# Patient Record
Sex: FEMALE | NOT HISPANIC OR LATINO | Employment: FULL TIME | URBAN - METROPOLITAN AREA
[De-identification: names, ages, dates, MRNs, and addresses within clinical notes are randomized per-mention and may not be internally consistent; named-entity substitution may affect disease eponyms.]

---

## 2017-04-25 ENCOUNTER — OFFICE VISIT (OUTPATIENT)
Dept: DERMATOLOGY | Facility: CLINIC | Age: 32
End: 2017-04-25
Payer: COMMERCIAL

## 2017-04-25 VITALS — DIASTOLIC BLOOD PRESSURE: 83 MMHG | OXYGEN SATURATION: 95 % | SYSTOLIC BLOOD PRESSURE: 115 MMHG | HEART RATE: 80 BPM

## 2017-04-25 DIAGNOSIS — D23.9 DERMATOFIBROMA: ICD-10-CM

## 2017-04-25 DIAGNOSIS — D22.9 NEVUS: Primary | ICD-10-CM

## 2017-04-25 PROCEDURE — 99212 OFFICE O/P EST SF 10 MIN: CPT | Performed by: PHYSICIAN ASSISTANT

## 2017-04-25 NOTE — PROGRESS NOTES
HPI:   Faith Hernández is a 31 year old female who presents for recheck of mole on lower abdomen. She has not noticed it growing or changing.   chief complaint  Location: lower abd   Condition present for:  years.   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    PHYSICAL EXAM:      Skin exam performed as follows: Type 3 skin. Mood appropriate  Alert and Oriented X 3. Well developed, well nourished in no distress.  General appearance: Normal  Head including face: Normal  Eyes: conjunctiva and lids: Normal  Mouth: Lips, teeth, gums: Normal  Neck: Normal  Chest-breast/axillae: Normal  Back: Normal  Spleen and liver: Normal  Cardiovascular: Exam of peripheral vascular system by observation for swelling, varicosities, edema: Normal  Genitalia: groin, buttocks: Normal  Extremities: digits/nails (clubbing): Normal  Eccrine and Apocrine glands: Normal  Right upper extremity: Normal  Left upper extremity: Normal  Right lower extremity: Normal  Left lower extremity: Normal  Skin: Scalp and body hair: See below    1. 2 x 2.5 mm dark brown macule on mid lower abdomen  2. Firm subcutaneous nodule with dimple sign on left dorsal wrist    ASSESSMENT/PLAN:     1. Benign appearing nevus on mid lower abdomen - advised. No change from previous measurement; will continue to monitor yearly. Knows to return immediately if growing or changing in any way.   2. Dermatofibroma on left dorsal wrist - benign no treatment needed.          Follow-up: yearly FSE/PRN sooner  CC:   Scribed By: Kareen Harrison, MS, PA-C

## 2017-04-25 NOTE — NURSING NOTE
"Initial /83  Pulse 80  SpO2 95% Estimated body mass index is 26.54 kg/(m^2) as calculated from the following:    Height as of 10/21/16: 1.607 m (5' 3.25\").    Weight as of 10/21/16: 68.5 kg (151 lb). .      "

## 2017-04-25 NOTE — MR AVS SNAPSHOT
After Visit Summary   4/25/2017    Faith Hernández    MRN: [id number]           Patient Information     Date Of Birth          1985        Visit Information        Provider Department      4/25/2017 9:00 AM Kareen Harrison PA-C [facility]        Today's Diagnoses     Nevus    -  1    Dermatofibroma           Follow-ups after your visit        Who to contact     If you have questions or need follow up information about today's clinic visit or your schedule please contact [facility] directly at [phone].  Normal or non-critical lab and imaging results will be communicated to you by [employer], letter or phone within 4 business days after the clinic has received the results. If you do not hear from us within 7 days, please contact the clinic through [employer] or phone. If you have a critical or abnormal lab result, we will notify you by phone as soon as possible.  Submit refill requests through [employer] or call your pharmacy and they will forward the refill request to us. Please allow 3 business days for your refill to be completed.          Additional Information About Your Visit        MyChart Information     Mem[employer]aze gives you secure access to your electronic health record. If you see a primary care provider, you can also send messages to your care team and make appointments. If you have questions, please call your primary care clinic.  If you do not have a primary care provider, please call [phone] and they will assist you.        Care EveryWhere ID     This is your Care EveryWhere ID. This could be used by other organizations to access your [place] medical records  [id number]        Your Vitals Were     Pulse Pulse Oximetry                80 95%           Blood Pressure from Last 3 Encounters:   04/25/17 115/83   10/25/16 116/80   10/21/16 104/66    Weight from Last 3 Encounters:   10/21/16 68.5 kg (151 lb)   07/07/15 66.7 kg (147 lb)    08/29/14 63.5 kg (140 lb)              Today, you had the following     No orders found for display       Primary Care Provider Office Phone # Fax #    Michael Arenas [phone]       [facility] [address] [facility] [address]        Thank you!     Thank you for choosing [facility]  for your care. Our goal is always to provide you with excellent care. Hearing back from our patients is one way we can continue to improve our services. Please take a few minutes to complete the written survey that you may receive in the mail after your visit with us. Thank you!             Your Updated Medication List - Protect others around you: Learn how to safely use, store and throw away your medicines at www.disposemymeds.org.          This list is accurate as of: 4/25/17  1:52 PM.  Always use your most recent med list.                   Brand Name Dispense Instructions for use    levonorgestrel-ethinyl estradiol 0.1-20 MG-MCG per tablet    AVIANE,ALESSE,LESSINA    84 tablet    Take 1 tablet by mouth daily       levothyroxine 112 MCG tablet    SYNTHROID/LEVOTHROID    90 tablet    Take 1 tablet (112 mcg) by mouth daily

## 2017-10-24 DIAGNOSIS — Z30.9 ENCOUNTER FOR CONTRACEPTIVE MANAGEMENT: ICD-10-CM

## 2017-10-25 RX ORDER — LEVONORGESTREL/ETHIN.ESTRADIOL 0.1-0.02MG
TABLET ORAL
Qty: 28 TABLET | Refills: 0 | Status: SHIPPED | OUTPATIENT
Start: 2017-10-25 | End: 2017-12-02

## 2017-11-22 DIAGNOSIS — E03.9 HYPOTHYROIDISM, UNSPECIFIED TYPE: ICD-10-CM

## 2017-11-22 RX ORDER — LEVOTHYROXINE SODIUM 112 UG/1
TABLET ORAL
Qty: 30 TABLET | Refills: 0 | Status: SHIPPED | OUTPATIENT
Start: 2017-11-22 | End: 2017-12-05

## 2017-11-22 NOTE — TELEPHONE ENCOUNTER
Levothyroxine dose was increased one year ago to 112  g daily. Patient never had follow-up lab done as instructed. Has appointment to see me in early December. Wll refill medication for 30 days and have lab drawn prior to that if patient reads my chart message or when she sees me in clinic.

## 2017-12-02 ENCOUNTER — MYC REFILL (OUTPATIENT)
Dept: INTERNAL MEDICINE | Facility: CLINIC | Age: 32
End: 2017-12-02

## 2017-12-02 DIAGNOSIS — Z30.9 ENCOUNTER FOR CONTRACEPTIVE MANAGEMENT: ICD-10-CM

## 2017-12-02 DIAGNOSIS — E03.9 HYPOTHYROIDISM, UNSPECIFIED TYPE: ICD-10-CM

## 2017-12-02 LAB — TSH SERPL DL<=0.005 MIU/L-ACNC: 2.26 MU/L (ref 0.4–4)

## 2017-12-02 PROCEDURE — 84443 ASSAY THYROID STIM HORMONE: CPT | Performed by: INTERNAL MEDICINE

## 2017-12-02 PROCEDURE — 36415 COLL VENOUS BLD VENIPUNCTURE: CPT | Performed by: INTERNAL MEDICINE

## 2017-12-04 RX ORDER — LEVONORGESTREL/ETHIN.ESTRADIOL 0.1-0.02MG
1 TABLET ORAL DAILY
Qty: 28 TABLET | Refills: 0 | Status: SHIPPED | OUTPATIENT
Start: 2017-12-04 | End: 2017-12-05

## 2017-12-04 NOTE — TELEPHONE ENCOUNTER
Message from [employer]:  Original authorizing provider: MD Faith Melendez would like a refill of the following medications:  levonorgestrel-ethinyl estradiol (VIRIDIANA RICHEY LESSINA) 0.1-20 MG-MCG per tablet [Michael Arenas MD]    Preferred pharmacy: [id number] - [place], [place] - [address]    Comment:  I need a refill for tomorrow as I have run out. I have an appointment with Dr. Arenas on Tuesday.

## 2017-12-05 ENCOUNTER — OFFICE VISIT (OUTPATIENT)
Dept: INTERNAL MEDICINE | Facility: CLINIC | Age: 32
End: 2017-12-05
Payer: COMMERCIAL

## 2017-12-05 VITALS
DIASTOLIC BLOOD PRESSURE: 76 MMHG | WEIGHT: 158 LBS | OXYGEN SATURATION: 99 % | SYSTOLIC BLOOD PRESSURE: 112 MMHG | BODY MASS INDEX: 27.77 KG/M2 | HEART RATE: 98 BPM | TEMPERATURE: 97.9 F

## 2017-12-05 DIAGNOSIS — Z00.00 ENCOUNTER FOR ROUTINE ADULT HEALTH EXAMINATION WITHOUT ABNORMAL FINDINGS: ICD-10-CM

## 2017-12-05 DIAGNOSIS — Z30.9 ENCOUNTER FOR CONTRACEPTIVE MANAGEMENT, UNSPECIFIED TYPE: Primary | ICD-10-CM

## 2017-12-05 DIAGNOSIS — E03.9 HYPOTHYROIDISM, UNSPECIFIED TYPE: ICD-10-CM

## 2017-12-05 PROCEDURE — 99395 PREV VISIT EST AGE 18-39: CPT | Performed by: INTERNAL MEDICINE

## 2017-12-05 RX ORDER — LEVONORGESTREL/ETHIN.ESTRADIOL 0.1-0.02MG
1 TABLET ORAL DAILY
Qty: 84 TABLET | Refills: 3 | Status: SHIPPED | OUTPATIENT
Start: 2017-12-05 | End: 2018-11-27

## 2017-12-05 RX ORDER — LEVOTHYROXINE SODIUM 112 UG/1
112 TABLET ORAL DAILY
Qty: 90 TABLET | Refills: 3 | Status: SHIPPED | OUTPATIENT
Start: 2017-12-05 | End: 2018-12-17

## 2017-12-05 NOTE — PROGRESS NOTES
SUBJECTIVE:   CC: Faith Hernández is an 32 year old woman who presents for preventive health visit.     Healthy Habits:  Answers for HPI/ROS submitted by the patient on 12/5/2017   Annual Exam:  Getting at least 3 servings of Calcium per day:: Yes  Bi-annual eye exam:: Yes  Dental care twice a year:: Yes  Sleep apnea or symptoms of sleep apnea:: None  Diet:: Regular (no restrictions)  Frequency of exercise:: 1 day/week  Taking medications regularly:: Yes  Medication side effects:: Not applicable  Additional concerns today:: No  PHQ-2 Score: 0  Duration of exercise:: 15-30 minutes          Today's PHQ-2 Score: PHQ-2 ( 1999 Pfizer) 10/21/2016 7/7/2015   Q1: Little interest or pleasure in doing things 0 0   Q2: Feeling down, depressed or hopeless 0 0   PHQ-2 Score 0 0   Q1: Little interest or pleasure in doing things Not at all -   Q2: Feeling down, depressed or hopeless Not at all -   PHQ-2 Score 0 -         Abuse: Current or Past(Physical, Sexual or Emotional)- No  Do you feel safe in your environment - Yes  Social History   Substance Use Topics     Smoking status: Never Smoker     Smokeless tobacco: Not on file     Alcohol use Yes      Comment: 6-8 weekly     The patient does not drink >3 drinks per day nor >7 drinks per week.    Reviewed orders with patient.  Reviewed health maintenance and updated orders accordingly - Yes  Labs reviewed in EPIC    Mammogram not appropriate for this patient based on age.    Pertinent mammograms are reviewed under the imaging tab.  History of abnormal Pap smear: NO - age 30-65 PAP every 5 years with negative HPV co-testing recommended    Reviewed and updated as needed this visit by clinical staff         Reviewed and updated as needed this visit by Provider            ROS:  C: NEGATIVE for fever, chills. Weight up 6 pounds  I: NEGATIVE for worrisome rashes, moles or lesions.   E: NEGATIVE for vision changes or irritation. Eye exam 2 years ago  ENT: NEGATIVE for ear, mouth and  throat problems  R: NEGATIVE for significant cough or SOB  B: NEGATIVE for masses, tenderness or discharge  CV: NEGATIVE for chest pain, palpitations or peripheral edema  GI: NEGATIVE for nausea, abdominal pain, heartburn, or change in bowel habits  : NEGATIVE for unusual urinary or vaginal symptoms. Periods are regular. LMP 1 week ago. Pap UTD  M: NEGATIVE for significant arthralgias or myalgia  N: NEGATIVE for weakness, dizziness or paresthesias  E: On thyroid replacement. Hx Vit D deficiency but not taking supplement recently  P: NEGATIVE for changes in mood or affect    OBJECTIVE:   /76  Pulse 98  Temp 97.9  F (36.6  C) (Oral)  Wt 158 lb (71.7 kg)  LMP 11/29/2017 (Exact Date)  SpO2 99%  BMI 27.77 kg/m2  EXAM:  General appearance - healthy, alert, no distress  Skin - No rashes or lesions.  Head - normocephalic, atraumatic  Eyes - PEACE, EOMI, fundi exam with nondilated pupils negative.  Ears - External ears normal. Canals clear. TM's normal.  Nose/Sinuses - Nares normal. Septum midline. Mucosa normal. No drainage or sinus tenderness.  Oropharynx - No erythema, no adenopathy, no exudates.  Neck - Supple without adenopathy or thyromegaly. No bruits.  Lungs - Clear to auscultation without wheezes/rhonchi.  Heart - Regular rate and rhythm without murmurs, clicks, or gallops.  Nodes - No supraclavicular, axillary, or inguinal adenopathy palpable.  Breasts - deferred  Abdomen - Abdomen soft, non-tender. BS normal. No masses or hepatosplenomegaly palpable. No bruits.  Extremities -No cyanosis, clubbing or edema.    Musculoskeletal - Spine ROM normal. Muscular strength intact.   Peripheral pulses - radial=4/4, femoral=4/4, posterior tibial=4/4, dorsalis pedis=4/4,  Neuro - Gait normal. Reflexes normal and symmetric. Sensation grossly WNL.  Genital/Rectal - deferred      ASSESSMENT/PLAN:   1. Encounter for routine adult health examination without abnormal findings  Health maintenance up-to-date.  Patient  "declines flu shot.  Screening labs in 1 year as ordered. See below re: diet counselling  - Vitamin D Deficiency; Future  - CBC with platelets; Future  - Comprehensive metabolic panel; Future  - Vitamin B12; Future  - Lipid panel reflex to direct LDL Fasting; Future    2. Encounter for contraceptive management, unspecified type  Patient wishes to continue birth control.  Medication refilled.  - levonorgestrel-ethinyl estradiol (AVIANE,ALESSE,LESSINA) 0.1-20 MG-MCG per tablet; Take 1 tablet by mouth daily  Dispense: 84 tablet; Refill: 3    3. Hypothyroidism, unspecified type  Controlled.  Continue current medication  - levothyroxine (SYNTHROID/LEVOTHROID) 112 MCG tablet; Take 1 tablet (112 mcg) by mouth daily  Dispense: 90 tablet; Refill: 3  - TSH with free T4 reflex; Future      COUNSELING:   Reviewed preventive health counseling, as reflected in patient instructions  Special attention given to:        Regular exercise       Healthy diet/nutrition         reports that she has never smoked. She does not have any smokeless tobacco history on file.    Estimated body mass index is 26.54 kg/(m^2) as calculated from the following:    Height as of 10/21/16: 5' 3.25\" (1.607 m).    Weight as of 10/21/16: 151 lb (68.5 kg).   Weight management plan: Discussed healthy diet and exercise guidelines and patient will follow up in 12 months in clinic to re-evaluate.    Counseling Resources:  ATP IV Guidelines  Pooled Cohorts Equation Calculator  Breast Cancer Risk Calculator  FRAX Risk Assessment  ICSI Preventive Guidelines  Dietary Guidelines for Americans, 2010  USDA's MyPlate  ASA Prophylaxis  Lung CA Screening      PLAN:  Continue current meds  Prescriptions refilled.    Call  [phone] or use [employer] to schedule a future lab appointment  fasting in 1 year.   Follow up with me a few days after these future labs are drawn to review results and other medical issues as necessary  Calorie/carbohydrate (sugar, bread, potato, pasta, " etc) reduction in diet for weight loss.  Increase color on your plate with fruits and vegetables. Increase  frequency of walking or other aerobic exercise as able (goal is daily)  Patient declines flu vaccination  Eye exam in the next 1 year       Michael Arenas MD  [facility]

## 2017-12-05 NOTE — NURSING NOTE
"Chief Complaint   Patient presents with     Physical       Initial /76  Pulse 98  Temp 97.9  F (36.6  C) (Oral)  Wt 158 lb (71.7 kg)  LMP 11/29/2017 (Exact Date)  SpO2 99%  BMI 27.77 kg/m2 Estimated body mass index is 27.77 kg/(m^2) as calculated from the following:    Height as of 10/21/16: 5' 3.25\" (1.607 m).    Weight as of this encounter: 158 lb (71.7 kg).  Medication Reconciliation: complete  "

## 2017-12-05 NOTE — PATIENT INSTRUCTIONS
Continue current meds  Prescriptions refilled.    Call  [phone] or use [employer] to schedule a future lab appointment  fasting in 1 year.   Follow up with me a few days after these future labs are drawn to review results and other medical issues as necessary  Calorie/carbohydrate (sugar, bread, potato, pasta, etc) reduction in diet for weight loss.  Increase color on your plate with fruits and vegetables. Increase  frequency of walking or other aerobic exercise as able (goal is daily)  Patient declines flu vaccination  Eye exam in the next 1 year

## 2017-12-05 NOTE — MR AVS SNAPSHOT
After Visit Summary   12/5/2017    Faith Hernández    MRN: [id number]           Patient Information     Date Of Birth          1985        Visit Information        Provider Department      12/5/2017 3:00 PM Michael Arenas MD [facility]        Today's Diagnoses     Encounter for contraceptive management, unspecified type    -  1    Encounter for routine adult health examination without abnormal findings        Hypothyroidism, unspecified type          Care Instructions    Continue current meds  Prescriptions refilled.    Call  [phone] or use [employer] to schedule a future lab appointment  fasting in 1 year.   Follow up with me a few days after these future labs are drawn to review results and other medical issues as necessary  Calorie/carbohydrate (sugar, bread, potato, pasta, etc) reduction in diet for weight loss.  Increase color on your plate with fruits and vegetables. Increase  frequency of walking or other aerobic exercise as able (goal is daily)  Patient declines flu vaccination  Eye exam in the next 1 year            Follow-ups after your visit        Future tests that were ordered for you today     Open Future Orders        Priority Expected Expires Ordered    Lipid panel reflex to direct LDL Fasting Routine 11/5/2018 12/5/2018 12/5/2017    Vitamin D Deficiency Routine 11/5/2018 12/5/2018 12/5/2017    CBC with platelets Routine 11/5/2018 12/5/2018 12/5/2017    Comprehensive metabolic panel Routine 11/5/2018 12/5/2018 12/5/2017    Vitamin B12 Routine 11/5/2018 12/5/2018 12/5/2017    TSH with free T4 reflex Routine 11/5/2018 12/5/2018 12/5/2017            Who to contact     If you have questions or need follow up information about today's clinic visit or your schedule please contact [facility] directly at [phone].  Normal or non-critical lab and imaging results will be communicated to you by MyChart, letter or phone within 4 business  days after the clinic has received the results. If you do not hear from us within 7 days, please contact the clinic through Hooked or phone. If you have a critical or abnormal lab result, we will notify you by phone as soon as possible.  Submit refill requests through Hooked or call your pharmacy and they will forward the refill request to us. Please allow 3 business days for your refill to be completed.          Additional Information About Your Visit        Hooked Information     Hooked gives you secure access to your electronic health record. If you see a primary care provider, you can also send messages to your care team and make appointments. If you have questions, please call your primary care clinic.  If you do not have a primary care provider, please call [phone] and they will assist you.        Care EveryWhere ID     This is your Care EveryWhere ID. This could be used by other organizations to access your [place] medical records  [id number]        Your Vitals Were     Pulse Temperature Last Period Pulse Oximetry BMI (Body Mass Index)       98 97.9  F (36.6  C) (Oral) 11/29/2017 (Exact Date) 99% 27.77 kg/m2        Blood Pressure from Last 3 Encounters:   12/05/17 112/76   04/25/17 115/83   10/25/16 116/80    Weight from Last 3 Encounters:   12/05/17 158 lb (71.7 kg)   10/21/16 151 lb (68.5 kg)   07/07/15 147 lb (66.7 kg)                 Today's Medication Changes          These changes are accurate as of: 12/5/17  3:32 PM.  If you have any questions, ask your nurse or doctor.               These medicines have changed or have updated prescriptions.        Dose/Directions    levothyroxine 112 MCG tablet   Commonly known as:  SYNTHROID/LEVOTHROID   This may have changed:  See the new instructions.   Used for:  Hypothyroidism, unspecified type   Changed by:  Michale Arenas MD        Dose:  112 mcg   Take 1 tablet (112 mcg) by mouth daily   Quantity:  90 tablet   Refills:  3            Where to get  your medicines      These medications were sent to [id number] - [place], [place] - [address]  [address], [place] [address]     Phone:  [phone]     levonorgestrel-ethinyl estradiol 0.1-20 MG-MCG per tablet    levothyroxine 112 MCG tablet                Primary Care Provider Office Phone # Fax #    Michael Arenas [phone]       [address] [facility] [address]        Equal Access to Services     MAIRA PRESTON : Hadii aad ku hadasho Soomaali, waaxda luqadaha, qaybta kaalmada adeegyada, waxay idiin hayaan adeeg thalia camargo . So [facility] [phone].    ATENCIÓN: Si habla español, tiene a xiao disposición servicios gratuitos de asistencia lingüística. [facility] [phone].    We comply with applicable federal civil rights laws and [place] laws. We do not discriminate on the basis of race, color, national origin, age, disability, sex, sexual orientation, or gender identity.            Thank you!     Thank you for choosing [facility]  for your care. Our goal is always to provide you with excellent care. Hearing back from our patients is one way we can continue to improve our services. Please take a few minutes to complete the written survey that you may receive in the mail after your visit with us. Thank you!             Your Updated Medication List - Protect others around you: Learn how to safely use, store and throw away your medicines at www.disposemymeds.org.          This list is accurate as of: 12/5/17  3:32 PM.  Always use your most recent med list.                   Brand Name Dispense Instructions for use Diagnosis    levonorgestrel-ethinyl estradiol 0.1-20 MG-MCG per tablet    AVIANE,ALESSE,LESSINA    84 tablet    Take 1 tablet by mouth daily    Encounter for contraceptive management, unspecified type       levothyroxine 112 MCG tablet    SYNTHROID/LEVOTHROID    90 tablet    Take 1 tablet (112 mcg) by mouth daily    Hypothyroidism,  unspecified type

## 2017-12-22 ENCOUNTER — MYC MEDICAL ADVICE (OUTPATIENT)
Dept: INTERNAL MEDICINE | Facility: CLINIC | Age: 32
End: 2017-12-22

## 2017-12-22 ENCOUNTER — MYC REFILL (OUTPATIENT)
Dept: INTERNAL MEDICINE | Facility: CLINIC | Age: 32
End: 2017-12-22

## 2017-12-22 DIAGNOSIS — Z30.9 ENCOUNTER FOR CONTRACEPTIVE MANAGEMENT, UNSPECIFIED TYPE: ICD-10-CM

## 2017-12-22 DIAGNOSIS — E03.9 HYPOTHYROIDISM, UNSPECIFIED TYPE: ICD-10-CM

## 2017-12-22 RX ORDER — LEVONORGESTREL/ETHIN.ESTRADIOL 0.1-0.02MG
1 TABLET ORAL DAILY
Qty: 84 TABLET | Refills: 3 | Status: CANCELLED | OUTPATIENT
Start: 2017-12-22

## 2017-12-22 RX ORDER — LEVOTHYROXINE SODIUM 112 UG/1
112 TABLET ORAL DAILY
Qty: 90 TABLET | Refills: 3 | Status: CANCELLED | OUTPATIENT
Start: 2017-12-22

## 2017-12-22 NOTE — TELEPHONE ENCOUNTER
Message from [employer]:  Original authorizing provider: MD Faith Melendez KARENNic Hernández would like a refill of the following medications:  levonorgestrel-ethinyl estradiol (AVIANE,ALENORAE,LESSINA) 0.1-20 MG-MCG per tablet [Michael Arenas MD]  levothyroxine (SYNTHROID/LEVOTHROID) 112 MCG tablet [Michael Arenas MD]    Preferred pharmacy: [id number] - [place], [place] - [address]    Comment:  Both prescriptions were approved for refill after my appointment with Dr. Arenas in December. I tried to refill my birth control however was told it is too early to refill. I need this prescription by Sunday 12/24. Also, please refill the thyroid if possible as I am running low. Thanks,

## 2018-08-12 ENCOUNTER — OFFICE VISIT (OUTPATIENT)
Dept: URGENT CARE | Facility: URGENT CARE | Age: 33
End: 2018-08-12
Payer: COMMERCIAL

## 2018-08-12 VITALS
BODY MASS INDEX: 26.54 KG/M2 | RESPIRATION RATE: 20 BRPM | HEART RATE: 84 BPM | TEMPERATURE: 97.9 F | WEIGHT: 151 LBS | DIASTOLIC BLOOD PRESSURE: 86 MMHG | SYSTOLIC BLOOD PRESSURE: 136 MMHG

## 2018-08-12 DIAGNOSIS — R21 RASH AND NONSPECIFIC SKIN ERUPTION: Primary | ICD-10-CM

## 2018-08-12 PROCEDURE — 99213 OFFICE O/P EST LOW 20 MIN: CPT | Performed by: PHYSICIAN ASSISTANT

## 2018-08-12 RX ORDER — METHYLPREDNISOLONE 4 MG
TABLET, DOSE PACK ORAL
Qty: 21 TABLET | Refills: 0 | Status: SHIPPED | OUTPATIENT
Start: 2018-08-12 | End: 2018-12-12

## 2018-08-12 RX ORDER — TRIAMCINOLONE ACETONIDE 1 MG/G
CREAM TOPICAL
Qty: 80 G | Refills: 0 | Status: SHIPPED | OUTPATIENT
Start: 2018-08-12 | End: 2018-12-12

## 2018-08-12 RX ORDER — CETIRIZINE HYDROCHLORIDE 10 MG/1
10 TABLET ORAL EVERY EVENING
Qty: 30 TABLET | Refills: 0 | Status: SHIPPED | OUTPATIENT
Start: 2018-08-12 | End: 2018-12-12

## 2018-08-12 NOTE — PROGRESS NOTES
SUBJECTIVE:  Faith Hernández is a 32 year old female who presents to the clinic today for a rash on her right upper and lower leg and her left lower leg, onset this morning.   States that she was sitting outside at a campfire last night.  Also slept on a futon on the floor at a friend's house.    Denies any fevers, joint pain or blistery rash or open wounds.    She is otherwise in her usual state of health.    She has not taken any thing for the symptoms yet.    Somewhat itchy.    Recent exposure history: as above  Recent new medications: None  Associated symptoms include: nothing.    Past Medical History:   Diagnosis Date     Hypothyroidism 1/4/2012      No Known Allergies  Social History   Substance Use Topics     Smoking status: Never Smoker     Smokeless tobacco: Never Used     Alcohol use Yes      Comment: 6-8 weekly       ROS:  CONSTITUTIONAL:NEGATIVE for fever, chills, change in weight  INTEGUMENTARY/SKIN: as per HPI  ENT/MOUTH: NEGATIVE for ear, mouth and throat problems  RESP:NEGATIVE for significant cough or SOB  CV: NEGATIVE for chest pain, palpitations or peripheral edema  GI: NEGATIVE for nausea, abdominal pain, heartburn, or change in bowel habits  MUSCULOSKELETAL: NEGATIVE for significant arthralgias or myalgia  NEURO: NEGATIVE for weakness, dizziness or paresthesias  Review of systems negative except as stated above.    EXAM:   /86 (Cuff Size: Adult Regular)  Pulse 84  Temp 97.9  F (36.6  C) (Oral)  Resp 20  Wt 151 lb (68.5 kg)  BMI 26.54 kg/m2  GENERAL: alert, no acute distress.  SKIN: erythematous raised lesions about 1cm in diameter, a few somewhat larger, on right upper and lower leg.  A few on left lower leg.    No vesicles.  No scabs or open wounds.    OP: clear  JOINTS: non tender    (R21) Rash and nonspecific skin eruption  (primary encounter diagnosis)  Comment: consistent with likely insect bites   Plan: methylPREDNISolone (MEDROL DOSEPAK) 4 MG         tablet, triamcinolone  (KENALOG) 0.1 % cream,         cetirizine (ZYRTEC) 10 MG tablet            Follow up with primary clinic should symptoms persist or worsen.     Patient expresses understanding and agreement with the assessment and plan as above.

## 2018-08-12 NOTE — PATIENT INSTRUCTIONS
(R21) Rash and nonspecific skin eruption  (primary encounter diagnosis)  Comment: consistent with likely insect bites   Plan: methylPREDNISolone (MEDROL DOSEPAK) 4 MG         tablet, triamcinolone (KENALOG) 0.1 % cream,         cetirizine (ZYRTEC) 10 MG tablet            Follow up with primary clinic should symptoms persist or worsen.

## 2018-08-12 NOTE — MR AVS SNAPSHOT
After Visit Summary   8/12/2018    Faith Hernández    MRN: [id number]           Patient Information     Date Of Birth          1985        Visit Information        Provider Department      8/12/2018 4:25 PM Erna Mckenzie PA-C [facility]        Today's Diagnoses     Rash and nonspecific skin eruption    -  1      Care Instructions    (R21) Rash and nonspecific skin eruption  (primary encounter diagnosis)  Comment: consistent with likely insect bites   Plan: methylPREDNISolone (MEDROL DOSEPAK) 4 MG         tablet, triamcinolone (KENALOG) 0.1 % cream,         cetirizine (ZYRTEC) 10 MG tablet            Follow up with primary clinic should symptoms persist or worsen.              Follow-ups after your visit        Who to contact     If you have questions or need follow up information about today's clinic visit or your schedule please contact [facility] directly at [phone].  Normal or non-critical lab and imaging results will be communicated to you by [email or web address], letter or phone within 4 business days after the clinic has received the results. If you do not hear from us within 7 days, please contact the clinic through [email or web address] or phone. If you have a critical or abnormal lab result, we will notify you by phone as soon as possible.  Submit refill requests through [employer] or call your pharmacy and they will forward the refill request to us. Please allow 3 business days for your refill to be completed.          Additional Information About Your Visit        MyChart Information     [employer] gives you secure access to your electronic health record. If you see a primary care provider, you can also send messages to your care team and make appointments. If you have questions, please call your primary care clinic.  If you do not have a primary care provider, please call [phone] and they will assist you.        Care EveryWhere ID      This is your Care EveryWhere ID. This could be used by other organizations to access your [facility] medical records  [id number]        Your Vitals Were     Pulse Temperature Respirations BMI (Body Mass Index)          84 97.9  F (36.6  C) (Oral) 20 26.54 kg/m2         Blood Pressure from Last 3 Encounters:   08/12/18 136/86   12/05/17 112/76   04/25/17 115/83    Weight from Last 3 Encounters:   08/12/18 151 lb (68.5 kg)   12/05/17 158 lb (71.7 kg)   10/21/16 151 lb (68.5 kg)              Today, you had the following     No orders found for display         Today's Medication Changes          These changes are accurate as of 8/12/18  5:39 PM.  If you have any questions, ask your nurse or doctor.               Start taking these medicines.        Dose/Directions    cetirizine 10 MG tablet   Commonly known as:  zyrTEC   Used for:  Rash and nonspecific skin eruption   Started by:  Erna Mckenzie PA-C        Dose:  10 mg   Take 1 tablet (10 mg) by mouth every evening   Quantity:  30 tablet   Refills:  0       methylPREDNISolone 4 MG tablet   Commonly known as:  MEDROL DOSEPAK   Used for:  Rash and nonspecific skin eruption   Started by:  Erna Mckenzie PA-C        Follow package instructions   Quantity:  21 tablet   Refills:  0       triamcinolone 0.1 % cream   Commonly known as:  KENALOG   Used for:  Rash and nonspecific skin eruption   Started by:  Erna Mckenzie PA-C        Apply sparingly to affected area three times daily as needed   Quantity:  80 g   Refills:  0            Where to get your medicines      These medications were sent to [id number] - [facility] [address] AT [facility] [address], [facility] [address]     Phone:  [phone]     cetirizine 10 MG tablet    methylPREDNISolone 4 MG tablet    triamcinolone 0.1 % cream                Primary Care Provider Office Phone # Fax #    Michael Arenas [phone]  [phone]       [address] [facility] [address]        Equal Access to Services     REBEKAHKONSTANTIN KARY : Hadii truman wood keisha Soalli, waamandada luqadaha, qaybta kaerida stephania, arsh justinoin hayaaterry nixonlaverne vásquez mary jo tiwari. So [facility] [phone].    ATENCIÓN: Si habla español, tiene a xiao disposición servicios gratuitos de asistencia lingüística. Llame al [phone].    We comply with applicable federal civil rights laws and [place] laws. We do not discriminate on the basis of race, color, national origin, age, disability, sex, sexual orientation, or gender identity.            Thank you!     Thank you for choosing [facility]  for your care. Our goal is always to provide you with excellent care. Hearing back from our patients is one way we can continue to improve our services. Please take a few minutes to complete the written survey that you may receive in the mail after your visit with us. Thank you!             Your Updated Medication List - Protect others around you: Learn how to safely use, store and throw away your medicines at www.disposemymeds.org.          This list is accurate as of 8/12/18  5:39 PM.  Always use your most recent med list.                   Brand Name Dispense Instructions for use Diagnosis    cetirizine 10 MG tablet    zyrTEC    30 tablet    Take 1 tablet (10 mg) by mouth every evening    Rash and nonspecific skin eruption       levonorgestrel-ethinyl estradiol 0.1-20 MG-MCG per tablet    AVIANE,ALESSE,LESSINA    84 tablet    Take 1 tablet by mouth daily    Encounter for contraceptive management, unspecified type       levothyroxine 112 MCG tablet    SYNTHROID/LEVOTHROID    90 tablet    Take 1 tablet (112 mcg) by mouth daily    Hypothyroidism, unspecified type       methylPREDNISolone 4 MG tablet    MEDROL DOSEPAK    21 tablet    Follow package instructions    Rash and nonspecific skin eruption       triamcinolone 0.1 % cream    KENALOG    80 g    Apply  sparingly to affected area three times daily as needed    Rash and nonspecific skin eruption

## 2018-11-26 ENCOUNTER — MYC REFILL (OUTPATIENT)
Dept: INTERNAL MEDICINE | Facility: CLINIC | Age: 33
End: 2018-11-26

## 2018-11-26 DIAGNOSIS — Z30.9 ENCOUNTER FOR CONTRACEPTIVE MANAGEMENT, UNSPECIFIED TYPE: ICD-10-CM

## 2018-11-26 RX ORDER — LEVONORGESTREL/ETHIN.ESTRADIOL 0.1-0.02MG
1 TABLET ORAL DAILY
Qty: 84 TABLET | Refills: 3 | Status: CANCELLED | OUTPATIENT
Start: 2018-11-26

## 2018-11-26 NOTE — TELEPHONE ENCOUNTER
Message from [employer]:  Original authorizing provider: MD Faith Melendez would like a refill of the following medications:  levonorgestrel-ethinyl estradiol (VIRIDIANA RICHEY LESSINA) 0.1-20 MG-MCG per tablet [Michael Arenas MD]    Preferred pharmacy: [facility] [id number] [facility] - [place], [place] - [address]    Comment:  Please renew this birth control prescription ASAP. I will need this by Sunday December 2nd. I have an appointment with my doctor on 12/12 and will discuss future prescriptions with him during that time. Thanks.

## 2018-11-27 DIAGNOSIS — Z30.9 ENCOUNTER FOR CONTRACEPTIVE MANAGEMENT, UNSPECIFIED TYPE: ICD-10-CM

## 2018-11-27 RX ORDER — LEVONORGESTREL AND ETHINYL ESTRADIOL 0.1-0.02MG
KIT ORAL
Qty: 84 TABLET | Refills: 0 | Status: SHIPPED | OUTPATIENT
Start: 2018-11-27 | End: 2019-02-10

## 2018-11-27 NOTE — TELEPHONE ENCOUNTER
"Requested Prescriptions   Pending Prescriptions Disp Refills     levonorgestrel-ethinyl estradiol (AVIANE,ZHOUE,LESSINA) 0.1-20 MG-MCG per tablet  Last Written Prescription Date:  12/05/2017  Last Fill Quantity: 84,  # refills: 3   Last office visit: 12/5/2017 with prescribing provider:  ESTELITA    Future Office Visit:   Next 5 appointments (look out 90 days)     Dec 12, 2018 11:30 AM CST   MyChart Physical Adult with Michael Arenas MD   [facility]    [address] [address] [phone]                  84 tablet 3     Sig: Take 1 tablet by mouth daily    Contraceptives Protocol Passed    11/26/2018  2:18 PM       Passed - Patient is not a current smoker if age is 35 or older       Passed - Recent (12 mo) or future (30 days) visit within the authorizing provider's specialty    Patient had office visit in the last 12 months or has a visit in the next 30 days with authorizing provider or within the authorizing provider's specialty.  See \"Patient Info\" tab in inbasket, or \"Choose Columns\" in Meds & Orders section of the refill encounter.             Passed - No active pregnancy on record       Passed - No positive pregnancy test in past 12 months          "

## 2018-11-27 NOTE — TELEPHONE ENCOUNTER
"Requested Prescriptions   Pending Prescriptions Disp Refills     LARISSIA 0.1-20 MG-MCG per tablet [Pharmacy Med Name: LARISSIA-28 TABLET]  Last Written Prescription Date:  12/05/2017  Last Fill Quantity: 84,  # refills: 03   Last Office Visit: 12/5/2017   Future Office Visit:    Next 5 appointments (look out 90 days)     Dec 12, 2018 11:30 AM CST   [facility]   [facility]    [address] [id number] [phone]                  84 tablet 3     Sig: TAKE 1 TABLET BY MOUTH DAILY    Contraceptives Protocol Passed    11/27/2018  1:55 AM       Passed - Patient is not a current smoker if age is 35 or older       Passed - Recent (12 mo) or future (30 days) visit within the authorizing provider's specialty    Patient had office visit in the last 12 months or has a visit in the next 30 days with authorizing provider or within the authorizing provider's specialty.  See \"Patient Info\" tab in inbasket, or \"Choose Columns\" in Meds & Orders section of the refill encounter.             Passed - No active pregnancy on record       Passed - No positive pregnancy test in past 12 months          "

## 2018-11-27 NOTE — TELEPHONE ENCOUNTER
"Requested Prescriptions   Pending Prescriptions Disp Refills     LARISSIA 0.1-20 MG-MCG per tablet [Pharmacy Med Name: LARISSIA-28 TABLET] 84 tablet 3     Sig: TAKE 1 TABLET BY MOUTH DAILY    Contraceptives Protocol Passed    11/27/2018 11:14 AM       Passed - Patient is not a current smoker if age is 35 or older       Passed - Recent (12 mo) or future (30 days) visit within the authorizing provider's specialty    Patient had office visit in the last 12 months or has a visit in the next 30 days with authorizing provider or within the authorizing provider's specialty.  See \"Patient Info\" tab in inbasket, or \"Choose Columns\" in Meds & Orders section of the refill encounter.             Passed - No active pregnancy on record       Passed - No positive pregnancy test in past 12 months        Last Written Prescription Date:  12/5/17  Last Fill Quantity: 84,  # refills: 3   Last office visit: 12/5/2017 with prescribing provider:  12/5/17   Future Office Visit:   Next 5 appointments (look out 90 days)     Dec 12, 2018 11:30 AM CST   [facility] Physical Adult with Michael Arenas MD   [facility]    [address] [address] [phone]                   "

## 2018-12-11 NOTE — PATIENT INSTRUCTIONS
Eye exam in the next year   Pt declines flu vaccine  For foot, get OTC Clotrimazole/Lotrimin antifungal  cream and apply to foot skin rash issues twice a day for 1 week. Then if persists, then use triamcinolone steroid cream twice a day until resolves   May use steroid cream for palm hand at night and use Vanicream or Lubriderm in daytime for dry skin   Fasting labs in the next couple weeks  Calorie/carbohydrate (sugar, bread, potato, pasta, etc) reduction in diet.   Increase color on your plate with fruits and vegetables. Increase  frequency of walking or other aerobic exercise as able (goal is daily)

## 2018-12-12 ENCOUNTER — OFFICE VISIT (OUTPATIENT)
Dept: INTERNAL MEDICINE | Facility: CLINIC | Age: 33
End: 2018-12-12
Payer: COMMERCIAL

## 2018-12-12 VITALS
TEMPERATURE: 98.5 F | RESPIRATION RATE: 16 BRPM | BODY MASS INDEX: 28.52 KG/M2 | HEART RATE: 94 BPM | WEIGHT: 155 LBS | HEIGHT: 62 IN | DIASTOLIC BLOOD PRESSURE: 74 MMHG | OXYGEN SATURATION: 100 % | SYSTOLIC BLOOD PRESSURE: 124 MMHG

## 2018-12-12 DIAGNOSIS — B35.3 TINEA PEDIS OF BOTH FEET: ICD-10-CM

## 2018-12-12 DIAGNOSIS — Z11.4 SCREENING FOR HIV (HUMAN IMMUNODEFICIENCY VIRUS): ICD-10-CM

## 2018-12-12 DIAGNOSIS — Z00.01 ENCOUNTER FOR ROUTINE ADULT MEDICAL EXAM WITH ABNORMAL FINDINGS: Primary | ICD-10-CM

## 2018-12-12 DIAGNOSIS — L30.9 DERMATITIS: ICD-10-CM

## 2018-12-12 PROCEDURE — 99395 PREV VISIT EST AGE 18-39: CPT | Performed by: INTERNAL MEDICINE

## 2018-12-12 RX ORDER — TRIAMCINOLONE ACETONIDE 1 MG/G
CREAM TOPICAL 2 TIMES DAILY
Qty: 45 G | Refills: 0 | Status: SHIPPED | OUTPATIENT
Start: 2018-12-12 | End: 2019-11-15

## 2018-12-12 ASSESSMENT — MIFFLIN-ST. JEOR: SCORE: 1356.33

## 2018-12-17 DIAGNOSIS — E03.9 HYPOTHYROIDISM, UNSPECIFIED TYPE: ICD-10-CM

## 2018-12-17 NOTE — TELEPHONE ENCOUNTER
"Requested Prescriptions   Pending Prescriptions Disp Refills     levothyroxine (SYNTHROID/LEVOTHROID) 112 MCG tablet [Pharmacy Med Name: LEVOTHYROXINE 112 MCG TABLET] 90 tablet 3     Sig: TAKE 1 TABLET BY MOUTH DAILY.    Thyroid Protocol Failed - 12/17/2018  1:59 AM       Failed - Normal TSH on file in past 12 months    Recent Labs   Lab Test 12/02/17  1009   TSH 2.26             Passed - Patient is 12 years or older       Passed - Recent (12 mo) or future (30 days) visit within the authorizing provider's specialty    Patient had office visit in the last 12 months or has a visit in the next 30 days with authorizing provider or within the authorizing provider's specialty.  See \"Patient Info\" tab in inbasket, or \"Choose Columns\" in Meds & Orders section of the refill encounter.             Passed - No active pregnancy on record    If patient is pregnant or has had a positive pregnancy test, please check TSH.         Passed - No positive pregnancy test in past 12 months    If patient is pregnant or has had a positive pregnancy test, please check TSH.          Last Written Prescription Date:  12/5/17  Last Fill Quantity: 90,  # refills: 3   Last office visit: 12/12/2018 with prescribing provider:  12/12/18   Future Office Visit:      "

## 2018-12-17 NOTE — LETTER
[facility]  [address] [id number] [phone]            Faith Hernández  [address] [facility] [address]  [facility] [address]        December 18, 2018    Dear Faith,    While refilling your prescription today, we noticed that you are due to have labs drawn.  We will refill your prescription for 30 days, but a follow-up appointment must be made before any additional refills can be approved.     Taking care of your health is important to us and we look forward to seeing you in the near future.  Please call us at [phone] or [phone] (or use [email or web address]) to schedule an appointment.     Please disregard this notice if you have already made an appointment.    Sincerely,        [facility] [place] [place]

## 2018-12-18 RX ORDER — LEVOTHYROXINE SODIUM 112 UG/1
TABLET ORAL
Qty: 30 TABLET | Refills: 0 | Status: SHIPPED | OUTPATIENT
Start: 2018-12-18 | End: 2018-12-18

## 2018-12-18 RX ORDER — LEVOTHYROXINE SODIUM 112 UG/1
112 TABLET ORAL DAILY
Qty: 30 TABLET | Refills: 0 | Status: SHIPPED | OUTPATIENT
Start: 2018-12-18 | End: 2019-09-11 | Stop reason: DRUGHIGH

## 2018-12-31 DIAGNOSIS — E03.9 HYPOTHYROIDISM, UNSPECIFIED TYPE: ICD-10-CM

## 2018-12-31 DIAGNOSIS — Z00.00 ENCOUNTER FOR ROUTINE ADULT HEALTH EXAMINATION WITHOUT ABNORMAL FINDINGS: ICD-10-CM

## 2018-12-31 DIAGNOSIS — Z11.4 SCREENING FOR HIV (HUMAN IMMUNODEFICIENCY VIRUS): ICD-10-CM

## 2018-12-31 LAB
ALBUMIN SERPL-MCNC: 3.7 G/DL (ref 3.4–5)
ALP SERPL-CCNC: 64 U/L (ref 40–150)
ALT SERPL W P-5'-P-CCNC: 24 U/L (ref 0–50)
ANION GAP SERPL CALCULATED.3IONS-SCNC: 8 MMOL/L (ref 3–14)
AST SERPL W P-5'-P-CCNC: 15 U/L (ref 0–45)
BILIRUB SERPL-MCNC: 0.6 MG/DL (ref 0.2–1.3)
BUN SERPL-MCNC: 8 MG/DL (ref 7–30)
CALCIUM SERPL-MCNC: 8.6 MG/DL (ref 8.5–10.1)
CHLORIDE SERPL-SCNC: 105 MMOL/L (ref 94–109)
CHOLEST SERPL-MCNC: 197 MG/DL
CO2 SERPL-SCNC: 24 MMOL/L (ref 20–32)
CREAT SERPL-MCNC: 0.67 MG/DL (ref 0.52–1.04)
ERYTHROCYTE [DISTWIDTH] IN BLOOD BY AUTOMATED COUNT: 13 % (ref 10–15)
GFR SERPL CREATININE-BSD FRML MDRD: >90 ML/MIN/{1.73_M2}
GLUCOSE SERPL-MCNC: 93 MG/DL (ref 70–99)
HCT VFR BLD AUTO: 38.3 % (ref 35–47)
HDLC SERPL-MCNC: 65 MG/DL
HGB BLD-MCNC: 12.3 G/DL (ref 11.7–15.7)
LDLC SERPL CALC-MCNC: 94 MG/DL
MCH RBC QN AUTO: 27.5 PG (ref 26.5–33)
MCHC RBC AUTO-ENTMCNC: 32.1 G/DL (ref 31.5–36.5)
MCV RBC AUTO: 86 FL (ref 78–100)
NONHDLC SERPL-MCNC: 132 MG/DL
PLATELET # BLD AUTO: 334 10E9/L (ref 150–450)
POTASSIUM SERPL-SCNC: 3.9 MMOL/L (ref 3.4–5.3)
PROT SERPL-MCNC: 7 G/DL (ref 6.8–8.8)
RBC # BLD AUTO: 4.48 10E12/L (ref 3.8–5.2)
SODIUM SERPL-SCNC: 137 MMOL/L (ref 133–144)
T4 FREE SERPL-MCNC: 1.02 NG/DL (ref 0.76–1.46)
TRIGL SERPL-MCNC: 190 MG/DL
TSH SERPL DL<=0.005 MIU/L-ACNC: 16.66 MU/L (ref 0.4–4)
VIT B12 SERPL-MCNC: 1315 PG/ML (ref 193–986)
WBC # BLD AUTO: 9.2 10E9/L (ref 4–11)

## 2018-12-31 PROCEDURE — 82306 VITAMIN D 25 HYDROXY: CPT | Performed by: INTERNAL MEDICINE

## 2018-12-31 PROCEDURE — 85027 COMPLETE CBC AUTOMATED: CPT | Performed by: INTERNAL MEDICINE

## 2018-12-31 PROCEDURE — 80053 COMPREHEN METABOLIC PANEL: CPT | Performed by: INTERNAL MEDICINE

## 2018-12-31 PROCEDURE — 36415 COLL VENOUS BLD VENIPUNCTURE: CPT | Performed by: INTERNAL MEDICINE

## 2018-12-31 PROCEDURE — 84443 ASSAY THYROID STIM HORMONE: CPT | Performed by: INTERNAL MEDICINE

## 2018-12-31 PROCEDURE — 80061 LIPID PANEL: CPT | Performed by: INTERNAL MEDICINE

## 2018-12-31 PROCEDURE — 87389 HIV-1 AG W/HIV-1&-2 AB AG IA: CPT | Performed by: INTERNAL MEDICINE

## 2018-12-31 PROCEDURE — 82607 VITAMIN B-12: CPT | Performed by: INTERNAL MEDICINE

## 2018-12-31 PROCEDURE — 84439 ASSAY OF FREE THYROXINE: CPT | Performed by: INTERNAL MEDICINE

## 2019-01-10 DIAGNOSIS — E03.9 HYPOTHYROIDISM, UNSPECIFIED TYPE: ICD-10-CM

## 2019-01-10 NOTE — TELEPHONE ENCOUNTER
"Requested Prescriptions   Pending Prescriptions Disp Refills     levothyroxine (SYNTHROID/LEVOTHROID) 112 MCG tablet 30 tablet 0    Last Written Prescription Date:  12/18/18  Last Fill Quantity: 30,  # refills: 0   Last office visit: 12/12/2018 with prescribing provider:  12/12/18   Future Office Visit:     Sig: Take 1 tablet (112 mcg) by mouth daily    Thyroid Protocol Failed - 1/10/2019  9:48 AM       Failed - Normal TSH on file in past 12 months    Recent Labs   Lab Test 12/31/18  0854   TSH 16.66*             Passed - Patient is 12 years or older       Passed - Recent (12 mo) or future (30 days) visit within the authorizing provider's specialty    Patient had office visit in the last 12 months or has a visit in the next 30 days with authorizing provider or within the authorizing provider's specialty.  See \"Patient Info\" tab in inbasket, or \"Choose Columns\" in Meds & Orders section of the refill encounter.             Passed - Medication is active on med list       Passed - No active pregnancy on record    If patient is pregnant or has had a positive pregnancy test, please check TSH.         Passed - No positive pregnancy test in past 12 months    If patient is pregnant or has had a positive pregnancy test, please check TSH.            "

## 2019-01-14 RX ORDER — LEVOTHYROXINE SODIUM 112 UG/1
112 TABLET ORAL DAILY
Qty: 30 TABLET | Refills: 0 | OUTPATIENT
Start: 2019-01-14

## 2019-01-14 RX ORDER — LEVOTHYROXINE SODIUM 125 UG/1
125 TABLET ORAL DAILY
Qty: 30 TABLET | Refills: 11 | Status: SHIPPED | OUTPATIENT
Start: 2019-01-14 | End: 2019-11-02

## 2019-01-15 DIAGNOSIS — E03.9 HYPOTHYROIDISM, UNSPECIFIED TYPE: ICD-10-CM

## 2019-01-15 RX ORDER — LEVOTHYROXINE SODIUM 112 UG/1
TABLET ORAL
Qty: 30 TABLET | Refills: 0 | OUTPATIENT
Start: 2019-01-15

## 2019-01-15 NOTE — TELEPHONE ENCOUNTER
Spoke with patient.  Had been consistently taking levothyroxine 112 mcg prior to recent lab appointment.  TSH elevated.  We will therefore increase levothyroxine to 125 mcg daily.  Prescription faxed to patient's pharmacy.  Will have repeat nonfasting thyroid labs drawn in 6 weeks.  Lab ordered

## 2019-01-15 NOTE — TELEPHONE ENCOUNTER
"Requested Prescriptions   Pending Prescriptions Disp Refills     levothyroxine (SYNTHROID/LEVOTHROID) 112 MCG tablet [Pharmacy Med Name: LEVOTHYROXINE 112 MCG TABLET] 30 tablet 0     Sig: TAKE 1 TABLET BY MOUTH DAILY.    Thyroid Protocol Failed - 1/15/2019  1:29 AM       Failed - Normal TSH on file in past 12 months    Recent Labs   Lab Test 12/31/18  0854   TSH 16.66*             Passed - Patient is 12 years or older       Passed - Recent (12 mo) or future (30 days) visit within the authorizing provider's specialty    Patient had office visit in the last 12 months or has a visit in the next 30 days with authorizing provider or within the authorizing provider's specialty.  See \"Patient Info\" tab in inbasket, or \"Choose Columns\" in Meds & Orders section of the refill encounter.             Passed - Medication is active on med list       Passed - No active pregnancy on record    If patient is pregnant or has had a positive pregnancy test, please check TSH.         Passed - No positive pregnancy test in past 12 months    If patient is pregnant or has had a positive pregnancy test, please check TSH.          Last Written Prescription Date:  12/18/2018  Last Fill Quantity: 30,  # refills: 0   Last office visit: 12/12/2018 with prescribing provider:  12/12/2018   Future Office Visit:      "

## 2019-02-10 ENCOUNTER — MYC REFILL (OUTPATIENT)
Dept: INTERNAL MEDICINE | Facility: CLINIC | Age: 34
End: 2019-02-10

## 2019-02-10 DIAGNOSIS — Z30.9 ENCOUNTER FOR CONTRACEPTIVE MANAGEMENT, UNSPECIFIED TYPE: ICD-10-CM

## 2019-02-11 RX ORDER — LEVONORGESTREL/ETHIN.ESTRADIOL 0.1-0.02MG
1 TABLET ORAL DAILY
Qty: 84 TABLET | Refills: 2 | Status: SHIPPED | OUTPATIENT
Start: 2019-02-11 | End: 2019-07-10

## 2019-02-11 NOTE — TELEPHONE ENCOUNTER
"Requested Prescriptions   Pending Prescriptions Disp Refills     levonorgestrel-ethinyl estradiol (LARISSIA) 0.1-20 MG-MCG tablet 84 tablet 0     Sig: Take 1 tablet by mouth daily    Contraceptives Protocol Passed - 2/10/2019  4:11 PM       Passed - Patient is not a current smoker if age is 35 or older       Passed - Recent (12 mo) or future (30 days) visit within the authorizing provider's specialty    Patient had office visit in the last 12 months or has a visit in the next 30 days with authorizing provider or within the authorizing provider's specialty.  See \"Patient Info\" tab in inbasket, or \"Choose Columns\" in Meds & Orders section of the refill encounter.             Passed - Medication is active on med list       Passed - No active pregnancy on record       Passed - No positive pregnancy test in past 12 months        Prescription approved per [facility] [place].    Opal WHEELER RN, BSN, PHN      "

## 2019-03-06 DIAGNOSIS — E03.9 HYPOTHYROIDISM, UNSPECIFIED TYPE: ICD-10-CM

## 2019-03-06 LAB
T4 FREE SERPL-MCNC: 1.24 NG/DL (ref 0.76–1.46)
TSH SERPL DL<=0.005 MIU/L-ACNC: 4.01 MU/L (ref 0.4–4)

## 2019-03-06 PROCEDURE — 36415 COLL VENOUS BLD VENIPUNCTURE: CPT | Performed by: INTERNAL MEDICINE

## 2019-03-06 PROCEDURE — 84443 ASSAY THYROID STIM HORMONE: CPT | Performed by: INTERNAL MEDICINE

## 2019-03-06 PROCEDURE — 84439 ASSAY OF FREE THYROXINE: CPT | Performed by: INTERNAL MEDICINE

## 2019-07-10 ENCOUNTER — MYC REFILL (OUTPATIENT)
Dept: INTERNAL MEDICINE | Facility: CLINIC | Age: 34
End: 2019-07-10

## 2019-07-10 DIAGNOSIS — Z30.9 ENCOUNTER FOR CONTRACEPTIVE MANAGEMENT, UNSPECIFIED TYPE: ICD-10-CM

## 2019-07-10 RX ORDER — LEVONORGESTREL/ETHIN.ESTRADIOL 0.1-0.02MG
1 TABLET ORAL DAILY
Qty: 84 TABLET | Refills: 1 | Status: SHIPPED | OUTPATIENT
Start: 2019-07-10 | End: 2019-11-15

## 2019-07-10 NOTE — TELEPHONE ENCOUNTER
"Requested Prescriptions   Pending Prescriptions Disp Refills     levonorgestrel-ethinyl estradiol (LARISSIA) 0.1-20 MG-MCG tablet 84 tablet 2     Sig: Take 1 tablet by mouth daily       Contraceptives Protocol Passed - 7/10/2019 11:56 AM        Passed - Patient is not a current smoker if age is 35 or older        Passed - Recent (12 mo) or future (30 days) visit within the authorizing provider's specialty     Patient had office visit in the last 12 months or has a visit in the next 30 days with authorizing provider or within the authorizing provider's specialty.  See \"Patient Info\" tab in inbasket, or \"Choose Columns\" in Meds & Orders section of the refill encounter.              Passed - Medication is active on med list        Passed - No active pregnancy on record        Passed - No positive pregnancy test in past 12 months          Prescription approved per [facility] [place] [place] Refill Protocol.    Opal WHEELER RN, BSN, PHN      "

## 2019-11-02 DIAGNOSIS — Z00.00 LABORATORY EXAMINATION ORDERED AS PART OF A ROUTINE GENERAL MEDICAL EXAMINATION: ICD-10-CM

## 2019-11-02 DIAGNOSIS — E03.9 HYPOTHYROIDISM, UNSPECIFIED TYPE: ICD-10-CM

## 2019-11-02 DIAGNOSIS — Z13.1 SCREENING FOR DIABETES MELLITUS: Primary | ICD-10-CM

## 2019-11-02 NOTE — TELEPHONE ENCOUNTER
"Requested Prescriptions   Pending Prescriptions Disp Refills     levothyroxine (SYNTHROID/LEVOTHROID) 125 MCG tablet [Pharmacy Med Name: LEVOTHYROXINE 125 MCG TABLET] 90 tablet 3     Sig: TAKE 1 TABLET BY MOUTH DAILY   Last Written Prescription Date:  1/14/2019  Last Fill Quantity: 30,  # refills: 11   Last Office Visit: 12/12/2018   Future Office Visit:    Next 5 appointments (look out 90 days)    Nov 15, 2019  9:30 AM SOREN Varela with Michael [facility]as MD  [facility] [address] [address]  [phone]             Thyroid Protocol Failed - 11/2/2019  1:15 AM        Failed - Normal TSH on file in past 12 months     Recent Labs   Lab Test 03/06/19  0855   TSH 4.01*              Passed - Patient is 12 years or older        Passed - Recent (12 mo) or future (30 days) visit within the authorizing provider's specialty     Patient has had an office visit with the authorizing provider or a provider within the authorizing providers department within the previous 12 mos or has a future within next 30 days. See \"Patient Info\" tab in inbasket, or \"Choose Columns\" in Meds & Orders section of the refill encounter.              Passed - Medication is active on med list        Passed - No active pregnancy on record     If patient is pregnant or has had a positive pregnancy test, please check TSH.          Passed - No positive pregnancy test in past 12 months     If patient is pregnant or has had a positive pregnancy test, please check TSH.            "

## 2019-11-06 RX ORDER — LEVOTHYROXINE SODIUM 125 UG/1
TABLET ORAL
Qty: 30 TABLET | Refills: 0 | Status: SHIPPED | OUTPATIENT
Start: 2019-11-06 | End: 2019-11-16 | Stop reason: DRUGHIGH

## 2019-11-06 NOTE — TELEPHONE ENCOUNTER
Call pt. Last thyroid lab from March showed TSH a trace off but the FT4 hormone level was fine and med dose was continued then.  Pt due for recheck of thyroid function with nonfasting lab. Therefore I have refilled the levothyroxine for now with a 30 day supply in case dose change would need to be done in the near future. Pt has f/u appt with me 11/15/19 in clinic. Ask her to have a FASTING lab appt done at any [facility] between now and the appt with me 11/15/19 to recheck thyroid function and do screening labs for blood sugar, cholesterol, etc. Will review results with pt when I see he 11/15/19.   Based on that thyroid  lab result, will either adjust med dose of Levothyroxine or write for future 90 day suppiles of current dosage with refills. Assist pt with scheduling lab appt if she wishes  Or she may do online with Ruth herself

## 2019-11-15 ENCOUNTER — OFFICE VISIT (OUTPATIENT)
Dept: INTERNAL MEDICINE | Facility: CLINIC | Age: 34
End: 2019-11-15
Payer: COMMERCIAL

## 2019-11-15 VITALS
DIASTOLIC BLOOD PRESSURE: 74 MMHG | SYSTOLIC BLOOD PRESSURE: 122 MMHG | WEIGHT: 163 LBS | OXYGEN SATURATION: 99 % | HEART RATE: 91 BPM | BODY MASS INDEX: 30 KG/M2 | TEMPERATURE: 98.1 F | HEIGHT: 62 IN | RESPIRATION RATE: 16 BRPM

## 2019-11-15 DIAGNOSIS — Z00.00 ENCOUNTER FOR ROUTINE ADULT HEALTH EXAMINATION WITHOUT ABNORMAL FINDINGS: Primary | ICD-10-CM

## 2019-11-15 DIAGNOSIS — Z30.9 ENCOUNTER FOR CONTRACEPTIVE MANAGEMENT, UNSPECIFIED TYPE: ICD-10-CM

## 2019-11-15 DIAGNOSIS — E03.9 HYPOTHYROIDISM, UNSPECIFIED TYPE: ICD-10-CM

## 2019-11-15 LAB
ALBUMIN SERPL-MCNC: 3.7 G/DL (ref 3.4–5)
ALP SERPL-CCNC: 77 U/L (ref 40–150)
ALT SERPL W P-5'-P-CCNC: 32 U/L (ref 0–50)
ANION GAP SERPL CALCULATED.3IONS-SCNC: 10 MMOL/L (ref 3–14)
AST SERPL W P-5'-P-CCNC: 21 U/L (ref 0–45)
BILIRUB SERPL-MCNC: 0.6 MG/DL (ref 0.2–1.3)
BUN SERPL-MCNC: 8 MG/DL (ref 7–30)
CALCIUM SERPL-MCNC: 9 MG/DL (ref 8.5–10.1)
CHLORIDE SERPL-SCNC: 107 MMOL/L (ref 94–109)
CHOLEST SERPL-MCNC: 197 MG/DL
CO2 SERPL-SCNC: 21 MMOL/L (ref 20–32)
CREAT SERPL-MCNC: 0.74 MG/DL (ref 0.52–1.04)
GFR SERPL CREATININE-BSD FRML MDRD: >90 ML/MIN/{1.73_M2}
GLUCOSE SERPL-MCNC: 89 MG/DL (ref 70–99)
HDLC SERPL-MCNC: 59 MG/DL
LDLC SERPL CALC-MCNC: 97 MG/DL
NONHDLC SERPL-MCNC: 138 MG/DL
POTASSIUM SERPL-SCNC: 4 MMOL/L (ref 3.4–5.3)
PROT SERPL-MCNC: 7.1 G/DL (ref 6.8–8.8)
SODIUM SERPL-SCNC: 138 MMOL/L (ref 133–144)
T4 FREE SERPL-MCNC: 1.23 NG/DL (ref 0.76–1.46)
TRIGL SERPL-MCNC: 203 MG/DL
TSH SERPL DL<=0.005 MIU/L-ACNC: 6.33 MU/L (ref 0.4–4)

## 2019-11-15 PROCEDURE — 36415 COLL VENOUS BLD VENIPUNCTURE: CPT | Performed by: INTERNAL MEDICINE

## 2019-11-15 PROCEDURE — 84439 ASSAY OF FREE THYROXINE: CPT | Performed by: INTERNAL MEDICINE

## 2019-11-15 PROCEDURE — 80061 LIPID PANEL: CPT | Performed by: INTERNAL MEDICINE

## 2019-11-15 PROCEDURE — 99213 OFFICE O/P EST LOW 20 MIN: CPT | Mod: 25 | Performed by: INTERNAL MEDICINE

## 2019-11-15 PROCEDURE — 99395 PREV VISIT EST AGE 18-39: CPT | Performed by: INTERNAL MEDICINE

## 2019-11-15 PROCEDURE — 84443 ASSAY THYROID STIM HORMONE: CPT | Performed by: INTERNAL MEDICINE

## 2019-11-15 PROCEDURE — 80053 COMPREHEN METABOLIC PANEL: CPT | Performed by: INTERNAL MEDICINE

## 2019-11-15 RX ORDER — LEVONORGESTREL/ETHIN.ESTRADIOL 0.1-0.02MG
1 TABLET ORAL DAILY
Qty: 84 TABLET | Refills: 3 | Status: CANCELLED | OUTPATIENT
Start: 2019-11-15

## 2019-11-15 RX ORDER — LEVONORGESTREL/ETHIN.ESTRADIOL 0.1-0.02MG
1 TABLET ORAL DAILY
Qty: 84 TABLET | Refills: 3 | Status: SHIPPED | OUTPATIENT
Start: 2019-11-15 | End: 2020-04-30

## 2019-11-15 RX ORDER — LEVOTHYROXINE SODIUM 125 UG/1
125 TABLET ORAL DAILY
Qty: 90 TABLET | Refills: 3 | Status: CANCELLED | OUTPATIENT
Start: 2019-11-15

## 2019-11-15 ASSESSMENT — MIFFLIN-ST. JEOR: SCORE: 1387.61

## 2019-11-15 NOTE — PROGRESS NOTES
SUBJECTIVE:   CC: Faith Hernández is an 34 year old woman who presents for preventive health visit.     Healthy Habits:    Getting at least 3 servings of Calcium per day:  Yes    Bi-annual eye exam:  NO    Dental care twice a year:  Yes    Sleep apnea or symptoms of sleep apnea:  None    Diet:  Regular (no restrictions)    Frequency of exercise:  None    Duration of exercise:  N/A    Taking medications regularly:  Yes    Barriers to taking medications:  None    Medication side effects:  Not applicable    PHQ-2 Total Score:    Additional concerns today:  No        Today's PHQ-2 Score:   PHQ-2 ( 1999 Pfizer) 12/12/2018   Q1: Little interest or pleasure in doing things 0   Q2: Feeling down, depressed or hopeless 0   PHQ-2 Score 0   Q1: Little interest or pleasure in doing things Not at all   Q2: Feeling down, depressed or hopeless Not at all   PHQ-2 Score 0       Abuse: Current or Past(Physical, Sexual or Emotional)- No  Do you feel safe in your environment? Yes    Social History     Tobacco Use     Smoking status: Never Smoker     Smokeless tobacco: Never Used   Substance Use Topics     Alcohol use: Yes     Comment: 6-8 weekly     If you drink alcohol do you typically have >3 drinks per day or >7 drinks per week? Yes      Alcohol Use 12/12/2018   Prescreen: >3 drinks/day or >7 drinks/week? Yes   Prescreen: >3 drinks/day or >7 drinks/week? -     AUDIT - Alcohol Use Disorders Identification Test - Reproduced from the World Health Organization Audit 2001 (Second Edition) 11/15/2019   1.  How often do you have a drink containing alcohol? 4 or more times a week   2.  How many drinks containing alcohol do you have on a typical day when you are drinking? 3 or 4   3.  How often do you have five or more drinks on one occasion? Monthly   4.  How often during the last year have you found that you were not able to stop drinking once you had started? Never   5.  How often during the last year have you failed to do what was  normally expected of you because of drinking? Never   6.  How often during the last year have you needed a first drink in the morning to get yourself going after a heavy drinking session? Never   7.  How often during the last year have you had a feeling of guilt or remorse after drinking? Never   8.  How often during the last year have you been unable to remember what happened the night before because of your drinking? Never   9.  Have you or someone else been injured because of your drinking? No   10. Has a relative, friend, doctor or other health care worker been concerned about your drinking or suggested you cut down? No   TOTAL SCORE 7       Reviewed orders with patient.  Reviewed health maintenance and updated orders accordingly - Yes  Labs reviewed in EPIC    Mammogram not appropriate for this patient based on age.    Pertinent mammograms are reviewed under the imaging tab.  History of abnormal Pap smear: NO - age 30-65 PAP every 5 years with negative HPV co-testing recommended  PAP / HPV Latest Ref Rng & Units 10/21/2016 6/14/2013   PAP - NIL NIL   HPV 16 DNA NEG Negative -   HPV 18 DNA NEG Negative -   OTHER HR HPV NEG Negative -     Reviewed and updated as needed this visit by clinical staff         Reviewed and updated as needed this visit by Provider            Review of Systems  CONSTITUTIONAL: NEGATIVE for fever, chills.  Weight up 12 pounds in 1 year  INTEGUMENTARU/SKIN: NEGATIVE for worrisome rashes, moles or lesions  EYES: NEGATIVE for vision changes or irritation  ENT: NEGATIVE for ear, mouth and throat problems  RESP: NEGATIVE for significant cough or SOB  BREAST: NEGATIVE for masses, tenderness or discharge  CV: NEGATIVE for chest pain, palpitations or peripheral edema  GI: NEGATIVE for nausea, abdominal pain, heartburn, or change in bowel habits  : NEGATIVE for unusual urinary or vaginal symptoms. Periods are regular. LMP 2 weeks ago  MUSCULOSKELETAL: NEGATIVE for significant arthralgias or  "myalgia  NEURO: NEGATIVE for weakness, dizziness or paresthesias overall. Rare tingle in fingers. None now  PSYCHIATRIC: NEGATIVE for changes in mood or affect     OBJECTIVE:   /74   Pulse 91   Temp 98.1  F (36.7  C) (Temporal)   Resp 16   Ht 1.575 m (5' 2\")   Wt 73.9 kg (163 lb)   SpO2 99%   BMI 29.81 kg/m    Physical Exam  General appearance - healthy, alert, no distress  Skin - No rashes or lesions.  Head - normocephalic, atraumatic  Eyes - PEACE, EOMI, fundi exam with nondilated pupils negative.  Ears - External ears normal. Canals clear. TM's normal.  Nose/Sinuses - Nares normal. Septum midline. Mucosa normal. No drainage or sinus tenderness.  Oropharynx - No erythema, no adenopathy, no exudates.  Neck - Supple without adenopathy or thyromegaly. No bruits.  Lungs - Clear to auscultation without wheezes/rhonchi.  Heart - Regular rate and rhythm without murmurs, clicks, or gallops.  Nodes - No supraclavicular, axillary, or inguinal adenopathy palpable.  Breasts - deferred  Abdomen - Abdomen soft, non-tender. BS normal. No masses or hepatosplenomegaly palpable. No bruits.  Extremities -No cyanosis, clubbing or edema.    Musculoskeletal - Spine ROM normal. Muscular strength intact.   Peripheral pulses - radial=4/4, femoral=4/4, posterior tibial=4/4, dorsalis pedis=4/4,  Neuro - Gait normal. Reflexes normal and symmetric. Sensation grossly WNL.  Genital/Rectal - deferred        ASSESSMENT/PLAN:   1. Encounter for routine adult health examination without abnormal findings   Screening labs as ordered. Pt declines lfu vaccine. Other HCM UTD. Counseled to limit alcohol to max 2 drinks per setting and to limit to 3x/week. Counseled re: carb/calorie reduction and increase in frequency walking or other exercise  - Lipid panel reflex to direct LDL Fasting  - Comprehensive metabolic panel    2. Hypothyroidism, unspecified type   On Levothyroxine. Weight gain recently. TSH elevated per lab today. Will increase " "Levothyroxine to 137,cg daily and recheck 6 weeks  - TSH with free T4 reflex  - levothyroxine (SYNTHROID/LEVOTHROID) 137 MCG tablet; Take 1 tablet (137 mcg) by mouth daily  Dispense: 30 tablet; Refill: 11  - TSH with free T4 reflex; Future    3. Encounter for contraceptive management, unspecified type   Pap/pelvic UTD. Continue OCP  - levonorgestrel-ethinyl estradiol (LARISSIA) 0.1-20 MG-MCG tablet; Take 1 tablet by mouth daily  Dispense: 84 tablet; Refill: 3    COUNSELING:  Reviewed preventive health counseling, as reflected in patient instructions  Special attention given to:        Regular exercise       Healthy diet/nutrition       Alcohol Use    Estimated body mass index is 28.35 kg/m  as calculated from the following:    Height as of 12/12/18: 1.575 m (5' 2\").    Weight as of 12/12/18: 70.3 kg (155 lb).    Weight management plan: Discussed healthy diet and exercise guidelines     reports that she has never smoked. She has never used smokeless tobacco.      Counseling Resources:  ATP IV Guidelines  Pooled Cohorts Equation Calculator  Breast Cancer Risk Calculator  FRAX Risk Assessment  ICSI Preventive Guidelines  Dietary Guidelines for Americans, 2010  USDA's MyPlate  ASA Prophylaxis  Lung CA Screening      PLAN:   Stop Levothyroxine 125mcg tabs and start Levothyroxine 137mcg tab, 1 tab daily for thyroid function  Continue other meds.  Prescriptions refilled.    Call  [phone] or use [facility] to schedule a future lab appointment  non-fasting in 6 weeks to recheck your thyroid function with the higher Levothroxine dosage.   Reduce calorie/carbohydrate (sugar, bread, potato, pasta, rice, alcohol etc)  intake in diet.  Increase color on your plate with fruits and vegetables. Increase  frequency of walking or other aerobic exercise as able (goal is daily)   Limit alcohol intake to 2 drinks per sitting and  a total of 7 drinks per week or less  Pt was informed regarding extra E&M billing for management of new or " established medical issues not related to today's wellness visit      Michael Arenas MD  [facility]

## 2019-11-16 RX ORDER — LEVOTHYROXINE SODIUM 137 UG/1
137 TABLET ORAL DAILY
Qty: 30 TABLET | Refills: 11 | Status: SHIPPED | OUTPATIENT
Start: 2019-11-16 | End: 2020-09-01

## 2019-11-16 NOTE — PATIENT INSTRUCTIONS
Stop Levothyroxine 125mcg tabs and start Levothyroxine 137mcg tab, 1 tab daily for thyroid function  Continue other meds.  Prescriptions refilled.    Call  [phone] or use [facility] to schedule a future lab appointment  non-fasting in 6 weeks to recheck your thyroid function with the higher Levothroxine dosage.   Reduce calorie/carbohydrate (sugar, bread, potato, pasta, rice, alcohol etc)  intake in diet.  Increase color on your plate with fruits and vegetables. Increase  frequency of walking or other aerobic exercise as able (goal is daily)   Limit alcohol intake to 2 drinks per sitting and  a total of 7 drinks per week or less  Pt was informed regarding extra E&M billing for management of new or established medical issues not related to today's wellness visit

## 2020-01-06 ENCOUNTER — MYC MEDICAL ADVICE (OUTPATIENT)
Dept: INTERNAL MEDICINE | Facility: CLINIC | Age: 35
End: 2020-01-06

## 2020-01-06 NOTE — TELEPHONE ENCOUNTER
PCP please advise of MyChart message regarding upset stomach prior to travel.    Triage advised patient to be seen in clinic for prophylactic medications/vaccines prior travel to [place].     Opal VARGASN, RN, PHN

## 2020-01-11 DIAGNOSIS — E03.9 HYPOTHYROIDISM, UNSPECIFIED TYPE: ICD-10-CM

## 2020-01-11 LAB — TSH SERPL DL<=0.005 MIU/L-ACNC: 3.81 MU/L (ref 0.4–4)

## 2020-01-11 PROCEDURE — 84443 ASSAY THYROID STIM HORMONE: CPT | Performed by: INTERNAL MEDICINE

## 2020-01-11 PROCEDURE — 36415 COLL VENOUS BLD VENIPUNCTURE: CPT | Performed by: INTERNAL MEDICINE

## 2020-04-18 DIAGNOSIS — E03.9 HYPOTHYROIDISM, UNSPECIFIED TYPE: Primary | ICD-10-CM

## 2020-04-30 ENCOUNTER — MYC REFILL (OUTPATIENT)
Dept: INTERNAL MEDICINE | Facility: CLINIC | Age: 35
End: 2020-04-30

## 2020-04-30 DIAGNOSIS — Z30.9 ENCOUNTER FOR CONTRACEPTIVE MANAGEMENT, UNSPECIFIED TYPE: ICD-10-CM

## 2020-04-30 RX ORDER — LEVONORGESTREL/ETHIN.ESTRADIOL 0.1-0.02MG
1 TABLET ORAL DAILY
Qty: 84 TABLET | Refills: 1 | Status: SHIPPED | OUTPATIENT
Start: 2020-04-30 | End: 2020-07-15

## 2020-07-04 ENCOUNTER — VIRTUAL VISIT (OUTPATIENT)
Dept: FAMILY MEDICINE | Facility: OTHER | Age: 35
End: 2020-07-04

## 2020-07-04 NOTE — PROGRESS NOTES
"Date: 2020 15:01:30  Clinician: Jasbir Nelson  Clinician NPI: [id number]  Patient: Faith Hernández  Patient : 1985  Patient Address: [address] [place] [id number]  Patient Phone: [phone]  Visit Protocol: URI  Patient Summary:  Faith is a 34 year old ( : 1985 ) female who initiated a Visit for COVID-19 (Coronavirus) evaluation and screening. When asked the question \"Please sign me up to receive news, health information and promotions. \", Faith responded \"No\".    When asked when her symptoms started, Faith reported that she does not have any symptoms.   She denies having recent facial or sinus surgery in the past 60 days and taking antibiotic medication in the past month.    Pertinent COVID-19 (Coronavirus) information  In the past 14 days, Faith has not worked in a congregate living setting.   She does not work or volunteer as healthcare worker or a  and does not work or volunteer in a healthcare facility.   Faith also has not lived in a congregate living setting in the past 14 days. She does not live with a healthcare worker.   Faith has had a close contact with a laboratory-confirmed COVID-19 patient in the last 14 days. Additional information about contact with COVID-19 (Coronavirus) patient as reported by the patient (free text): Brother was tested positive, engaged with him at a small family gathering   Pertinent medical history  Faith does not get yeast infections when she takes antibiotics.   Faith does not need a return to work/school note.   Weight: 160 lbs   Faith does not smoke or use smokeless tobacco.   She denies pregnancy and denies breastfeeding. Her last period was over a month ago.   Weight: 160 lbs    MEDICATIONS: Larissia oral, levothyroxine oral, ALLERGIES: NKDA  Clinician Response:  Dear Faith,   Your symptoms show that you may have coronavirus (COVID-19). This illness can cause fever, cough and trouble breathing. Many " "people get a mild case and get better on their own. Some people can get very sick.  What should I do?  We would like to test you for this virus.   1. Please call [phone] to schedule your visit. Explain that you were referred by [facility] to have a COVID-19 test. Be ready to share your [facility] visit ID number.  The following will serve as your written order for this COVID Test, ordered by me, for the indication of suspected COVID [Z20.828]: The test will be ordered in [facility], our electronic health record, after you are scheduled. It will show as ordered and authorized by Javier Chaves MD.  Order: COVID-19 (Coronavirus) PCR for SYMPTOMATIC testing from [facility].      2. When it's time for your COVID test:  Stay at least 6 feet away from others. (If someone will drive you to your test, stay in the backseat, as far away from the  as you can.)   Cover your mouth and nose with a mask, tissue or washcloth.  Go straight to the testing site. Don't make any stops on the way there or back.      3.Starting now: Stay home and away from others (self-isolate) until:   You've had no fever---and no medicine that reduces fever---for 3 full days (72 hours). And...   Your other symptoms have gotten better. For example, your cough or breathing has improved. And...   At least 10 days have passed since your symptoms started.       During this time, don't leave the house except for testing or medical care.   Stay in your own room, even for meals. Use your own bathroom if you can.   Stay away from others in your home. No hugging, kissing or shaking hands. No visitors.  Don't go to work, school or anywhere else.    Clean \"high touch\" surfaces often (doorknobs, counters, handles, etc.). Use a household cleaning spray or wipes. You'll find a full list of  on the EPA website: www.epa.gov/pesticide-registration/list-n-disinfectants-use-against-sars-cov-2.   Cover your mouth and nose with a mask, tissue or washcloth to avoid spreading " germs.  Wash your hands and face often. Use soap and water.  Caregivers in these groups are at risk for severe illness due to COVID-19:  o People 65 years and older  o People who live in a nursing home or long-term care facility  o People with chronic disease (lung, heart, cancer, diabetes, kidney, liver, immunologic)  o People who have a weakened immune system, including those who:   Are in cancer treatment  Take medicine that weakens the immune system, such as corticosteroids  Had a bone marrow or organ transplant  Have an immune deficiency  Have poorly controlled HIV or AIDS  Are obese (body mass index of 40 or higher)  Smoke regularly   o Caregivers should wear gloves while washing dishes, handling laundry and cleaning bedrooms and bathrooms.  o Use caution when washing and drying laundry: Don't shake dirty laundry, and use the warmest water setting that you can.  o For more tips, go to www.cdc.gov/coronavirus/2019-ncov/downloads/10Things.pdf.    4.Sign up for [employer]. We know it's scary to hear that you might have COVID-19. We want to track your symptoms to make sure you're okay over the next 2 weeks. Please look for an email from [employer]---this is a free, online program that we'll use to keep in touch. To sign up, follow the link in the email. Learn more at [email or web address]  How can I take care of myself?   Get lots of rest. Drink extra fluids (unless a doctor has told you not to).   Take Tylenol (acetaminophen) for fever or pain. If you have liver or kidney problems, ask your family doctor if it's okay to take Tylenol.   Adults can take either:    650 mg (two 325 mg pills) every 4 to 6 hours, or...   1,000 mg (two 500 mg pills) every 8 hours as needed.    Note: Don't take more than 3,000 mg in one day. Acetaminophen is found in many medicines (both prescribed and over-the-counter medicines). Read all labels to be sure you don't take too much.   For children, check the Tylenol bottle  for the right dose. The dose is based on the child's age or weight.    If you have other health problems (like cancer, heart failure, an organ transplant or severe kidney disease): Call your specialty clinic if you don't feel better in the next 2 days.       Know when to call 911. Emergency warning signs include:    Trouble breathing or shortness of breath Pain or pressure in the chest that doesn't go away Feeling confused like you haven't felt before, or not being able to wake up Bluish-colored lips or face.  Where can I get more information?   [facility] -- About COVID-19: [email or web address]   CDC -- What to Do If You're Sick: www.cdc.gov/coronavirus/2019-ncov/about/steps-when-sick.html   [facility] -- Ending Home Isolation: www.cdc.gov/coronavirus/2019-ncov/hcp/disposition-in-home-patients.html   [facility] -- Caring for Someone: www.cdc.gov/coronavirus/2019-ncov/if-you-are-sick/care-for-someone.html   [facility] -- Interim Guidance for Hospital Discharge to Home: www.[facility].mn./diseases/coronavirus/hcp/hospdischarge.pdf   [facility] clinical trials (COVID-19 research studies): clinicalaffairs.[facility]-clinical-trials    Below are the COVID-19 hotlines at the [place] Department of Health ([facility]). Interpreters are available.    For health questions: Call 817-316-3686 or 1-403.839.4753 (7 a.m. to 7 p.m.) For questions about schools and childcare: Call 897-833-8811 or 1-531.102.8147 (7 a.m. to 7 p.m.)    Diagnosis: Contact with and (suspected) exposure to other viral communicable diseases  Diagnosis ICD: Z20.828

## 2020-07-08 DIAGNOSIS — Z20.822 SUSPECTED COVID-19 VIRUS INFECTION: Primary | ICD-10-CM

## 2020-07-08 PROCEDURE — U0003 INFECTIOUS AGENT DETECTION BY NUCLEIC ACID (DNA OR RNA); SEVERE ACUTE RESPIRATORY SYNDROME CORONAVIRUS 2 (SARS-COV-2) (CORONAVIRUS DISEASE [COVID-19]), AMPLIFIED PROBE TECHNIQUE, MAKING USE OF HIGH THROUGHPUT TECHNOLOGIES AS DESCRIBED BY CMS-2020-01-R: HCPCS | Performed by: FAMILY MEDICINE

## 2020-07-08 NOTE — LETTER
July 12, 2020        Faith KAREN Napiersophia  [address] [address]    This letter provides a written record that you were tested for COVID-19 on 7/8/20.       Your result was negative. This means that we didn t find the virus that causes COVID-19 in your sample. A test may show negative when you do actually have the virus. This can happen when the virus is in the early stages of infection, before you feel illness symptoms.    If you have symptoms   Stay home and away from others (self-isolate) until you meet ALL of the guidelines below:    You ve had no fever--and no medicine that reduces fever--for 3 full days (72 hours). And      Your other symptoms have gotten better. For example, your cough or breathing has improved. And     At least 10 days have passed since your symptoms started.    During this time:    Stay home. Don t go to work, school or anywhere else.     Stay in your own room, including for meals. Use your own bathroom if you can.    Stay away from others in your home. No hugging, kissing or shaking hands. No visitors.    Clean  high touch  surfaces often (doorknobs, counters, handles, etc.). Use a household cleaning spray or wipes. You can find a full list on the EPA website at www.epa.gov/pesticide-registration/list-n-disinfectants-use-against-sars-cov-2.    Cover your mouth and nose with a mask, tissue or washcloth to avoid spreading germs.    Wash your hands and face often with soap and water.    Going back to work  Check with your employer for any guidelines to follow for going back to work.    Employers: This document serves as formal notice that your employee tested negative for COVID-19, as of the testing date shown above.

## 2020-07-09 LAB
SARS-COV-2 RNA SPEC QL NAA+PROBE: NOT DETECTED
SPECIMEN SOURCE: NORMAL

## 2020-07-10 ENCOUNTER — MYC MEDICAL ADVICE (OUTPATIENT)
Dept: INTERNAL MEDICINE | Facility: CLINIC | Age: 35
End: 2020-07-10

## 2020-07-15 ENCOUNTER — MYC REFILL (OUTPATIENT)
Dept: INTERNAL MEDICINE | Facility: CLINIC | Age: 35
End: 2020-07-15

## 2020-07-15 DIAGNOSIS — Z30.9 ENCOUNTER FOR CONTRACEPTIVE MANAGEMENT, UNSPECIFIED TYPE: ICD-10-CM

## 2020-07-16 RX ORDER — LEVONORGESTREL/ETHIN.ESTRADIOL 0.1-0.02MG
1 TABLET ORAL DAILY
Qty: 84 TABLET | Refills: 0 | Status: SHIPPED | OUTPATIENT
Start: 2020-07-16 | End: 2020-09-29

## 2020-09-29 ENCOUNTER — MYC REFILL (OUTPATIENT)
Dept: INTERNAL MEDICINE | Facility: CLINIC | Age: 35
End: 2020-09-29

## 2020-09-29 DIAGNOSIS — Z30.9 ENCOUNTER FOR CONTRACEPTIVE MANAGEMENT, UNSPECIFIED TYPE: ICD-10-CM

## 2020-09-30 RX ORDER — LEVONORGESTREL/ETHIN.ESTRADIOL 0.1-0.02MG
1 TABLET ORAL DAILY
Qty: 84 TABLET | Refills: 0 | Status: SHIPPED | OUTPATIENT
Start: 2020-09-30 | End: 2020-12-22

## 2020-10-11 ENCOUNTER — VIRTUAL VISIT (OUTPATIENT)
Dept: FAMILY MEDICINE | Facility: OTHER | Age: 35
End: 2020-10-11

## 2020-10-11 NOTE — PROGRESS NOTES
"Date: 10/11/2020 10:59:52  Clinician: Jasbir Nelson  Clinician NPI: [id number]  Patient: Faith Hernández  Patient : 1985  Patient Address: [address] [place] [id number]  Patient Phone: [phone]  Visit Protocol: URI  Patient Summary:  Faith is a 35 year old ( : 1985 ) female who initiated a [facility] Visit for COVID-19 (Coronavirus) evaluation and screening. When asked the question \"Please sign me up to receive news, health information and promotions. \", Faith responded \"No\".    Faith states her symptoms started gradually 3-4 days ago.   Her symptoms consist of nasal congestion, rhinitis, malaise, and a sore throat. She is experiencing difficulty breathing due to nasal congestion but she is not short of breath.   Symptom details     Nasal secretions: The color of her mucus is clear.    Sore throat: Faith reports having mild throat pain (1-3 on a 10 point pain scale), does not have exudate on her tonsils, and can swallow liquids. The lymph nodes in her neck are not enlarged. A rash has not appeared on the skin since the sore throat started.      Faith denies having ear pain, headache, wheezing, fever, enlarged lymph nodes, cough, anosmia, vomiting, nausea, facial pain or pressure, myalgias, chills, teeth pain, ageusia, and diarrhea. She also denies double sickening (worsening symptoms after initial improvement), taking antibiotic medication in the past month, and having recent facial or sinus surgery in the past 60 days.   Precipitating events  Within the past week, Faith has not been exposed to someone with strep throat.   Pertinent COVID-19 (Coronavirus) information  In the past 14 days, Faith has not worked in a congregate living setting.   She does not work or volunteer as healthcare worker or a  and does not work or volunteer in a healthcare facility.   Faith also has not lived in a congregate living setting in the past 14 days. She does not live " with a healthcare worker.   Faith has had a close contact with a laboratory-confirmed COVID-19 patient within 14 days of symptom onset. Additional information about contact with COVID-19 (Coronavirus) patient as reported by the patient (free text): Friend at home   Since December 2019, Faith and has not had upper respiratory infection or influenza-like illness. Has not been diagnosed with lab-confirmed COVID-19 test   Pertinent medical history  Faith does not get yeast infections when she takes antibiotics.   Faith does not need a return to work/school note.   Weight: 160 lbs   Faith does not smoke or use smokeless tobacco.   She denies pregnancy and denies breastfeeding. She has menstruated in the past month.   Weight: 160 lbs    MEDICATIONS: Larissia oral, levothyroxine oral, ALLERGIES: NKDA  Clinician Response:  Dear Faith,   Your symptoms show that you may have coronavirus (COVID-19). This illness can cause fever, cough and trouble breathing. Many people get a mild case and get better on their own. Some people can get very sick.  What should I do?  We would like to test you for this virus.   1. Please call [phone] to schedule your visit. Explain that you were referred by [facility] to have a COVID-19 test. Be ready to share [facility].  The following will serve as your written order for this COVID Test, ordered by me, for the indication of suspected COVID [Z20.828]: The test will be ordered in [facility], our electronic health record, after you are scheduled. It will show as ordered and authorized by Javier Chaves MD.  Order: COVID-19 (Coronavirus) PCR for SYMPTOMATIC testing from [facility].      2. When it's time for your COVID test:  Stay at least 6 feet away from others. (If someone will drive you to your test, stay in the backseat, as far away from the  as you can.)   Cover your mouth and nose with a mask, tissue or washcloth.  Go straight to the testing site. Don't make any stops on  "the way there or back.      3.Starting now: Stay home and away from others (self-isolate) until:   You've had no fever---and no medicine that reduces fever---for one full day (24 hours). And...   Your other symptoms have gotten better. For example, your cough or breathing has improved. And...   At least 10 days have passed since your symptoms started.       During this time, don't leave the house except for testing or medical care.   Stay in your own room, even for meals. Use your own bathroom if you can.   Stay away from others in your home. No hugging, kissing or shaking hands. No visitors.  Don't go to work, school or anywhere else.    Clean \"high touch\" surfaces often (doorknobs, counters, handles, etc.). Use a household cleaning spray or wipes. You'll find a full list of  on the EPA website: www.epa.gov/pesticide-registration/list-n-disinfectants-use-against-sars-cov-2.   Cover your mouth and nose with a mask, tissue or washcloth to avoid spreading germs.  Wash your hands and face often. Use soap and water.  Caregivers in these groups are at risk for severe illness due to COVID-19:  o People 65 years and older  o People who live in a nursing home or long-term care facility  o People with chronic disease (lung, heart, cancer, diabetes, kidney, liver, immunologic)  o People who have a weakened immune system, including those who:   Are in cancer treatment  Take medicine that weakens the immune system, such as corticosteroids  Had a bone marrow or organ transplant  Have an immune deficiency  Have poorly controlled HIV or AIDS  Are obese (body mass index of 40 or higher)  Smoke regularly   o Caregivers should wear gloves while washing dishes, handling laundry and cleaning bedrooms and bathrooms.  o Use caution when washing and drying laundry: Don't shake dirty laundry, and use the warmest water setting that you can.  o For more tips, go to www.cdc.gov/coronavirus/2019-ncov/downloads/10Things.pdf.    How can " I take care of myself?    Get lots of rest. Drink extra fluids (unless a doctor has told you not to).   Take Tylenol (acetaminophen) for fever or pain. If you have liver or kidney problems, ask your family doctor if it's okay to take Tylenol.   Adults can take either:    650 mg (two 325 mg pills) every 4 to 6 hours, or...   1,000 mg (two 500 mg pills) every 8 hours as needed.    Note: Don't take more than 3,000 mg in one day. Acetaminophen is found in many medicines (both prescribed and over-the-counter medicines). Read all labels to be sure you don't take too much.   For children, check the Tylenol bottle for the right dose. The dose is based on the child's age or weight.    If you have other health problems (like cancer, heart failure, an organ transplant or severe kidney disease): Call your specialty clinic if you don't feel better in the next 2 days.       Know when to call 911. Emergency warning signs include:    Trouble breathing or shortness of breath Pain or pressure in the chest that doesn't go away Feeling confused like you haven't felt before, or not being able to wake up Bluish-colored lips or face.  Where can I get more information?   [facility] -- About COVID-19: [email or web address]   CDC -- What to Do If You're Sick: www.cdc.gov/coronavirus/2019-ncov/about/steps-when-sick.html   CDC -- Ending Home Isolation: www.cdc.gov/coronavirus/2019-ncov/hcp/disposition-in-home-patients.html   CDC -- Caring for Someone: www.cdc.gov/coronavirus/2019-ncov/if-you-are-sick/care-for-someone.html   [facility] -- Interim Guidance for Hospital Discharge to Home: www.health.[facility].[place]/diseases/coronavirus/hcp/hospdischarge.pdf   [facility] clinical trials (COVID-19 research studies): clinicalaffairs.[facility]/umn-clinical-trials    Below are the COVID-19 hotlines at the [place] Department of Health ([facility]). Interpreters are available.    For health questions: Call 755-884-4839 or [phone] (7  a.m. to 7 p.m.) For questions about schools and childcare: Call 259-495-9549 or 1-699.889.5602 (7 a.m. to 7 p.m.)    Diagnosis: Other malaise  Diagnosis ICD: R53.81

## 2020-10-12 ENCOUNTER — AMBULATORY - HEALTHEAST (OUTPATIENT)
Dept: FAMILY MEDICINE | Facility: CLINIC | Age: 35
End: 2020-10-12

## 2020-10-12 DIAGNOSIS — Z20.822 SUSPECTED COVID-19 VIRUS INFECTION: ICD-10-CM

## 2020-10-13 ENCOUNTER — AMBULATORY - HEALTHEAST (OUTPATIENT)
Dept: FAMILY MEDICINE | Facility: CLINIC | Age: 35
End: 2020-10-13

## 2020-10-13 DIAGNOSIS — Z20.822 SUSPECTED COVID-19 VIRUS INFECTION: ICD-10-CM

## 2020-10-16 ENCOUNTER — COMMUNICATION - HEALTHEAST (OUTPATIENT)
Dept: SCHEDULING | Facility: CLINIC | Age: 35
End: 2020-10-16

## 2020-10-22 ENCOUNTER — COMMUNICATION - HEALTHEAST (OUTPATIENT)
Dept: SCHEDULING | Facility: CLINIC | Age: 35
End: 2020-10-22

## 2020-10-22 ENCOUNTER — NURSE TRIAGE (OUTPATIENT)
Dept: NURSING | Facility: CLINIC | Age: 35
End: 2020-10-22

## 2020-10-22 ENCOUNTER — MYC MEDICAL ADVICE (OUTPATIENT)
Dept: INTERNAL MEDICINE | Facility: CLINIC | Age: 35
End: 2020-10-22

## 2020-12-21 ENCOUNTER — MYC REFILL (OUTPATIENT)
Dept: INTERNAL MEDICINE | Facility: CLINIC | Age: 35
End: 2020-12-21

## 2020-12-21 DIAGNOSIS — Z30.9 ENCOUNTER FOR CONTRACEPTIVE MANAGEMENT, UNSPECIFIED TYPE: ICD-10-CM

## 2020-12-21 RX ORDER — LEVONORGESTREL/ETHIN.ESTRADIOL 0.1-0.02MG
1 TABLET ORAL DAILY
Qty: 84 TABLET | Refills: 0 | Status: CANCELLED | OUTPATIENT
Start: 2020-12-21

## 2020-12-21 NOTE — LETTER
[facility]  [address] [id number] [phone]            Faith Hernández  [address]  [place] [address]        December 22, 2020    Dear Faith Hernández,    While refilling your prescription today, we noticed that you are due for an appointment with your provider.  We will refill your prescription for 30 days, but a follow-up appointment must be made before any additional refills can be approved.     Taking care of your health is important to us and we look forward to seeing you in the near future.  Please call us at [phone] or [phone] (or use Appography) to schedule an appointment.     Please disregard this notice if you have already made an appointment.    Sincerely,        [facility]

## 2020-12-22 DIAGNOSIS — E03.9 HYPOTHYROIDISM, UNSPECIFIED TYPE: ICD-10-CM

## 2020-12-22 RX ORDER — LEVOTHYROXINE SODIUM 137 UG/1
TABLET ORAL
Qty: 90 TABLET | Refills: 0 | Status: SHIPPED | OUTPATIENT
Start: 2020-12-22 | End: 2021-03-26 | Stop reason: DRUGHIGH

## 2020-12-22 RX ORDER — LEVONORGESTREL AND ETHINYL ESTRADIOL 0.1-0.02MG
KIT ORAL
Qty: 84 TABLET | Refills: 0 | Status: SHIPPED | OUTPATIENT
Start: 2020-12-22 | End: 2021-03-16

## 2020-12-22 NOTE — LETTER
[facility]  [address] [id number] [phone]            Faith Hernández  [address]  [place] [address]        December 22, 2020    Dear Faith Hernández,    While refilling your prescription today, we noticed that you are due for an appointment with your provider.  We will refill your prescription for 30 days, but a follow-up appointment must be made before any additional refills can be approved.     Taking care of your health is important to us and we look forward to seeing you in the near future.  Please call us at [phone] or [phone] (or use M/A-COM) to schedule an appointment.     Please disregard this notice if you have already made an appointment.    Sincerely,        [facility]

## 2021-01-15 ENCOUNTER — HEALTH MAINTENANCE LETTER (OUTPATIENT)
Age: 36
End: 2021-01-15

## 2021-01-15 ENCOUNTER — OFFICE VISIT (OUTPATIENT)
Dept: INTERNAL MEDICINE | Facility: CLINIC | Age: 36
End: 2021-01-15
Payer: COMMERCIAL

## 2021-01-15 VITALS
HEIGHT: 62 IN | OXYGEN SATURATION: 97 % | HEART RATE: 65 BPM | DIASTOLIC BLOOD PRESSURE: 84 MMHG | BODY MASS INDEX: 30.91 KG/M2 | WEIGHT: 168 LBS | SYSTOLIC BLOOD PRESSURE: 126 MMHG | TEMPERATURE: 97.2 F

## 2021-01-15 DIAGNOSIS — Z78.9 USES BIRTH CONTROL: ICD-10-CM

## 2021-01-15 DIAGNOSIS — Z12.4 SCREENING FOR MALIGNANT NEOPLASM OF CERVIX: ICD-10-CM

## 2021-01-15 DIAGNOSIS — Z13.6 CARDIOVASCULAR SCREENING; LDL GOAL LESS THAN 100: ICD-10-CM

## 2021-01-15 DIAGNOSIS — E03.9 HYPOTHYROIDISM, UNSPECIFIED TYPE: ICD-10-CM

## 2021-01-15 DIAGNOSIS — Z13.1 SCREENING FOR DIABETES MELLITUS: ICD-10-CM

## 2021-01-15 DIAGNOSIS — N64.4 BREAST TENDERNESS IN FEMALE: ICD-10-CM

## 2021-01-15 DIAGNOSIS — Z00.00 ENCOUNTER FOR PREVENTATIVE ADULT HEALTH CARE EXAMINATION: Primary | ICD-10-CM

## 2021-01-15 PROCEDURE — 99213 OFFICE O/P EST LOW 20 MIN: CPT | Mod: 25 | Performed by: INTERNAL MEDICINE

## 2021-01-15 PROCEDURE — G0145 SCR C/V CYTO,THINLAYER,RESCR: HCPCS | Performed by: INTERNAL MEDICINE

## 2021-01-15 PROCEDURE — 99395 PREV VISIT EST AGE 18-39: CPT | Performed by: INTERNAL MEDICINE

## 2021-01-15 PROCEDURE — 87624 HPV HI-RISK TYP POOLED RSLT: CPT | Performed by: INTERNAL MEDICINE

## 2021-01-15 ASSESSMENT — MIFFLIN-ST. JEOR: SCORE: 1405.29

## 2021-01-15 NOTE — PROGRESS NOTES
SUBJECTIVE:   CC: Faith Hernández is an 35 year old woman who presents for preventive health visit and follow-up regarding hypothyroidism and birth control use and discussion regarding hair loss/breast sensitivity.       Patient has been advised of split billing requirements and indicates understanding: Yes  Healthy Habits:     Getting at least 3 servings of Calcium per day:  Yes    Bi-annual eye exam:  Yes    Dental care twice a year:  Yes    Sleep apnea or symptoms of sleep apnea:  None    Diet:  Regular (no restrictions)    Frequency of exercise:  1 day/week    Duration of exercise:  15-30 minutes    Taking medications regularly:  Yes    Medication side effects:  None    PHQ-2 Total Score: 1    Additional concerns today:  Yes    Would like thyroid check - hair is falling out.     Sensitive breasts after changing birth control    Would like to talk about preconception - thinking about having a baby in about a year.          Today's PHQ-2 Score:   PHQ-2 ( 1999 Pfizer) 1/15/2021   Q1: Little interest or pleasure in doing things 1   Q2: Feeling down, depressed or hopeless 0   PHQ-2 Score 1   Q1: Little interest or pleasure in doing things Several days   Q2: Feeling down, depressed or hopeless Not at all   PHQ-2 Score 1       Abuse: Current or Past (Physical, Sexual or Emotional) - No  Do you feel safe in your environment? Yes        Social History     Tobacco Use     Smoking status: Never Smoker     Smokeless tobacco: Never Used   Substance Use Topics     Alcohol use: Yes     Comment: 6-8 weekly     If you drink alcohol do you typically have >3 drinks per day or >7 drinks per week? Yes      Alcohol Use 1/15/2021   Prescreen: >3 drinks/day or >7 drinks/week? Yes   Prescreen: >3 drinks/day or >7 drinks/week? -   AUDIT SCORE  7       Reviewed orders with patient.  Reviewed health maintenance and updated orders accordingly - Yes  Labs reviewed in EPIC    Mammogram not appropriate for this patient based on  age.    Pertinent mammograms are reviewed under the imaging tab.  History of abnormal Pap smear: NO - age 30-65 PAP every 5 years with negative HPV co-testing recommended  PAP / HPV Latest Ref Rng & Units 10/21/2016 6/14/2013   PAP - NIL NIL   HPV 16 DNA NEG Negative -   HPV 18 DNA NEG Negative -   OTHER HR HPV NEG Negative -     Reviewed and updated as needed this visit by clinical staff                 Reviewed and updated as needed this visit by Provider                    Review of Systems  CONSTITUTIONAL: NEGATIVE for fever, chills. Weight up 5 poundss  INTEGUMENTARU/SKIN: NEGATIVE for worrisome rashes, moles or lesions.  Patient states she has noticed a little more hair in her shower drain after washing her hair in the morning.  Denies scalp itching or pain.  EYES: NEGATIVE for vision changes or irritation. Eye exam in past 2 years  ENT: NEGATIVE for ear, mouth and throat problems  RESP: NEGATIVE for significant cough or SOB  BREAST: NEGATIVE for masses,  or discharge. Slight tenderness bilateral breasts for the past 3 weeks.  No localizing pain.  Patient denies feeling any lumps in her breasts.  New generic form of same OCP from  rather than previous [facility] pharmacy started about 5-6 weeks ago.   CV: NEGATIVE for chest pain, palpitations or peripheral edema  GI: NEGATIVE for nausea, abdominal pain, heartburn, or change in bowel habits  : NEGATIVE for unusual urinary or vaginal symptoms. Periods are regular with OCP. LMP a couple weeks ago  MUSCULOSKELETAL: NEGATIVE for significant arthralgias or myalgia  NEURO: NEGATIVE for weakness, dizziness or paresthesias  PSYCHIATRIC: NEGATIVE for changes in mood or affect overall. Stress  sith work and pursuing ALE at same time but feels she is handling overall. Not able to socialize as much with covid restrictions and that frustrates pt some. Lives with boyfriend   ENDO: On thyroid replacement     OBJECTIVE:   /84   Pulse 65   Temp 97.2  F (36.2  C)  "(Temporal)   Ht 1.575 m (5' 2\")   Wt 76.2 kg (168 lb)   SpO2 97%   BMI 30.73 kg/m    Physical Exam  General appearance - healthy, alert, no distress  Skin - No rashes or lesions.  Hair appears thick.  No rash of the scalp.  No alopecia patches  Head - normocephalic, atraumatic  Eyes - PEACE, EOMI, fundi exam with nondilated pupils negative.  Ears - External ears normal. Canals clear. TM's normal.  Nose/Sinuses - Nares normal. Septum midline. Mucosa normal. No drainage or sinus tenderness.  Oropharynx - No erythema, no adenopathy, no exudates.  Neck - Supple without adenopathy or thyromegaly. No bruits.  Lungs - Clear to auscultation without wheezes/rhonchi.  Heart - Regular rate and rhythm without murmurs, clicks, or gallops.  Nodes - No supraclavicular, axillary, or inguinal adenopathy palpable.  Breasts - No masses  palpable bilaterally. No nipple discharge to palpation.  Minimal general tenderness to breast bilaterally with deep palpation.  No localization  Abdomen - Abdomen mildly obese, soft, non-tender. BS normal. No masses or hepatosplenomegaly palpable. No bruits.  Extremities -No cyanosis, clubbing or edema.    Musculoskeletal - Spine ROM normal. Muscular strength intact.   Peripheral pulses - radial=4/4, femoral=4/4, posterior tibial=4/4, dorsalis pedis=4/4,  Neuro - Gait normal. Reflexes normal and symmetric. Sensation grossly WNL.  Genital - Normal-appearing female external genitalia. No cervical erythema or discharge. No cervical motion tenderness. Pap smear obtained in the usual fashion under the witness of my nurse without complication. Bimanual examination revealed the uterus to be normal in size without masses palpable. No adnexal masses or tenderness appreciated.  Rectal - Guaic negative stool. Normal tone. No posterior vaginal or rectal masses palpable.        ASSESSMENT/PLAN:   1. Encounter for preventative adult health care examination  Patient declines flu vaccination.  Other vaccinations " up-to-date.  Pap smear performed as below.  Counseled regarding diet and exercise with weight gain    2. Hypothyroidism, unspecified type  Mild weight gain though patient has not been exercising or as active as usual and eating a little more per her report.  Good energy overall.  Notices some hair loss.  Given these findings, will recheck thyroid function to be sure patient is not slid back into a hypothyroid state.  Continue current dose of levothyroxine pending lab results  - TSH with free T4 reflex; Future    3. Breast tenderness in female  Symptoms not present until about 3 weeks ago.  Recently started new generic form of same birth control.  Possibly related to this versus other.  Patient will monitor symptoms over the next menstrual cycle.  If continues, patient to change back to previous generic maker of her birth control.  No masses palpable on breast exam and symptoms bilateral so do not feel mammography necessary at this time.  If symptoms persist however, will consider imaging with mammography and ultrasound    4. Uses birth control  Patient thinking about becoming pregnant sometime in the future but not currently.  We will therefore continue birth control.  Explained to patient that she would need to be off of birth control likely for 3 months prior to starting time when she wishes to try to get pregnant and patient should then also start taking a prenatal vitamin daily.  Non-smoker    5. Screening for malignant neoplasm of cervix  - Pap imaged thin layer screen with HPV - recommended age 30 - 65 years (select HPV order below)  - HPV High Risk Types DNA Cervical    6. Screening for diabetes mellitus  - Basic metabolic panel; Future    7. CARDIOVASCULAR SCREENING; LDL GOAL LESS THAN 100  - Lipid panel reflex to direct LDL Fasting; Future        Patient has been advised of split billing requirements and indicates understanding: Yes  COUNSELING:  Reviewed preventive health counseling, as reflected in  "patient instructions    Estimated body mass index is 29.81 kg/m  as calculated from the following:    Height as of 11/15/19: 1.575 m (5' 2\").    Weight as of 11/15/19: 73.9 kg (163 lb).    Weight management plan: Discussed healthy diet and exercise guidelines    She reports that she has never smoked. She has never used smokeless tobacco.      Counseling Resources:  ATP IV Guidelines  Pooled Cohorts Equation Calculator  Breast Cancer Risk Calculator  BRCA-Related Cancer Risk Assessment: FHS-7 Tool  FRAX Risk Assessment  ICSI Preventive Guidelines  Dietary Guidelines for Americans, 2010  USDA's MyPlate  ASA Prophylaxis  Lung CA Screening      PLAN:  Continue current medications for now pending lab results  Call  [phone] or use [employer] to schedule a future lab appointment  fasting in 1-2 weeks.   For fasting labs, please refrain from eating for 8 hours or more.   Drink 2 glasses of water before your lab appointment. It is fine to take your  oral medications on the morning of the lab test as usual  Reduce calorie/carbohydrate (sugar, bread, potato, pasta, rice, alcohol etc)  intake in diet.  Increase color on your plate with fruits and vegetables. Increase  frequency of walking or other aerobic exercise as able (goal is daily)  Pt declines flu vaccine  Monitor breast tenderness.   If continues, then would suggest getting same birth control pill refilled through [facility] pharmacy rather than [facility] to see if going back to the previous generic maker makes a difference.  If symptoms persist despite this, then let me know through a [employer] message   Pt was informed regarding extra E&M billing for management of new or established medical issues not related to today's wellness visit      Michael Arenas MD  [facility]  "

## 2021-01-17 NOTE — PATIENT INSTRUCTIONS
Continue current medications for now pending lab results  Call  [phone] or use Offerum to schedule a future lab appointment  fasting in 1-2 weeks.   For fasting labs, please refrain from eating for 8 hours or more.   Drink 2 glasses of water before your lab appointment. It is fine to take your  oral medications on the morning of the lab test as usual  Reduce calorie/carbohydrate (sugar, bread, potato, pasta, rice, alcohol etc)  intake in diet.  Increase color on your plate with fruits and vegetables. Increase  frequency of walking or other aerobic exercise as able (goal is daily)  Pt declines flu vaccine  Monitor breast tenderness.  If continues, then would suggest getting same birth control pill refilled through [facility] rather than [facility] to see if going back to the previous generic maker makes a difference.  If symptoms persist despite this, then let me know through a [employer] message   Pt was informed regarding extra E&M billing for management of new or established medical issues not related to today's wellness visit

## 2021-01-19 LAB
COPATH REPORT: NORMAL
PAP: NORMAL

## 2021-01-21 LAB
FINAL DIAGNOSIS: NORMAL
HPV HR 12 DNA CVX QL NAA+PROBE: NEGATIVE
HPV16 DNA SPEC QL NAA+PROBE: NEGATIVE
HPV18 DNA SPEC QL NAA+PROBE: NEGATIVE
SPECIMEN DESCRIPTION: NORMAL
SPECIMEN SOURCE CVX/VAG CYTO: NORMAL

## 2021-01-26 DIAGNOSIS — Z13.1 SCREENING FOR DIABETES MELLITUS: ICD-10-CM

## 2021-01-26 DIAGNOSIS — Z13.6 CARDIOVASCULAR SCREENING; LDL GOAL LESS THAN 100: ICD-10-CM

## 2021-01-26 DIAGNOSIS — E03.9 HYPOTHYROIDISM, UNSPECIFIED TYPE: ICD-10-CM

## 2021-01-26 LAB
ANION GAP SERPL CALCULATED.3IONS-SCNC: 5 MMOL/L (ref 3–14)
BUN SERPL-MCNC: 9 MG/DL (ref 7–30)
CALCIUM SERPL-MCNC: 9.3 MG/DL (ref 8.5–10.1)
CHLORIDE SERPL-SCNC: 109 MMOL/L (ref 94–109)
CHOLEST SERPL-MCNC: 245 MG/DL
CO2 SERPL-SCNC: 23 MMOL/L (ref 20–32)
CREAT SERPL-MCNC: 0.62 MG/DL (ref 0.52–1.04)
GFR SERPL CREATININE-BSD FRML MDRD: >90 ML/MIN/{1.73_M2}
GLUCOSE SERPL-MCNC: 95 MG/DL (ref 70–99)
HDLC SERPL-MCNC: 59 MG/DL
LDLC SERPL CALC-MCNC: 159 MG/DL
NONHDLC SERPL-MCNC: 186 MG/DL
POTASSIUM SERPL-SCNC: 4 MMOL/L (ref 3.4–5.3)
SODIUM SERPL-SCNC: 137 MMOL/L (ref 133–144)
T4 FREE SERPL-MCNC: 1.15 NG/DL (ref 0.76–1.46)
TRIGL SERPL-MCNC: 136 MG/DL
TSH SERPL DL<=0.005 MIU/L-ACNC: 4.84 MU/L (ref 0.4–4)

## 2021-01-26 PROCEDURE — 80048 BASIC METABOLIC PNL TOTAL CA: CPT | Performed by: INTERNAL MEDICINE

## 2021-01-26 PROCEDURE — 84443 ASSAY THYROID STIM HORMONE: CPT | Performed by: INTERNAL MEDICINE

## 2021-01-26 PROCEDURE — 84439 ASSAY OF FREE THYROXINE: CPT | Performed by: INTERNAL MEDICINE

## 2021-01-26 PROCEDURE — 80061 LIPID PANEL: CPT | Performed by: INTERNAL MEDICINE

## 2021-01-26 PROCEDURE — 36415 COLL VENOUS BLD VENIPUNCTURE: CPT | Performed by: INTERNAL MEDICINE

## 2021-03-16 ENCOUNTER — MYC REFILL (OUTPATIENT)
Dept: INTERNAL MEDICINE | Facility: CLINIC | Age: 36
End: 2021-03-16

## 2021-03-16 DIAGNOSIS — Z30.9 ENCOUNTER FOR CONTRACEPTIVE MANAGEMENT, UNSPECIFIED TYPE: ICD-10-CM

## 2021-03-16 DIAGNOSIS — E03.9 HYPOTHYROIDISM, UNSPECIFIED TYPE: ICD-10-CM

## 2021-03-16 RX ORDER — LEVONORGESTREL AND ETHINYL ESTRADIOL 0.1-0.02MG
KIT ORAL
Qty: 84 TABLET | Refills: 0 | Status: SHIPPED | OUTPATIENT
Start: 2021-03-16 | End: 2021-03-26

## 2021-03-19 RX ORDER — LEVONORGESTREL/ETHIN.ESTRADIOL 0.1-0.02MG
1 TABLET ORAL DAILY
Qty: 84 TABLET | Refills: 0 | OUTPATIENT
Start: 2021-03-19

## 2021-03-19 NOTE — TELEPHONE ENCOUNTER
Routing refill request to provider for review/approval because:  Labs out of range:  1/26/21  TSH :   4.84     Free T4:  1.15    30 days pended.  Do you want to change dose or have labs repeated?  Lastt OV 1/15/21    Thanks!    Erna Cota, MSN, RN  Triage RN  [facility]

## 2021-03-22 DIAGNOSIS — Z13.6 CARDIOVASCULAR SCREENING; LDL GOAL LESS THAN 160: Primary | ICD-10-CM

## 2021-03-22 DIAGNOSIS — E03.9 HYPOTHYROIDISM, UNSPECIFIED TYPE: ICD-10-CM

## 2021-03-23 NOTE — CONFIDENTIAL NOTE
Routing refill request to provider for review/approval because:  Labs out of range:  TSH   4.84

## 2021-03-26 ENCOUNTER — MYC REFILL (OUTPATIENT)
Dept: INTERNAL MEDICINE | Facility: CLINIC | Age: 36
End: 2021-03-26

## 2021-03-26 DIAGNOSIS — E03.9 HYPOTHYROIDISM, UNSPECIFIED TYPE: ICD-10-CM

## 2021-03-26 DIAGNOSIS — Z30.9 ENCOUNTER FOR CONTRACEPTIVE MANAGEMENT, UNSPECIFIED TYPE: ICD-10-CM

## 2021-03-26 RX ORDER — LEVONORGESTREL/ETHIN.ESTRADIOL 0.1-0.02MG
1 TABLET ORAL DAILY
Qty: 84 TABLET | Refills: 3 | Status: SHIPPED | OUTPATIENT
Start: 2021-03-26 | End: 2022-02-24

## 2021-03-26 RX ORDER — LEVOTHYROXINE SODIUM 137 UG/1
137 TABLET ORAL DAILY
Qty: 90 TABLET | Refills: 2 | OUTPATIENT
Start: 2021-03-26

## 2021-03-26 RX ORDER — LEVOTHYROXINE SODIUM 150 UG/1
150 TABLET ORAL DAILY
Qty: 90 TABLET | Refills: 3 | Status: SHIPPED | OUTPATIENT
Start: 2021-03-26 | End: 2022-03-28

## 2021-03-26 RX ORDER — LEVOTHYROXINE SODIUM 150 UG/1
150 TABLET ORAL DAILY
Qty: 90 TABLET | Refills: 3 | Status: SHIPPED | OUTPATIENT
Start: 2021-03-26 | End: 2021-03-26

## 2021-03-26 RX ORDER — LEVOTHYROXINE SODIUM 137 UG/1
TABLET ORAL
Qty: 90 TABLET | Refills: 0 | OUTPATIENT
Start: 2021-03-26

## 2021-03-26 NOTE — TELEPHONE ENCOUNTER
Tried to call pt. NA.  Pt has CTC by phone. LM on phone  That needs slightly higher  Levothyroxine dosage and pt to stop 137mcg tab and start 150mcg tab,1 tab daily for thyroid function and to repeat fasting labs in 6 weeks to recheck TSH and recent elevated lipids. Rx faxed

## 2021-03-26 NOTE — TELEPHONE ENCOUNTER
Rxs sent to  pharmacy as requested and MD called [facility] to [facility] the RFs that had been sent there. Spoke with pt and updated her also. WIll schedule labs in 6 weeks

## 2021-07-20 NOTE — TELEPHONE ENCOUNTER
Pt is calling in to get her Covid 19 results for a test that was done on 10/13/2020. Negative results were given to patient. Pt verbalized understanding.    Iglesia Paul, RN [facility] Triage/Medication Refill

## 2022-02-26 ENCOUNTER — HEALTH MAINTENANCE LETTER (OUTPATIENT)
Age: 37
End: 2022-02-26

## 2022-03-26 DIAGNOSIS — E03.9 HYPOTHYROIDISM, UNSPECIFIED TYPE: ICD-10-CM

## 2022-03-28 ENCOUNTER — OFFICE VISIT (OUTPATIENT)
Dept: INTERNAL MEDICINE | Facility: CLINIC | Age: 37
End: 2022-03-28
Payer: COMMERCIAL

## 2022-03-28 VITALS
DIASTOLIC BLOOD PRESSURE: 84 MMHG | TEMPERATURE: 97.8 F | SYSTOLIC BLOOD PRESSURE: 124 MMHG | HEART RATE: 97 BPM | OXYGEN SATURATION: 100 % | BODY MASS INDEX: 30.65 KG/M2 | WEIGHT: 167.6 LBS

## 2022-03-28 DIAGNOSIS — Z30.9 ENCOUNTER FOR CONTRACEPTIVE MANAGEMENT, UNSPECIFIED TYPE: ICD-10-CM

## 2022-03-28 DIAGNOSIS — Z00.00 ROUTINE GENERAL MEDICAL EXAMINATION AT A HEALTH CARE FACILITY: Primary | ICD-10-CM

## 2022-03-28 DIAGNOSIS — E03.9 HYPOTHYROIDISM, UNSPECIFIED TYPE: ICD-10-CM

## 2022-03-28 DIAGNOSIS — J02.9 SORE THROAT: ICD-10-CM

## 2022-03-28 LAB
ALBUMIN SERPL-MCNC: 3.6 G/DL (ref 3.4–5)
ALP SERPL-CCNC: 108 U/L (ref 40–150)
ALT SERPL W P-5'-P-CCNC: 31 U/L (ref 0–50)
ANION GAP SERPL CALCULATED.3IONS-SCNC: 5 MMOL/L (ref 3–14)
AST SERPL W P-5'-P-CCNC: 18 U/L (ref 0–45)
BILIRUB SERPL-MCNC: 1 MG/DL (ref 0.2–1.3)
BUN SERPL-MCNC: 8 MG/DL (ref 7–30)
CALCIUM SERPL-MCNC: 9.1 MG/DL (ref 8.5–10.1)
CHLORIDE BLD-SCNC: 108 MMOL/L (ref 94–109)
CHOLEST SERPL-MCNC: 213 MG/DL
CO2 SERPL-SCNC: 25 MMOL/L (ref 20–32)
CREAT SERPL-MCNC: 0.7 MG/DL (ref 0.52–1.04)
DEPRECATED S PYO AG THROAT QL EIA: NEGATIVE
ERYTHROCYTE [DISTWIDTH] IN BLOOD BY AUTOMATED COUNT: 13.2 % (ref 10–15)
FASTING STATUS PATIENT QL REPORTED: YES
GFR SERPL CREATININE-BSD FRML MDRD: >90 ML/MIN/1.73M2
GLUCOSE BLD-MCNC: 101 MG/DL (ref 70–99)
GROUP A STREP BY PCR: NOT DETECTED
HCT VFR BLD AUTO: 41.4 % (ref 35–47)
HDLC SERPL-MCNC: 54 MG/DL
HGB BLD-MCNC: 13 G/DL (ref 11.7–15.7)
LDLC SERPL CALC-MCNC: 122 MG/DL
MCH RBC QN AUTO: 26.8 PG (ref 26.5–33)
MCHC RBC AUTO-ENTMCNC: 31.4 G/DL (ref 31.5–36.5)
MCV RBC AUTO: 85 FL (ref 78–100)
NONHDLC SERPL-MCNC: 159 MG/DL
PLATELET # BLD AUTO: 360 10E3/UL (ref 150–450)
POTASSIUM BLD-SCNC: 4.4 MMOL/L (ref 3.4–5.3)
PROT SERPL-MCNC: 7 G/DL (ref 6.8–8.8)
RBC # BLD AUTO: 4.85 10E6/UL (ref 3.8–5.2)
SODIUM SERPL-SCNC: 138 MMOL/L (ref 133–144)
TRIGL SERPL-MCNC: 186 MG/DL
TSH SERPL DL<=0.005 MIU/L-ACNC: 1.65 MU/L (ref 0.4–4)
WBC # BLD AUTO: 11.7 10E3/UL (ref 4–11)

## 2022-03-28 PROCEDURE — 36415 COLL VENOUS BLD VENIPUNCTURE: CPT | Performed by: INTERNAL MEDICINE

## 2022-03-28 PROCEDURE — 87651 STREP A DNA AMP PROBE: CPT | Performed by: INTERNAL MEDICINE

## 2022-03-28 PROCEDURE — 85027 COMPLETE CBC AUTOMATED: CPT | Performed by: INTERNAL MEDICINE

## 2022-03-28 PROCEDURE — 80061 LIPID PANEL: CPT | Performed by: INTERNAL MEDICINE

## 2022-03-28 PROCEDURE — 80053 COMPREHEN METABOLIC PANEL: CPT | Performed by: INTERNAL MEDICINE

## 2022-03-28 PROCEDURE — 99395 PREV VISIT EST AGE 18-39: CPT | Performed by: INTERNAL MEDICINE

## 2022-03-28 PROCEDURE — 99214 OFFICE O/P EST MOD 30 MIN: CPT | Mod: 25 | Performed by: INTERNAL MEDICINE

## 2022-03-28 PROCEDURE — 84443 ASSAY THYROID STIM HORMONE: CPT | Performed by: INTERNAL MEDICINE

## 2022-03-28 RX ORDER — LEVONORGESTREL/ETHIN.ESTRADIOL 0.1-0.02MG
1 TABLET ORAL DAILY
Qty: 84 TABLET | Refills: 3 | Status: SHIPPED | OUTPATIENT
Start: 2022-03-28 | End: 2022-05-06

## 2022-03-28 RX ORDER — LEVOTHYROXINE SODIUM 150 UG/1
150 TABLET ORAL DAILY
Qty: 90 TABLET | Refills: 3 | Status: SHIPPED | OUTPATIENT
Start: 2022-03-28 | End: 2022-05-06

## 2022-03-28 RX ORDER — LEVOTHYROXINE SODIUM 150 UG/1
TABLET ORAL
Qty: 90 TABLET | Refills: 0 | Status: SHIPPED | OUTPATIENT
Start: 2022-03-28 | End: 2022-03-28

## 2022-03-28 ASSESSMENT — ENCOUNTER SYMPTOMS
DYSURIA: 0
NERVOUS/ANXIOUS: 0
HEMATURIA: 0
DIZZINESS: 0
SORE THROAT: 1
HEARTBURN: 0
WEAKNESS: 0
MYALGIAS: 0
JOINT SWELLING: 0
PALPITATIONS: 0
EYE PAIN: 0
ABDOMINAL PAIN: 0
FEVER: 0
FREQUENCY: 0
CHILLS: 0
HEADACHES: 0
COUGH: 0
SHORTNESS OF BREATH: 0
NAUSEA: 0
DIARRHEA: 0
BREAST MASS: 0
HEMATOCHEZIA: 0
PARESTHESIAS: 0
ARTHRALGIAS: 0
CONSTIPATION: 0

## 2022-03-28 NOTE — PROGRESS NOTES
SUBJECTIVE:   CC: Faith Hernández is an 36 year old woman who presents for preventive health visit, follow-up hypothyroidism and evaluation of sore throat        Healthy Habits:     Getting at least 3 servings of Calcium per day:  Yes    Bi-annual eye exam:  Yes    Dental care twice a year:  Yes    Sleep apnea or symptoms of sleep apnea:  None    Diet:  Regular (no restrictions)    Frequency of exercise:  1 day/week    Duration of exercise:  30-45 minutes    Taking medications regularly:  Yes    Medication side effects:  None    PHQ-2 Total Score: 0    Additional concerns today:  Yes      Getting  in October. Will be honeymooning in [place] after that. Hep A vaccine UTD. Due for tetanus booster        Today's PHQ-2 Score:   PHQ-2 ( 1999 Pfizer) 3/28/2022   Q1: Little interest or pleasure in doing things 0   Q2: Feeling down, depressed or hopeless 0   PHQ-2 Score 0   PHQ-2 Total Score (12-17 Years)- Positive if 3 or more points; Administer PHQ-A if positive -   Q1: Little interest or pleasure in doing things Not at all   Q2: Feeling down, depressed or hopeless Not at all   PHQ-2 Score 0       Abuse: Current or Past (Physical, Sexual or Emotional) - No  Do you feel safe in your environment? Yes    Have you ever done Advance Care Planning? (For example, a Health Directive, POLST, or a discussion with a medical provider or your loved ones about your wishes): No, advance care planning information given to patient to review.  Advanced care planning was discussed at today's visit.    Social History     Tobacco Use     Smoking status: Never Smoker     Smokeless tobacco: Never Used   Substance Use Topics     Alcohol use: Yes     Comment: 6-8 weekly         Alcohol Use 3/28/2022   Prescreen: >3 drinks/day or >7 drinks/week? Yes   Prescreen: >3 drinks/day or >7 drinks/week? -   AUDIT SCORE  6     AUDIT - Alcohol Use Disorders Identification Test - Reproduced from the World Health Organization Audit 2001 (Second  Edition) 3/28/2022   1.  How often do you have a drink containing alcohol? 4 or more times a week   2.  How many drinks containing alcohol do you have on a typical day when you are drinking? 1 or 2   3.  How often do you have five or more drinks on one occasion? Monthly   4.  How often during the last year have you found that you were not able to stop drinking once you had started? Never   5.  How often during the last year have you failed to do what was normally expected of you because of drinking? Never   6.  How often during the last year have you needed a first drink in the morning to get yourself going after a heavy drinking session? Never   7.  How often during the last year have you had a feeling of guilt or remorse after drinking? Never   8.  How often during the last year have you been unable to remember what happened the night before because of your drinking? Never   9.  Have you or someone else been injured because of your drinking? No   10. Has a relative, friend, doctor or other health care worker been concerned about your drinking or suggested you cut down? No   TOTAL SCORE 6       Reviewed orders with patient.  Reviewed health maintenance and updated orders accordingly - Yes  Lab work is in process    Breast Cancer Screening:    Breast CA Risk Assessment (FHS-7) 3/28/2022   Do you have a family history of breast, colon, or ovarian cancer? No / Unknown         Patient under 40 years of age: Routine Mammogram Screening not recommended.   Pertinent mammograms are reviewed under the imaging tab.    History of abnormal Pap smear: NO - age 30-65 PAP every 5 years with negative HPV co-testing recommended  PAP / HPV Latest Ref Rng & Units 1/15/2021 10/21/2016 6/14/2013   PAP (Historical) - NIL NIL NIL   HPV16 NEG:Negative Negative Negative -   HPV18 NEG:Negative Negative Negative -   HRHPV NEG:Negative Negative Negative -     Reviewed and updated as needed this visit by clinical staff                   Reviewed and updated as needed this visit by Provider                     Review of Systems  CONSTITUTIONAL: NEGATIVE for fever, chills. Weight down 1 pound  INTEGUMENTARU/SKIN: NEGATIVE for worrisome rashes, moles or lesions  EYES: NEGATIVE for vision changes or irritation.  Due for eye exam  ENT: NEGATIVE for ear, mouth. Sore throat  right side started today  RESP: NEGATIVE for significant cough or SOB  BREAST: NEGATIVE for masses, tenderness or discharge  CV: NEGATIVE for chest pain, palpitations or peripheral edema  GI: NEGATIVE for nausea, abdominal pain, heartburn, or change in bowel habits  : NEGATIVE for unusual urinary or vaginal symptoms. Periods are regular. Pap UTD  MUSCULOSKELETAL: NEGATIVE for significant arthralgias or myalgia  NEURO: NEGATIVE for weakness, dizziness or paresthesias  PSYCHIATRIC: NEGATIVE for changes in mood or affect   ENDO : On Levothyroxine. Due for lab f/u      OBJECTIVE:   /84   Pulse 97   Temp 97.8  F (36.6  C) (Oral)   Wt 167 lb 9.6 oz (76 kg)   SpO2 100%   BMI 30.65 kg/m    Physical Exam  General appearance -   alert, no distress  Skin - No rashes or lesions.  Head - normocephalic, atraumatic  Eyes - PEACE, EOMI, fundi exam with nondilated pupils negative.  Ears - External ears normal. Canals clear. TM's normal.  Nose/Sinuses - Nares normal. Septum midline. Mucosa normal. No drainage or sinus tenderness.  Oropharynx - No erythema, no adenopathy, no exudates.  Minimal posterior pharyngeal erythema  Neck - Supple without adenopathy or thyromegaly. No bruits.  Lungs - Clear to auscultation without wheezes/rhonchi.  Heart - Regular rate and rhythm without murmurs, clicks, or gallops.  Nodes - No supraclavicular, axillary, or inguinal adenopathy palpable.  Breasts - deferred  Abdomen - Abdomen mildly obese, soft, non-tender. BS normal. No masses or hepatosplenomegaly palpable. No bruits.  Extremities -No cyanosis, clubbing or edema.    Musculoskeletal - Spine ROM  normal. Muscular strength intact.   Peripheral pulses - radial=4/4, femoral=4/4, posterior tibial=4/4, dorsalis pedis=4/4,  Neuro - Gait normal. Reflexes normal and symmetric. Sensation grossly WNL.  Genital/Rectal - deferred    ASSESSMENT/PLAN:   1. Routine general medical examination at a health care facility  Screening labs as ordered.  Due for tetanus booster but will defer immunization until throat symptoms feeling better.  Pap smear up-to-date.  Counseled regarding calorie/carbohydrate reduction along with increase in exercise for weight loss  - Comprehensive metabolic panel; Future  - Lipid panel reflex to direct LDL Fasting; Future  - CBC with platelets; Future  - Comprehensive metabolic panel  - Lipid panel reflex to direct LDL Fasting  - CBC with platelets    2. Sore throat  Rapid strep test negative.  Counseled regarding performing a home screening Covid test today.  If positive, will inform physician.  If negative, will then gargle with warm liquids and use Cepacol lozenges/ibuprofen as needed for symptoms  - Streptococcus A Rapid Screen w/Reflex to PCR - Clinic Collect  - Group A Streptococcus PCR Throat Swab    3. Hypothyroidism, unspecified type  Previous TSH mildly elevated.  Levothyroxine dose increased since that time and due for follow-up labs.  Continue current levothyroxine dosage pending lab results  - TSH with free T4 reflex; Future  - TSH with free T4 reflex  - levothyroxine (SYNTHROID/LEVOTHROID) 150 MCG tablet; Take 1 tablet (150 mcg) by mouth daily  Dispense: 90 tablet; Refill: 3     4. Encounter for contraceptive management, unspecified type  Pelvic exam UTD.  Non-smoker.  Continue current medication.  Menses regular  - levonorgestrel-ethinyl estradiol (VIENVA) 0.1-20 MG-MCG tablet; Take 1 tablet by mouth daily  Dispense: 84 tablet; Refill: 3      Patient has been advised of split billing requirements and indicates understanding: Yes    COUNSELING:  Reviewed preventive health counseling,  "as reflected in patient instructions    Estimated body mass index is 30.73 kg/m  as calculated from the following:    Height as of 1/15/21: 5' 2\" (1.575 m).    Weight as of 1/15/21: 168 lb (76.2 kg).    Weight management plan: Discussed healthy diet and exercise guidelines    She reports that she has never smoked. She has never used smokeless tobacco.      Counseling Resources:  ATP IV Guidelines  Pooled Cohorts Equation Calculator  Breast Cancer Risk Calculator  BRCA-Related Cancer Risk Assessment: FHS-7 Tool  FRAX Risk Assessment  ICSI Preventive Guidelines  Dietary Guidelines for Americans, 2010  [employer]'s MyPlate  ASA Prophylaxis  Lung CA Screening      PLAN:  Continue current meds  Prescriptions refilled.    Labs today as ordered including Rapid strep test for throat. If test negative, then do home covid test today to be sure   OTC Cepachol lozenges as needed for sore throat. Gargle with warm water  Reduce calorie/carbohydrate (sugar, bread, potato, pasta, rice, alcohol etc)  intake in diet.  Increase color on your plate with fruits and vegetables. Increase  frequency of walking or other aerobic exercise as able (goal is daily)   Specifically limit alcohol to 3x/week or less and 1-2 drinks per sitting  Vaccinations: Tetanus (Td) vaccine through pharmacy once throat feeling better  Pt was informed regarding extra E&M billing for management of new or established medical issues not related to today's wellness visit    [place] Arenas MD  [facility]     "

## 2022-03-28 NOTE — TELEPHONE ENCOUNTER
Prescription approved per [facility] Refill Protocol.  Eveline Liao RN

## 2022-03-28 NOTE — PATIENT INSTRUCTIONS
Continue current meds  Prescriptions refilled.    Labs today as ordered including Rapid strep test for throat. If test negative, then do home covid test today to be sure   OTC Cepachol lozenges as needed for sore throat. Gargle with warm water  Reduce calorie/carbohydrate (sugar, bread, potato, pasta, rice, alcohol etc)  intake in diet.  Increase color on your plate with fruits and vegetables. Increase  frequency of walking or other aerobic exercise as able (goal is daily)   Specifically limit alcohol to 3x/week or less and 1-2 drinks per sitting  Vaccinations: Tetanus (Td) vaccine through pharmacy once throat feeling better  Pt was informed regarding extra E&M billing for management of new or established medical issues not related to today's wellness visit

## 2022-05-04 DIAGNOSIS — E03.9 HYPOTHYROIDISM, UNSPECIFIED TYPE: ICD-10-CM

## 2022-05-04 DIAGNOSIS — Z30.9 ENCOUNTER FOR CONTRACEPTIVE MANAGEMENT, UNSPECIFIED TYPE: ICD-10-CM

## 2022-05-06 RX ORDER — LEVONORGESTREL/ETHIN.ESTRADIOL 0.1-0.02MG
1 TABLET ORAL DAILY
Qty: 84 TABLET | Refills: 2 | Status: SHIPPED | OUTPATIENT
Start: 2022-05-06 | End: 2022-05-23

## 2022-05-06 RX ORDER — LEVOTHYROXINE SODIUM 150 UG/1
150 TABLET ORAL DAILY
Qty: 90 TABLET | Refills: 3 | Status: SHIPPED | OUTPATIENT
Start: 2022-05-06 | End: 2023-06-15

## 2022-05-06 NOTE — TELEPHONE ENCOUNTER
Routing refill request to provider for review/approval because:  Drug not on the [facility] refill protocol     Carlos Burden RN  [facility] Triage Nurse

## 2022-07-26 NOTE — PROGRESS NOTES
SUBJECTIVE:   CC: Faith Hernández is an 33 year old woman who presents for preventive health visit.     Healthy Habits:  Answers for HPI/ROS submitted by the patient on 12/12/2018   Annual Exam:  Frequency of exercise:: 1 day/week  Getting at least 3 servings of Calcium per day:: Yes  Diet:: Regular (no restrictions)  Taking medications regularly:: Yes  Medication side effects:: None  Bi-annual eye exam:: Yes  Dental care twice a year:: Yes  Sleep apnea or symptoms of sleep apnea:: None  Additional concerns today:: Yes  Duration of exercise:: 15-30 minutes          Today's PHQ-2 Score:   PHQ-2 ( 1999 Pfizer) 12/5/2017 10/21/2016   Q1: Little interest or pleasure in doing things 0 0   Q2: Feeling down, depressed or hopeless 0 0   PHQ-2 Score 0 0   Q1: Little interest or pleasure in doing things Not at all Not at all   Q2: Feeling down, depressed or hopeless Not at all Not at all   PHQ-2 Score 0 0       Abuse: Current or Past(Physical, Sexual or Emotional)- No  Do you feel safe in your environment? Yes    Social History     Tobacco Use     Smoking status: Never Smoker     Smokeless tobacco: Never Used   Substance Use Topics     Alcohol use: Yes     Comment: 6-8 weekly     If you drink alcohol do you typically have >3 drinks per day or >7 drinks per week? No                     Reviewed orders with patient.  Reviewed health maintenance and updated orders accordingly - Yes  Labs reviewed in EPIC    Mammogram not appropriate for this patient based on age.    Pertinent mammograms are reviewed under the imaging tab.  History of abnormal Pap smear: NO - age 30-65 PAP every 5 years with negative HPV co-testing recommended  PAP / HPV Latest Ref Rng & Units 10/21/2016 6/14/2013   PAP - NIL NIL   HPV 16 DNA NEG Negative -   HPV 18 DNA NEG Negative -   OTHER HR HPV NEG Negative -     Reviewed and updated as needed this visit by clinical staff         Reviewed and updated as needed this visit by Provider       Attempted to contact Ms. Webster X 2 to address her concerns but there was no answer. A VM was left for her to call the clinic @ [phone] or send a message through the patient portal.     "      ROS:  CONSTITUTIONAL: NEGATIVE for fever, chills . Weight up 4 pounds  INTEGUMENTARU/SKIN: NEGATIVE for worrisome rashes, moles.  Has dry flaking  Skin spots on  outer and bottom of feet and right palm  EYES: NEGATIVE for vision changes or irritation  ENT: NEGATIVE for ear, mouth and throat problems  RESP: NEGATIVE for significant cough or SOB  BREAST: NEGATIVE for masses, tenderness or discharge  CV: NEGATIVE for chest pain, palpitations or peripheral edema  GI: NEGATIVE for nausea, abdominal pain, heartburn, or change in bowel habits  : NEGATIVE for unusual urinary or vaginal symptoms. Periods are regular.  MUSCULOSKELETAL: NEGATIVE for significant arthralgias or myalgia  NEURO: NEGATIVE for weakness, dizziness or paresthesias  PSYCHIATRIC: NEGATIVE for changes in mood or affect    OBJECTIVE:   /82   Pulse 94   Temp 98.5  F (36.9  C) (Oral)   Resp 16   Ht 1.575 m (5' 2\")   Wt 70.3 kg (155 lb)   LMP 11/28/2018   SpO2 100%   BMI 28.35 kg/m    EXAM:  General appearance - healthy, alert, no distress  Skin -papular rash bilateral arches of feet and slightly on the plantar side consistent with tinea.  1 cm patch eczematous dry skin right palm  Head - normocephalic, atraumatic  Eyes - PEACE, EOMI, fundi exam with nondilated pupils negative.  Ears - External ears normal. Canals clear. TM's normal.  Nose/Sinuses - Nares normal. Septum midline. Mucosa normal. No drainage or sinus tenderness.  Oropharynx - No erythema, no adenopathy, no exudates.  Neck - Supple without adenopathy or thyromegaly. No bruits.  Lungs - Clear to auscultation without wheezes/rhonchi.  Heart - Regular rate and rhythm without murmurs, clicks, or gallops.  Nodes - No supraclavicular, axillary, or inguinal adenopathy palpable.  Breasts - deferred  Abdomen - Abdomen soft, non-tender. BS normal. No masses or hepatosplenomegaly palpable. No bruits.  Extremities -No cyanosis, clubbing or edema.    Musculoskeletal - Spine ROM normal. " "Muscular strength intact.   Peripheral pulses - radial=4/4, femoral=4/4, posterior tibial=4/4, dorsalis pedis=4/4,  Neuro - Gait normal. Reflexes normal and symmetric. Sensation grossly WNL.  Genital/Rectal - deferred       ASSESSMENT/PLAN:   1. Encounter for routine adult medical exam with abnormal findings  Patient declines flu vaccination.  Screening labs in the next 1-2 weeks as previously ordered. See plan discussion below regarding diet and exercise counseling.  Pap UTD    2. Screening for HIV (human immunodeficiency virus)   HIV screening recommendation per USPSTF guideline discussed with pt. Pt agrees.   - HIV Screening; Future    3. Dermatitis  Mild dyshidrotic eczema of the hand.  Will treat with topical steroid therapy  - triamcinolone (KENALOG) 0.1 % external cream; Apply topically 2 times daily  Dispense: 45 g; Refill: 0    4. Tinea pedis of both feet  Patient to treat with over-the-counter clotrimazole      COUNSELING:   Reviewed preventive health counseling, as reflected in patient instructions    BP Readings from Last 1 Encounters:   08/12/18 136/86     Estimated body mass index is 26.54 kg/m  as calculated from the following:    Height as of 10/21/16: 1.607 m (5' 3.25\").    Weight as of 8/12/18: 68.5 kg (151 lb).    BP Screening:   Last 3 BP Readings:    BP Readings from Last 3 Encounters:   12/12/18 124/74   08/12/18 136/86   12/05/17 112/76       The following was recommended to the patient:  Re-screen BP within a year and recommended lifestyle modifications  Weight management plan: Discussed healthy diet and exercise guidelines     reports that  has never smoked. she has never used smokeless tobacco.      Counseling Resources:  ATP IV Guidelines  Pooled Cohorts Equation Calculator  Breast Cancer Risk Calculator  FRAX Risk Assessment  ICSI Preventive Guidelines  Dietary Guidelines for Americans, 2010  USDA's MyPlate  ASA Prophylaxis  Lung CA Screening      PLAN:  Eye exam in the next year   Pt " declines flu vaccine  For foot, get OTC Clotrimazole/Lotrimin antifungal  cream and apply to foot skin rash issues twice a day for 1 week. Then if persists, then use triamcinolone steroid cream twice a day until resolves   May use steroid cream for palm hand at night and use Vanicream or Lubriderm in daytime for dry skin   Fasting labs in the next couple weeks  Calorie/carbohydrate (sugar, bread, potato, pasta, etc) reduction in diet.   Increase color on your plate with fruits and vegetables. Increase  frequency of walking or other aerobic exercise as able (goal is daily)      [place] Arenas MD  [facility]

## 2022-12-28 ENCOUNTER — MYC MEDICAL ADVICE (OUTPATIENT)
Dept: INTERNAL MEDICINE | Facility: CLINIC | Age: 37
End: 2022-12-28

## 2022-12-28 DIAGNOSIS — Z30.9 ENCOUNTER FOR CONTRACEPTIVE MANAGEMENT, UNSPECIFIED TYPE: ICD-10-CM

## 2022-12-30 RX ORDER — LEVONORGESTREL/ETHIN.ESTRADIOL 0.1-0.02MG
1 TABLET ORAL DAILY
Qty: 84 TABLET | Refills: 0 | Status: SHIPPED | OUTPATIENT
Start: 2022-12-30 | End: 2023-03-23

## 2022-12-30 NOTE — TELEPHONE ENCOUNTER
Medication filled 1 time as pt is due for a follow-up in clinic. Pharmacy has been notified to inform patient to call clinic and schedule appointment. and Patient is already scheduled for upcoming appointment.   Prescription approved per [facility] Refill Protocol.  Justice L. Phoenix, RN

## 2023-03-22 ASSESSMENT — ENCOUNTER SYMPTOMS
HEADACHES: 0
JOINT SWELLING: 0
FEVER: 0
BREAST MASS: 0
DIZZINESS: 0
HEMATOCHEZIA: 0
SORE THROAT: 0
FREQUENCY: 0
DIARRHEA: 0
ARTHRALGIAS: 0
COUGH: 0
ABDOMINAL PAIN: 0
HEMATURIA: 0
HEARTBURN: 0
NAUSEA: 0
CONSTIPATION: 0
SHORTNESS OF BREATH: 0
PARESTHESIAS: 0
CHILLS: 0
MYALGIAS: 0
EYE PAIN: 0
NERVOUS/ANXIOUS: 0
PALPITATIONS: 0
DYSURIA: 0
WEAKNESS: 0

## 2023-03-23 ENCOUNTER — OFFICE VISIT (OUTPATIENT)
Dept: INTERNAL MEDICINE | Facility: CLINIC | Age: 38
End: 2023-03-23
Payer: COMMERCIAL

## 2023-03-23 VITALS
RESPIRATION RATE: 15 BRPM | HEIGHT: 62 IN | SYSTOLIC BLOOD PRESSURE: 116 MMHG | BODY MASS INDEX: 31.27 KG/M2 | DIASTOLIC BLOOD PRESSURE: 78 MMHG | TEMPERATURE: 97.8 F | OXYGEN SATURATION: 99 % | WEIGHT: 169.9 LBS | HEART RATE: 88 BPM

## 2023-03-23 DIAGNOSIS — Z00.00 ENCOUNTER FOR ROUTINE ADULT HEALTH EXAMINATION WITHOUT ABNORMAL FINDINGS: ICD-10-CM

## 2023-03-23 DIAGNOSIS — Z23 NEED FOR DIPHTHERIA-TETANUS-PERTUSSIS (TDAP) VACCINE: ICD-10-CM

## 2023-03-23 DIAGNOSIS — E03.9 HYPOTHYROIDISM, UNSPECIFIED TYPE: ICD-10-CM

## 2023-03-23 LAB
ALBUMIN SERPL BCG-MCNC: 4.5 G/DL (ref 3.5–5.2)
ALP SERPL-CCNC: 55 U/L (ref 35–104)
ALT SERPL W P-5'-P-CCNC: 14 U/L (ref 10–35)
ANION GAP SERPL CALCULATED.3IONS-SCNC: 13 MMOL/L (ref 7–15)
AST SERPL W P-5'-P-CCNC: 18 U/L (ref 10–35)
BILIRUB SERPL-MCNC: 0.5 MG/DL
BUN SERPL-MCNC: 9.6 MG/DL (ref 6–20)
CALCIUM SERPL-MCNC: 9.3 MG/DL (ref 8.6–10)
CHLORIDE SERPL-SCNC: 103 MMOL/L (ref 98–107)
CHOLEST SERPL-MCNC: 244 MG/DL
CREAT SERPL-MCNC: 0.76 MG/DL (ref 0.51–0.95)
DEPRECATED HCO3 PLAS-SCNC: 21 MMOL/L (ref 22–29)
ERYTHROCYTE [DISTWIDTH] IN BLOOD BY AUTOMATED COUNT: 13.6 % (ref 10–15)
GFR SERPL CREATININE-BSD FRML MDRD: >90 ML/MIN/1.73M2
GLUCOSE SERPL-MCNC: 94 MG/DL (ref 70–99)
HCT VFR BLD AUTO: 40.6 % (ref 35–47)
HDLC SERPL-MCNC: 48 MG/DL
HGB BLD-MCNC: 13.3 G/DL (ref 11.7–15.7)
LDLC SERPL CALC-MCNC: 153 MG/DL
MCH RBC QN AUTO: 28.1 PG (ref 26.5–33)
MCHC RBC AUTO-ENTMCNC: 32.8 G/DL (ref 31.5–36.5)
MCV RBC AUTO: 86 FL (ref 78–100)
NONHDLC SERPL-MCNC: 196 MG/DL
PLATELET # BLD AUTO: 398 10E3/UL (ref 150–450)
POTASSIUM SERPL-SCNC: 4 MMOL/L (ref 3.4–5.3)
PROT SERPL-MCNC: 7.3 G/DL (ref 6.4–8.3)
RBC # BLD AUTO: 4.74 10E6/UL (ref 3.8–5.2)
SODIUM SERPL-SCNC: 137 MMOL/L (ref 136–145)
T4 FREE SERPL-MCNC: 1.5 NG/DL (ref 0.9–1.7)
TRIGL SERPL-MCNC: 215 MG/DL
TSH SERPL DL<=0.005 MIU/L-ACNC: 10.5 UIU/ML (ref 0.3–4.2)
WBC # BLD AUTO: 8.4 10E3/UL (ref 4–11)

## 2023-03-23 PROCEDURE — 80061 LIPID PANEL: CPT | Performed by: INTERNAL MEDICINE

## 2023-03-23 PROCEDURE — 90715 TDAP VACCINE 7 YRS/> IM: CPT | Performed by: INTERNAL MEDICINE

## 2023-03-23 PROCEDURE — 85027 COMPLETE CBC AUTOMATED: CPT | Performed by: INTERNAL MEDICINE

## 2023-03-23 PROCEDURE — 84439 ASSAY OF FREE THYROXINE: CPT | Performed by: INTERNAL MEDICINE

## 2023-03-23 PROCEDURE — 99213 OFFICE O/P EST LOW 20 MIN: CPT | Mod: 25 | Performed by: INTERNAL MEDICINE

## 2023-03-23 PROCEDURE — 84443 ASSAY THYROID STIM HORMONE: CPT | Performed by: INTERNAL MEDICINE

## 2023-03-23 PROCEDURE — 99395 PREV VISIT EST AGE 18-39: CPT | Mod: 25 | Performed by: INTERNAL MEDICINE

## 2023-03-23 PROCEDURE — 90471 IMMUNIZATION ADMIN: CPT | Performed by: INTERNAL MEDICINE

## 2023-03-23 PROCEDURE — 36415 COLL VENOUS BLD VENIPUNCTURE: CPT | Performed by: INTERNAL MEDICINE

## 2023-03-23 PROCEDURE — 80053 COMPREHEN METABOLIC PANEL: CPT | Performed by: INTERNAL MEDICINE

## 2023-03-23 RX ORDER — PRENATAL VIT/IRON FUM/FOLIC AC 27MG-0.8MG
1 TABLET ORAL DAILY
Qty: 90 TABLET | Refills: 3 | COMMUNITY
Start: 2023-03-23

## 2023-03-23 ASSESSMENT — ENCOUNTER SYMPTOMS
DIZZINESS: 0
SHORTNESS OF BREATH: 0
HEMATURIA: 0
DIARRHEA: 0
PARESTHESIAS: 0
HEARTBURN: 0
SORE THROAT: 0
EYE PAIN: 0
HEMATOCHEZIA: 0
ARTHRALGIAS: 0
PALPITATIONS: 0
BREAST MASS: 0
JOINT SWELLING: 0
FREQUENCY: 0
HEADACHES: 0
ABDOMINAL PAIN: 0
MYALGIAS: 0
NERVOUS/ANXIOUS: 0
FEVER: 0
COUGH: 0
WEAKNESS: 0
DYSURIA: 0
CHILLS: 0
NAUSEA: 0
CONSTIPATION: 0

## 2023-03-23 NOTE — PROGRESS NOTES
SUBJECTIVE:   CC: Faith Hernández is an 37 year old who presents for preventive health visit and follow-up regarding hypothyroidism     Patient has been advised of split billing requirements and indicates understanding: Yes     Healthy Habits:     Getting at least 3 servings of Calcium per day:  Yes    Bi-annual eye exam:  Yes    Dental care twice a year:  Yes    Sleep apnea or symptoms of sleep apnea:  None    Diet:  Regular (no restrictions)    Frequency of exercise:  1 day/week    Duration of exercise:  15-30 minutes    Taking medications regularly:  Yes    Medication side effects:  None    PHQ-2 Total Score: 0    Additional concerns today:  Yes    PROBLEMS TO ADD ON...  1. Discuss health and getting pregnant     Today's PHQ-2 Score:   PHQ-2 ( 1999 [employer]) 3/22/2023   Q1: Little interest or pleasure in doing things 0   Q2: Feeling down, depressed or hopeless 0   PHQ-2 Score 0   PHQ-2 Total Score (12-17 Years)- Positive if 3 or more points; Administer PHQ-A if positive -   Q1: Little interest or pleasure in doing things Not at all   Q2: Feeling down, depressed or hopeless Not at all   PHQ-2 Score 0           Social History     Tobacco Use     Smoking status: Never     Smokeless tobacco: Never   Substance Use Topics     Alcohol use: Yes     Comment: 6-10 per week          Alcohol Use 3/22/2023   Prescreen: >3 drinks/day or >7 drinks/week? No   AUDIT SCORE  -     Reviewed orders with patient.  Reviewed health maintenance and updated orders accordingly - Yes  Labs reviewed in EPIC    Breast Cancer Screening:    Breast CA Risk Assessment (FHS-7) 3/28/2022   Do you have a family history of breast, colon, or ovarian cancer? No / Unknown         Patient under 40 years of age: Routine Mammogram Screening not recommended.   Pertinent mammograms are reviewed under the imaging tab.    History of abnormal Pap smear: NO - age 30-65 PAP every 5 years with negative HPV co-testing recommended  PAP / HPV Latest Ref Rng & Units  "1/15/2021 10/21/2016 6/14/2013   PAP (Historical) - NIL NIL NIL   HPV16 NEG:Negative Negative Negative -   HPV18 NEG:Negative Negative Negative -   HRHPV NEG:Negative Negative Negative -     Reviewed and updated as needed this visit by clinical staff   Tobacco  Allergies  Meds  Problems             Reviewed and updated as needed this visit by Provider                     Review of Systems   Constitutional: Negative for chills and fever.   HENT: Negative for congestion, ear pain, hearing loss and sore throat.    Eyes: Negative for pain and visual disturbance.   Respiratory: Negative for cough and shortness of breath.    Cardiovascular: Negative for chest pain, palpitations and peripheral edema.   Gastrointestinal: Negative for abdominal pain, constipation, diarrhea, heartburn, hematochezia and nausea.   Breasts:  Negative for tenderness, breast mass and discharge.   Genitourinary: Negative for dysuria, frequency, genital sores, hematuria, pelvic pain, urgency, vaginal bleeding and vaginal discharge.   Musculoskeletal: Negative for arthralgias, joint swelling and myalgias.   Skin: Negative for rash.   Neurological: Negative for dizziness, weakness, headaches and paresthesias.   Psychiatric/Behavioral: Negative for mood changes. The patient is not nervous/anxious.       Weight up 2 pounds  Generally has 6 to 10 glasses of wine per week.  Planning on becoming pregnant in near future.  Birth control pills at this time   Periods regular  Due for eye exam   Due for tetanus and covid boosters  Pap/pelvic examination up-to-date  Planning to start more exercise soon     OBJECTIVE:   /78   Pulse 88   Temp 97.8  F (36.6  C) (Temporal)   Resp 15   Ht 1.575 m (5' 2\")   Wt 77.1 kg (169 lb 14.4 oz)   LMP 03/01/2023 (Exact Date)   SpO2 99%   BMI 31.08 kg/m    Physical Exam  General appearance -   alert, no distress  Skin - No rashes or lesions.  Head - normocephalic, atraumatic  Eyes - PEACE, EOMI, fundi exam with " nondilated pupils negative.  Ears - External ears normal. Canals clear. TM's normal.  Nose/Sinuses - Nares normal. Septum midline. Mucosa normal. No drainage or sinus tenderness.  Oropharynx - No erythema, no adenopathy, no exudates.  Neck - Supple without adenopathy or thyromegaly. No bruits.  Lungs - Clear to auscultation without wheezes/rhonchi.  Heart - Regular rate and rhythm without murmurs, clicks, or gallops.  Nodes - No supraclavicular, axillary, or inguinal adenopathy palpable.  Breasts - deferred  Abdomen - Abdomen mildly obese, soft, non-tender. BS normal. No masses or hepatosplenomegaly palpable. No bruits.  Extremities -No cyanosis, clubbing or edema.    Musculoskeletal - Spine ROM normal. Muscular strength intact.   Peripheral pulses - radial=4/4, femoral=4/4, posterior tibial=4/4, dorsalis pedis=4/4,  Neuro - Gait normal. Reflexes normal and symmetric. Sensation grossly WNL.  Genital/Rectal - deferred      ASSESSMENT/PLAN:   1. Encounter for routine adult health examination without abnormal findings  Mild obesity.  Weight up a couple pounds.  Counseled regarding calorie/carbohydrate reduction and increase in exercise.  Specifically addressed current alcohol consumption Will increase caloric intake and also needs to be sober during future pregnancy.  Therefore counseled patient to discontinue alcohol use.  If wishing to get pregnant in the future, patient will stop birth control pills now.  Recommended she start prenatal vitamin once a day now.  Screening labs as ordered.  Tetanus booster as below, will get COVID vaccination booster through pharmacy  - Comprehensive metabolic panel; Future  - Lipid panel reflex to direct LDL Fasting; Future  - CBC with platelets; Future  - Comprehensive metabolic panel  - Lipid panel reflex to direct LDL Fasting  - CBC with platelets    2. Hypothyroidism, unspecified type  Weight up a couple pounds likely related to dietary issues.  Check thyroid function.  Continue  current levothyroxine dose for now pending results.  If patient comes pregnant, she was instructed to let me know and we will refer on to endocrinology for assistance in management during the pregnancy including probable need for future levothyroxine dose increase during later parts of pregnancy  - TSH WITH FREE T4 REFLEX; Future  - TSH WITH FREE T4 REFLEX    3. Need for diphtheria-tetanus-pertussis (Tdap) vaccine  Due for tetanus booster including repeat pertussis booster considering pregnancy in near future.  Tdap vaccination given today  - TDAP VACCINE (Adacel, Boostrix)      Patient has been advised of split billing requirements and indicates understanding: Yes      COUNSELING:  Reviewed preventive health counseling, as reflected in patient instructions        She reports that she has never smoked. She has never used smokeless tobacco.    PLAN:   Stop birth control pills  Continue other medications  Prescriptions  will be refilled after labs back as appropriate   Start Prenatal vitamin with iron once a day. May get over the counter  Labs today as ordered  Reduce calorie/carbohydrate (sugar, bread, potato, pasta, rice, alcohol etc)  intake in diet.  Increase color on your plate with vegetables. Increase  frequency of walking or other aerobic exercise as able (goal is daily)   Would stop alcohol intake   Vaccinations: TDAP (tetanus) vaccine  If you become pregnant, then notify me via MyChart and will refer on to endocrinology for assistance in thyroid management during the pregnancy   I would recommend a covid booster vaccination. You may have it done at any pharmacy. If you wish to have it done at a [facility] pharmacy, then go to [email or web address] to schedule a vaccination appointment  Pt was informed regarding extra E&M billing for management of new or established medical issues not related to today's wellness/screening visit      Michael Arenas MD  [facility]

## 2023-03-23 NOTE — PATIENT INSTRUCTIONS
Stop birth control pills  Continue other medications  Prescriptions  will be refilled after labs back as appropriate   Start Prenatal vitamin with iron once a day. May get over the counter  Labs today as ordered  Reduce calorie/carbohydrate (sugar, bread, potato, pasta, rice, alcohol etc)  intake in diet.  Increase color on your plate with vegetables. Increase  frequency of walking or other aerobic exercise as able (goal is daily)   Would stop alcohol intake   Vaccinations: TDAP (tetanus) vaccine  If you become pregnant, then notify me via MyChart and will refer on to endocrinology for assistance in thyroid management during the pregnancy   I would recommend a covid booster vaccination. You may have it done at any pharmacy. If you wish to have it done at a [facility] pharmacy, then go to [email or web address] to schedule a vaccination appointment  Pt was informed regarding extra E&M billing for management of new or established medical issues not related to today's wellness/screening visit

## 2023-04-02 ENCOUNTER — MYC MEDICAL ADVICE (OUTPATIENT)
Dept: INTERNAL MEDICINE | Facility: CLINIC | Age: 38
End: 2023-04-02
Payer: COMMERCIAL

## 2023-04-02 DIAGNOSIS — E03.9 HYPOTHYROIDISM, UNSPECIFIED TYPE: Primary | ICD-10-CM

## 2023-05-19 ENCOUNTER — LAB (OUTPATIENT)
Dept: LAB | Facility: CLINIC | Age: 38
End: 2023-05-19
Payer: COMMERCIAL

## 2023-05-19 DIAGNOSIS — E03.9 HYPOTHYROIDISM, UNSPECIFIED TYPE: ICD-10-CM

## 2023-05-19 LAB
T3FREE SERPL-MCNC: 2.7 PG/ML (ref 2–4.4)
T4 FREE SERPL-MCNC: 1.68 NG/DL (ref 0.9–1.7)
TSH SERPL DL<=0.005 MIU/L-ACNC: 2.32 UIU/ML (ref 0.3–4.2)

## 2023-05-19 PROCEDURE — 36415 COLL VENOUS BLD VENIPUNCTURE: CPT

## 2023-05-19 PROCEDURE — 84439 ASSAY OF FREE THYROXINE: CPT

## 2023-05-19 PROCEDURE — 84443 ASSAY THYROID STIM HORMONE: CPT

## 2023-05-19 PROCEDURE — 84481 FREE ASSAY (FT-3): CPT

## 2023-06-02 ENCOUNTER — IMMUNIZATION (OUTPATIENT)
Dept: NURSING | Facility: CLINIC | Age: 38
End: 2023-06-02
Payer: COMMERCIAL

## 2023-06-02 PROCEDURE — 91312 COVID-19 BIVALENT 12+ (PFIZER): CPT

## 2023-06-02 PROCEDURE — 0121A COVID-19 BIVALENT 12+ (PFIZER): CPT

## 2023-06-04 ENCOUNTER — MYC MEDICAL ADVICE (OUTPATIENT)
Dept: INTERNAL MEDICINE | Facility: CLINIC | Age: 38
End: 2023-06-04
Payer: COMMERCIAL

## 2023-06-04 DIAGNOSIS — E03.9 HYPOTHYROIDISM, UNSPECIFIED TYPE: ICD-10-CM

## 2023-06-15 RX ORDER — LEVOTHYROXINE SODIUM 150 UG/1
150 TABLET ORAL DAILY
Qty: 90 TABLET | Refills: 3 | Status: SHIPPED | OUTPATIENT
Start: 2023-06-15 | End: 2023-06-15

## 2023-06-15 RX ORDER — LEVOTHYROXINE SODIUM 150 UG/1
150 TABLET ORAL DAILY
Qty: 90 TABLET | Refills: 3 | Status: SHIPPED | OUTPATIENT
Start: 2023-06-15 | End: 2024-06-12

## 2024-01-31 NOTE — TELEPHONE ENCOUNTER
[facility] Medicine Services  PROGRESS NOTE    Patient Name: Isi Bolton  : 1970  MRN: [id number]    Date of Admission: 2024  Primary Care Physician: Chloe Barksdale MD    Subjective   Subjective     CC:  F/U N/V/D, concern for seizure activity     HPI:  Patient seen and examined. No new issues overnight. Had 2 episodes of diarrhea yesterday. No fever or chills. Still having some nausea. The liquids make her sick to her stomach. Discussed that both Neurology and I agree that likely she did not have a seizure.     Objective   Objective     Vital Signs:   Temp:  [98 °F (36.7 °C)-98.6 °F (37 °C)] 98.6 °F (37 °C)  Heart Rate:  [64-91] 73  Resp:  [16-18] 16  BP: (119-150)/() 144/100  Flow (L/min):  [2] 2     Physical Exam:  Constitutional: No acute distress, awake, alert  HENT: NCAT, mucous membranes moist  Respiratory: Clear to auscultation bilaterally, respiratory effort normal   Cardiovascular: RRR, no murmurs, rubs, or gallops  Gastrointestinal: Positive bowel sounds, soft, nontender, nondistended  Musculoskeletal: No bilateral ankle edema  Psychiatric: Appropriate affect, cooperative  Neurologic: Oriented x 3, DEAN, speech clear  Skin: No rashes     Results Reviewed:  LAB RESULTS:      Lab 24  0326 24  0642 24  0055 24  0007 245 24   WBC 6.48 11.09*  --   --   --  15.34*   HEMOGLOBIN 9.1* 10.1*  --   --   --  11.5*   HEMOGLOBIN, POC  --   --   --   --  12.2  --    HEMATOCRIT 27.8* 29.4*  --   --   --  33.2*   HEMATOCRIT POC  --   --   --   --  36*  --    PLATELETS 198 257  --   --   --  310   NEUTROS ABS  --  8.59*  --   --   --  10.75*   IMMATURE GRANS (ABS)  --  0.05  --   --   --  0.10*   LYMPHS ABS  --  1.76  --   --   --  3.49*   MONOS ABS  --  0.59  --   --   --  0.76   EOS ABS  --  0.07  --   --   --  0.18   .6* 99.7*  --   --   --  97.4*   SED RATE  --   --   --   --   --  18   CRP  --   --  <0.30  --   duplicate    --   --    LACTATE  --   --   --  0.9  --  3.8*         Lab 01/31/24  0326 01/30/24  2333 01/30/24  1216 01/30/24  0642 01/30/24  0055 01/29/24 2045 01/29/24 2037   SODIUM 132*  --   --  135* 130*  --  129*   POTASSIUM 3.3*  --   --  4.1  --   --  3.3*   CHLORIDE 99  --   --  99  --   --  88*   CO2 21.0*  --   --  20.0*  --   --  21.0*   ANION GAP 12.0  --   --  16.0*  --   --  20.0*   BUN 4*  --   --  6  --   --  7   CREATININE 0.62  --   --  0.89  --  1.00 1.05*   EGFR 106.0  --   --  77.2  --  67.1 63.3   GLUCOSE 83  --   --  106*  --   --  118*   CALCIUM 7.6*  --   --  7.7*  --   --  8.5*   IONIZED CALCIUM  --   --  1.09*  --   --   --   --    MAGNESIUM  --  2.0  --  2.8* 1.0*  --   --          Lab 01/29/24 2037   TOTAL PROTEIN 7.3   ALBUMIN 4.2   GLOBULIN 3.1   ALT (SGPT) 11   AST (SGOT) 23   BILIRUBIN 0.6   ALK PHOS 83   LIPASE 52                 Lab 01/30/24  1444   FOLATE 15.00   VITAMIN B 12 >2,000*         Brief Urine Lab Results  (Last result in the past 365 days)        Color   Clarity   Blood   Leuk Est   Nitrite   Protein   CREAT   Urine HCG        01/29/24 2029 Yellow   Clear   Negative   Negative   Negative   Negative                   Microbiology Results Abnormal       Procedure Component Value - Date/Time    Gastrointestinal Panel, PCR - Stool, Per Rectum [[id number]]  (Normal) Collected: 01/30/24 0720    Lab Status: Final result Specimen: Stool from Per Rectum Updated: 01/30/24 0978     Campylobacter Not Detected     Plesiomonas shigelloides Not Detected     Salmonella Not Detected     Vibrio Not Detected     Vibrio cholerae Not Detected     Yersinia enterocolitica Not Detected     Enteroaggregative E. coli (EAEC) Not Detected     Enteropathogenic E. coli (EPEC) Not Detected     Enterotoxigenic E. coli (ETEC) lt/st Not Detected     Shiga-like toxin-producing E. coli (STEC) stx1/stx2 Not Detected     Shigella/Enteroinvasive E. coli (EIEC) Not Detected     Cryptosporidium Not Detected      Cyclospora cayetanensis Not Detected     Entamoeba histolytica Not Detected     Giardia lamblia Not Detected     Adenovirus F40/41 Not Detected     Astrovirus Not Detected     Norovirus GI/GII Not Detected     Rotavirus A Not Detected     Sapovirus (I, II, IV or V) Not Detected    Clostridioides difficile toxin Ag, Reflex - Stool, Per Rectum [[id number]]  (Normal) Collected: 01/30/24 0627    Lab Status: Final result Specimen: Stool from Per Rectum Updated: 01/30/24 0913     C.diff Toxin Ag Negative    Narrative:      DNA from a toxigenic strain of C.difficile was detected, although the free toxin itself was not detected. These findings are consistent with C.difficile colonization and may not reflect actual C.difficile infection. Clinical correlation needed.            EEG    Result Date: 1/30/2024  Reason for referral: 53 y.o.female with seizure Technical Summary:  A 19 channel digital EEG was performed using the international 10-20 placement system, including eye leads and EKG leads. Duration: 20 minutes Findings: The patient is awake.  Diffuse low amplitude alpha activity is present symmetrically over both hemispheres.  EMG and eye blink artifact are seen anteriorly.  At times a 12 Hz posterior rhythm is evident symmetrically over the occipital leads.  Sleep is not seen.  Photic stimulation yields a symmetric driving response.  No focal features or epileptiform activity are present. Video: Available Technical quality: Superior EKG: Regular, 80 bpm SUMMARY: Normal EEG in the awake state No focal features or epileptiform activity are seen     Impression: Normal study This report is transcribed using the Dragon dictation system.      MRI Brain Without Contrast    Result Date: 1/30/2024  MRI BRAIN WO CONTRAST Date of Exam: 1/30/2024 7:40 AM EST Indication: Seizure, new-onset, no history of trauma.  Comparison: 4/11/2017. Technique:  Routine multiplanar/multisequence sequence images of the brain were obtained without  contrast administration. Findings: There are no areas of restricted diffusion. There are a few scattered foci of increased signal intensity in the right frontal and parietal lobes as best seen on the inversion recovery images. This has advanced only minimally since the prior study. There is no acute mass effect or edema. There is no acute CVA or hemorrhage. Ventricles are normal in size and shape and are midline. Structures of the posterior fossa and brainstem appear normal.    Impression: Impression: Evidence of mild chronic small vessel ischemic insult. No acute process. Electronically Signed: Adwoa Eller MD  1/30/2024 8:34 AM EST  Workstation ID: [id number]    CT Abdomen Pelvis With Contrast    Result Date: 1/29/2024  CT ABDOMEN PELVIS W CONTRAST Date of Exam: 1/29/2024 10:58 PM EST Indication: Nausea, vomiting, diarrhea, abdominal pain.  Laparoscopic resection of gastric diverticulum on January 18. Comparison: 12/25/2023, 1/19/2024. Technique: Axial CT images were obtained of the abdomen and pelvis following the uneventful intravenous administration of intravenous contrast. Reconstructed coronal and sagittal images were also obtained. Automated exposure control and iterative construction methods were used. Findings: Lung Bases:   The visualized lung bases and lower mediastinal structures are unremarkable. Liver: Liver is normal in size and CT density. No focal lesions. Biliary/Gallbladder:  The gallbladder has been resected. The biliary tree is nondilated. Spleen: Spleen is normal in size and CT density. Pancreas:  Pancreas is normal. There is no evidence of pancreatic mass or peripancreatic fluid. Kidneys:  Kidneys are normal in size. There are no stones or hydronephrosis. Adrenals:  Adrenal glands are unremarkable. Retroperitoneal/Lymph Nodes/Vasculature:  No retroperitoneal adenopathy is identified. Gastrointestinal/Mesentery:  Significant postsurgical changes are present within the stomach. Multiple clips  are present. No abnormal free fluid or collection identified. No evidence of free air. Stomach appears normal in caliber. No significant stool burden. No evidence of hernia. The bowel loops are non-dilated without wall thickening or mass. The appendix appears within normal limits. No evidence of obstruction. No free air. No mesenteric fluid collections identified. Bladder:  Mild circumferential bladder wall thickening is present with suspected mild stranding within the adjacent fat. Limited evaluation due to under distention.. Genital:   Unremarkable       Bony Structures:   Visualized bony structures are consistent with the patient's age.     Impression: Impression: 1.No acute intra-abdominal or intrapelvic process. Significant postsurgical changes are present within the stomach. No evidence of obstruction. No abnormal free fluid or collection identified. 2.Mild circumferential bladder wall thickening with suspected mild stranding within the adjacent fat. Limited evaluation due to under distention. Recommend clinical correlation for findings of cystitis. 3.Ancillary findings as described above. Electronically Signed: Sonia Rider MD  1/29/2024 11:21 PM EST  Workstation ID: [id number]    CT Head Without Contrast    Result Date: 1/29/2024  CT HEAD WO CONTRAST Date of Exam: 1/29/2024 10:58 PM EST Indication: Seizure. Comparison: 4/11/2017. Technique: Axial CT images were obtained of the head without contrast administration.  Automated exposure control and iterative construction methods were used. Findings: There is no evidence of hemorrhage. There is no mass effect or midline shift. There is no extracerebral collection. Ventricles are normal in size and configuration for patient's stated age.  Posterior fossa is within normal limits. Calvarium and skull base appear intact.   Visualized sinuses show no air fluid levels. Visualized orbits are unremarkable.     Impression: Impression: No acute intracranial process  identified. Electronically Signed: Sonia Rider MD  1/29/2024 11:16 PM EST  Workstation ID: [id number]     Results for orders placed during the hospital encounter of 08/18/22    Adult Transthoracic Echo Complete W/ Cont if Necessary Per Protocol    Interpretation Summary  · Normal left ventricular cavity size and wall thickness noted.  · Left ventricular ejection fraction appears to be 61 - 65%.  · Estimated right ventricular systolic pressure from tricuspid regurgitation is normal (<35 mmHg).  · The aortic valve is structurally normal with no regurgitation or stenosis present.  · The mitral valve is structurally normal with no significant stenosis present. Mild mitral valve regurgitation is present.  · Mild tricuspid valve regurgitation is present. Estimated right ventricular systolic pressure from tricuspid regurgitation is normal (<35 mmHg).  · There is no evidence of pericardial effusion. .      Current medications:  Scheduled Meds:budesonide, 0.5 mg, Nebulization, BID - RT  busPIRone, 15 mg, Oral, Q12H  cetirizine, 10 mg, Oral, Daily  cholestyramine light, 1 packet, Oral, Daily  cloNIDine, 0.1 mg, Oral, BID  montelukast, 10 mg, Oral, Nightly  propranolol, 40 mg, Oral, Q12H  sodium chloride, 10 mL, Intravenous, Q12H  sucralfate, 1 g, Oral, 4x Daily AC & at Bedtime  vancomycin, 125 mg, Oral, Q6H  Vortioxetine HBr, 20 mg, Oral, Daily      Continuous Infusions:Pharmacy Consult,   sodium chloride, 50 mL/hr, Last Rate: 50 mL/hr (01/31/24 1215)      PRN Meds:.  acetaminophen **OR** acetaminophen **OR** acetaminophen    albuterol    ALPRAZolam    Calcium Replacement - Follow Nurse / BPA Driven Protocol    Magnesium Standard Dose Replacement - Follow Nurse / BPA Driven Protocol    nitroglycerin    ondansetron ODT **OR** ondansetron    Pharmacy Consult    Phosphorus Replacement - Follow Nurse / BPA Driven Protocol    Potassium Replacement - Follow Nurse / BPA Driven Protocol    Sodium Chloride (PF)    [COMPLETED] Insert  Peripheral IV **AND** sodium chloride    sodium chloride    sodium chloride    Assessment & Plan   Assessment & Plan     Active Hospital Problems    Diagnosis  POA    **Seizure [R56.9]  Yes    Lactic acidosis [E87.20]  Unknown    Hypokalemia [E87.6]  Unknown    Hyponatremia [E87.1]  Unknown    ETOH abuse [F10.10]  Unknown    Hypomagnesemia [E83.42]  Yes    Inflammatory bowel disease (Crohn's disease) [K50.90]  Yes    Essential hypertension [I10]  Yes      Resolved Hospital Problems   No resolved problems to display.        Brief Hospital Course to date:  Isi Bolton is a 53 y.o. female with a past medical history of Crohn's disease, HTN, GERD, anxiety, depression, gastric diverticulitis s/p laparoscopic resection, hill fundoplication (on 1/18/2024 with Dr. Haji) who presented to [facility] ED with complaint of nausea, vomiting, diarrhea and abdominal pain.  Patient endorses fatigue, dizziness, lightheadedness and headache.  Patient endorses after surgery she had been progressing her food appropriately complaint.  However over the past 2 days she has been experiencing severe nausea and vomiting.  She has also developed diarrhea.  She has complaint of epigastric pain.  Patient states that she does not frequently consume alcohol.  She states her last drink was on 1/26/24.       While in the ED there was concern for possible seizure-like activity and patient was loaded with Keppra and Ativan.  It is unclear if event was witnessed by medical professionals.  Per patient she did not lose control of bowel or bladder.  No tongue bite is noted.  She states she is unable to recall exact details of the event.  She states her boyfriend witnessed the event.  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  Patient denies any aura prior to event or postictal phase.  Patient denies any previous seizure history     Seizure-like activity   --Discussed with Neurology, not entirely convinced this was  a seizure  --EEG WNL  --MRI brain WNL   --Seizure precautions  --CIWA per shift, if starts scoring will add PRNs. Has been Zero   --Episode likely 2/2 to acute illness, electrolyte derangements, dehydration     Lactic acidosis--resolved  -- S/P IVF     Hypomagnesemia  --Resolved with replacement      Hyponatremia  -- DC further IVF     Hypokalemia  --Replace per protocol      Leukocytosis  -- Trended down, no left shift   -- Likely reactive 2/2 above      Abdominal pain with nausea, vomiting, diarrhea  Crohn's disease  Gastric diverticulum   -- s/p laparoscopic resection, hill fundoplication, EGD with Dr. Haji on 1/18/2024  -- CT abdomen pelvis shows no acute intra-abdominal or intrapelvic process.  Significant postsurgical changes are present within the stomach, no evidence of obstruction, no abnormal free fluid or collection identified.  -- Dr Haji evaluated, felt likely 2/2 to her Crohn's   -- GI PCR negative  -- C diff toxin + but Ag negative. 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 Never been treated for it. No recent ABX except one dose of Cefazolin prior to surgery on 1/18.   -- Continue PO Vanc until ID evaluates      HTN  -- resume home lisinopril, HCTZ     Anxiety  -- Xanax, buspar, hydroxyzine    Expected Discharge Location and Transportation: Home when diarrhea improves, oral intake improves, electrolytes stable  Expected Discharge   Expected Discharge Date: 2/2/2024; Expected Discharge Time:      DVT prophylaxis:  Mechanical DVT prophylaxis orders are present.         AM-PAC 6 Clicks Score (PT): 23 (01/30/24 1105)    CODE STATUS:   Code Status and Medical Interventions:   Ordered at: 01/30/24 0212     Level Of Support Discussed With:    Patient     Code Status (Patient has no pulse and is not breathing):    CPR (Attempt to Resuscitate)     Medical Interventions (Patient has pulse or is breathing):    Full Support       Lydia Keenan,  DO  01/31/24

## 2024-06-09 ENCOUNTER — HEALTH MAINTENANCE LETTER (OUTPATIENT)
Age: 39
End: 2024-06-09

## 2024-06-11 DIAGNOSIS — E03.9 HYPOTHYROIDISM, UNSPECIFIED TYPE: ICD-10-CM

## 2024-06-12 RX ORDER — LEVOTHYROXINE SODIUM 150 UG/1
150 TABLET ORAL DAILY
Qty: 90 TABLET | Refills: 0 | Status: SHIPPED | OUTPATIENT
Start: 2024-06-12 | End: 2024-09-10

## 2024-09-10 DIAGNOSIS — E78.5 HYPERLIPIDEMIA LDL GOAL <100: Primary | ICD-10-CM

## 2024-09-10 DIAGNOSIS — E03.9 HYPOTHYROIDISM, UNSPECIFIED TYPE: ICD-10-CM

## 2024-09-10 DIAGNOSIS — Z13.1 SCREENING FOR DIABETES MELLITUS: ICD-10-CM

## 2024-09-10 RX ORDER — LEVOTHYROXINE SODIUM 150 UG/1
150 TABLET ORAL DAILY
Qty: 90 TABLET | Refills: 0 | Status: SHIPPED | OUTPATIENT
Start: 2024-09-10 | End: 2024-09-20 | Stop reason: DRUGHIGH

## 2024-09-10 NOTE — TELEPHONE ENCOUNTER
Pt not seen since March 2023 and last thyroid function lab done May 2023. Needs appt with me IN CLINIC in  next 30 days. Levothyroxine approved for 90 days through [facility] as requested.  Schedule appointment with me in the next 30 days.  May use any current  open appt slot except for the two end pf day virtual visit slots for appt in clinic with me.  Also schedule patient for a fasting lab appointment to be done in the week prior so I can review lab results with patient when seen back in clinic.  Future labs ordered

## 2024-09-11 ASSESSMENT — EDINBURGH POSTNATAL DEPRESSION SCALE (EPDS)
I HAVE FELT SCARED OR PANICKY FOR NO GOOD REASON: NO, NOT MUCH
THE THOUGHT OF HARMING MYSELF HAS OCCURRED TO ME: NEVER
I HAVE FELT SAD OR MISERABLE: NO, NOT AT ALL
I HAVE BEEN SO UNHAPPY THAT I HAVE BEEN CRYING: NO, NEVER
I HAVE BEEN ABLE TO LAUGH AND SEE THE FUNNY SIDE OF THINGS: AS MUCH AS I ALWAYS COULD
TOTAL SCORE: 3
I HAVE BEEN ANXIOUS OR WORRIED FOR NO GOOD REASON: HARDLY EVER
I HAVE LOOKED FORWARD WITH ENJOYMENT TO THINGS: AS MUCH AS I EVER DID
THINGS HAVE BEEN GETTING ON TOP OF ME: NO, I HAVE BEEN COPING AS WELL AS EVER
I HAVE BLAMED MYSELF UNNECESSARILY WHEN THINGS WENT WRONG: NOT VERY OFTEN
I HAVE BEEN SO UNHAPPY THAT I HAVE HAD DIFFICULTY SLEEPING: NOT AT ALL

## 2024-09-12 ENCOUNTER — VIRTUAL VISIT (OUTPATIENT)
Dept: OBGYN | Facility: CLINIC | Age: 39
End: 2024-09-12
Payer: COMMERCIAL

## 2024-09-12 ENCOUNTER — PRENATAL OFFICE VISIT (OUTPATIENT)
Dept: MIDWIFE SERVICES | Facility: CLINIC | Age: 39
End: 2024-09-12
Payer: COMMERCIAL

## 2024-09-12 ENCOUNTER — ANCILLARY PROCEDURE (OUTPATIENT)
Dept: ULTRASOUND IMAGING | Facility: CLINIC | Age: 39
End: 2024-09-12
Payer: COMMERCIAL

## 2024-09-12 VITALS — HEIGHT: 62 IN | WEIGHT: 168 LBS | BODY MASS INDEX: 30.91 KG/M2

## 2024-09-12 VITALS
SYSTOLIC BLOOD PRESSURE: 118 MMHG | HEIGHT: 62 IN | DIASTOLIC BLOOD PRESSURE: 64 MMHG | WEIGHT: 169.2 LBS | BODY MASS INDEX: 31.14 KG/M2

## 2024-09-12 DIAGNOSIS — O09.511 PRIMIGRAVIDA OF ADVANCED MATERNAL AGE IN FIRST TRIMESTER: Primary | ICD-10-CM

## 2024-09-12 DIAGNOSIS — O09.91 SUPERVISION OF HIGH RISK PREGNANCY IN FIRST TRIMESTER: ICD-10-CM

## 2024-09-12 DIAGNOSIS — O09.91 SUPERVISION OF HIGH RISK PREGNANCY IN FIRST TRIMESTER: Primary | ICD-10-CM

## 2024-09-12 DIAGNOSIS — O36.80X0 PREGNANCY WITH INCONCLUSIVE FETAL VIABILITY: ICD-10-CM

## 2024-09-12 DIAGNOSIS — Z13.79 GENETIC SCREENING: ICD-10-CM

## 2024-09-12 DIAGNOSIS — Z3A.09 9 WEEKS GESTATION OF PREGNANCY: ICD-10-CM

## 2024-09-12 PROBLEM — O09.90 SUPERVISION OF HIGH-RISK PREGNANCY: Status: ACTIVE | Noted: 2024-09-12

## 2024-09-12 LAB
ALBUMIN UR-MCNC: NEGATIVE MG/DL
APPEARANCE UR: CLEAR
BACTERIA #/AREA URNS HPF: ABNORMAL /HPF
BILIRUB UR QL STRIP: NEGATIVE
COLOR UR AUTO: YELLOW
GLUCOSE UR STRIP-MCNC: NEGATIVE MG/DL
HGB UR QL STRIP: NEGATIVE
KETONES UR STRIP-MCNC: NEGATIVE MG/DL
LEUKOCYTE ESTERASE UR QL STRIP: ABNORMAL
NITRATE UR QL: NEGATIVE
PH UR STRIP: 6 [PH] (ref 5–7)
RBC #/AREA URNS AUTO: ABNORMAL /HPF
SP GR UR STRIP: <=1.005 (ref 1–1.03)
SQUAMOUS #/AREA URNS AUTO: ABNORMAL /LPF
UROBILINOGEN UR STRIP-ACNC: 0.2 E.U./DL
WBC #/AREA URNS AUTO: ABNORMAL /HPF

## 2024-09-12 PROCEDURE — 87086 URINE CULTURE/COLONY COUNT: CPT | Performed by: NURSE PRACTITIONER

## 2024-09-12 PROCEDURE — 99207 PR NO CHARGE NURSE ONLY: CPT | Mod: 93

## 2024-09-12 PROCEDURE — 81001 URINALYSIS AUTO W/SCOPE: CPT | Performed by: NURSE PRACTITIONER

## 2024-09-12 PROCEDURE — 76801 OB US < 14 WKS SINGLE FETUS: CPT | Performed by: STUDENT IN AN ORGANIZED HEALTH CARE EDUCATION/TRAINING PROGRAM

## 2024-09-12 PROCEDURE — 99204 OFFICE O/P NEW MOD 45 MIN: CPT | Performed by: NURSE PRACTITIONER

## 2024-09-12 RX ORDER — ASCORBIC ACID
CRYSTALS ORAL
COMMUNITY
End: 2024-09-20 | Stop reason: DRUGHIGH

## 2024-09-12 NOTE — PROGRESS NOTES
SUBJECTIVE:     HPI:    This is a 38 year old female patient,  who presents for her first obstetrical visit.    NATHANIEL: 2025, by Last Menstrual Period.  She is 9w1d weeks.  Her cycles are regular.  Her last menstrual period was normal.   Since her LMP, she has experienced  fatigue and breast tenderness ).     Additional History: AMA, hypothyroidism    Have you travelled during the pregnancy?No  Have your sexual partner(s) travelled during the pregnancy?No    HISTORY:   Planned Pregnancy: Yes  Marital Status:   Occupation:  of Marketing at [employer]  Living in Household: Spouse-Humberto    Past History:  Her past medical history   Past Medical History:   Diagnosis Date    Hypothyroidism 2012   .      She has a history of   first pregnancy    Since her last LMP she denies use of alcohol, tobacco and street drugs.    Past medical, surgical, social and family history were reviewed and updated in [facility].        Current Outpatient Medications   Medication Sig Dispense Refill    Cyanocobalamin (VITAMIN B 12) 250 MCG LOZG Take by mouth.      levothyroxine (SYNTHROID/LEVOTHROID) 150 MCG tablet Take 1 tablet (150 mcg) by mouth daily. 90 tablet 0    Prenatal Vit-Fe Fumarate-FA (PRENATAL MULTIVITAMIN W/IRON) 27-0.8 MG tablet Take 1 tablet by mouth daily 90 tablet 3     No current facility-administered medications for this visit.       ROS:   Constitutional: Fatigue  Breast: Tenderness    Confirmed patient desires delivery at [facility] with the [facility] provider.    Nurse phone visit completed. Prenatal book and folder (containing standard educational hand-outs and brochures) will be given at the next visit to patient. Information in folder reviewed over the phone. Questions answered. Information given on optional screening available to assess chromosomal anomalies. Pt desires NIPS. Pt advised to call the clinic if she has any questions or concerns related to  "her pregnancy. Prenatal labs future ordered.   Teetee Madrigal RN on 9/12/2024 at 9:01 AM      Lab Results   Component Value Date    PAP NIL 01/15/2021   Recommended PAP/HPV in 5 years after pap in 2021    PHQ-9 score:        9/11/2024     5:17 PM   PHQ   PHQ-9 Total Score 1   Q9: Thoughts of better off dead/self-harm past 2 weeks Not at all                   9/11/2024     5:14 PM   SERGIO-7 SCORE   Total Score 2 (minimal anxiety)   Total Score 2           Patient supplied answers from flow sheet for:  Prenatal OB Questionnaire.  Past Medical History  Have you ever recieved care for your mental health? : No  Have you ever been in a major accident or suffered serious trauma?: No  Within the last year, has anyone hit, slapped, kicked or otherwise hurt you?: No  In the last year, has anyone forced you to have sex when you didn't want to?: No    Past Medical History 2   Have you ever received a blood transfusion?: No  Would you accept a blood transfusion if was medically recommended?: Yes  Does anyone in your home smoke?: No   Is your blood type Rh negative?: No  Have you ever ?: No  Have you been hospitalized for a nonsurgical reason excluding normal delivery?: No  Have you ever had an abnormal pap smear?: Unknown    Past Medical History (Continued)  Do you have a history of abnormalities of the uterus?: No  Did your mother take MADELINE or any other hormones when she was pregnant with you?: No  Do you have any other problems we have not asked about which you feel may be important to this pregnancy?: No                   OBJECTIVE:     EXAM:  Ht 1.575 m (5' 2\")   Wt 76.2 kg (168 lb)   LMP 07/10/2024   BMI 30.73 kg/m   Body mass index is 30.73 kg/m .  "

## 2024-09-12 NOTE — RESULT ENCOUNTER NOTE
Informed via mychart, viable IUP with LMP consistent dating, final NATHANIEL 4/16/25    Monica Millan, LUCIA, CNM

## 2024-09-12 NOTE — PROGRESS NOTES
SUBJECTIVE:      HPI:     This is a 38 year old female patient,  who presents for her first obstetrical visit.     NATHANIEL: 2025, by Last Menstrual Period, c/w US today.  She is 9w1d gestation.  Her cycles are regular.  Her last menstrual period was normal.   Since her LMP, she has experienced  fatigue and breast tenderness ).      Additional History:   AMA: will be 39 @ time of delivery  1st pregnancy  Hypothyroid     Have you travelled during the pregnancy?No  Have your sexual partner(s) travelled during the pregnancy?No     HISTORY:   Planned Pregnancy: Yes  Marital Status:   Occupation:  of Marketing at [employer]  Living in Household: Spouse-Humberto     Past History:  Her past medical history   Past Medical History        Past Medical History:   Diagnosis Date    Hypothyroidism 2012           Since her last LMP she denies use of alcohol, tobacco and street drugs.     Past medical, surgical, social and family history were reviewed and updated in [facility].     OB HISTORY  OB History    Para Term  AB Living   1 0 0 0 0 0   SAB IAB Ectopic Multiple Live Births   0 0 0 0 0      # Outcome Date GA Lbr Alejo/2nd Weight Sex Type Anes PTL Lv   1 Current                History of Sickle Cell, thalassemia or other hereditary blood disorder: No,   Other complications: Yes - AMA, 1st pregnancy, hypothyroid    PERSONAL HISTORY  Exercise Habits:  walking irregularly  Employment: Full time.  Her job involves sedentary activity with little potential for toxic exposure.    Travel plans:  not asked.   Diet: eats regular meals and takes daily vitamins  Abuse concerns? No  Hgb A1c screen:  BMI > 30: No, 1st degree family DM: No, History of GDM: No, PCOS: No, High risk ethnicity: Yes           Current Facility-Administered Medications          Current Outpatient Medications   Medication Sig Dispense Refill    Cyanocobalamin (VITAMIN B 12) 250 MCG LOZG Take by mouth.        levothyroxine  "(SYNTHROID/LEVOTHROID) 150 MCG tablet Take 1 tablet (150 mcg) by mouth daily. 90 tablet 0    Prenatal Vit-Fe Fumarate-FA (PRENATAL MULTIVITAMIN W/IRON) 27-0.8 MG tablet Take 1 tablet by mouth daily 90 tablet 3      No current facility-administered medications for this visit.            ROS:   Constitutional: Fatigue  Breast: Tenderness             Lab Results   Component Value Date     PAP NIL 01/15/2021   Recommended PAP/HPV in 5 years after pap in 2021     PHQ-9 score:         9/11/2024     5:17 PM   PHQ   PHQ-9 Total Score 1   Q9: Thoughts of better off dead/self-harm past 2 weeks Not at all                          9/11/2024     5:14 PM   SERGIO-7 SCORE   Total Score 2 (minimal anxiety)   Total Score 2               Patient supplied answers from flow sheet for:  Prenatal OB Questionnaire.  Past Medical History  Have you ever recieved care for your mental health? : No  Have you ever been in a major accident or suffered serious trauma?: No  Within the last year, has anyone hit, slapped, kicked or otherwise hurt you?: No  In the last year, has anyone forced you to have sex when you didn't want to?: No     Past Medical History 2   Have you ever received a blood transfusion?: No  Would you accept a blood transfusion if was medically recommended?: Yes  Does anyone in your home smoke?: No   Is your blood type Rh negative?: No  Have you ever ?: No  Have you been hospitalized for a nonsurgical reason excluding normal delivery?: No  Have you ever had an abnormal pap smear?: Unknown     Past Medical History (Continued)  Do you have a history of abnormalities of the uterus?: No  Did your mother take MADELINE or any other hormones when she was pregnant with you?: No  Do you have any other problems we have not asked about which you feel may be important to this pregnancy?: No            OBJECTIVE:     EXAM:  /64   Ht 1.577 m (5' 2.1\")   Wt 76.7 kg (169 lb 3.2 oz)   LMP 07/10/2024   BMI 30.85 kg/m   Body mass index " is 30.85 kg/m .    GENERAL: alert and no distress  EYES: Eyes grossly normal to inspection  HENT: normal cephalic/atraumatic  NEURO: Normal strength and tone, mentation intact and speech normal  PSYCH: mentation appears normal, affect normal/bright    ASSESSMENT/PLAN:       ICD-10-CM    1. Primigravida of advanced maternal age in first trimester  O09.511 Urine Culture Aerobic Bacterial     *UA reflex to Microscopic     Urine Microscopic Exam     Hemoglobin A1c     Varicella Zoster Virus Antibody IgG     [facility] Referral - Pregnancy     HGB Eval Reflex to ELP or RBC Solubility     [facility] Screening[facility]      2. Supervision of high risk pregnancy in first trimester  O09.91 Urine Culture Aerobic Bacterial     *UA reflex to Microscopic     Urine Microscopic Exam     Hemoglobin A1c     Varicella Zoster Virus Antibody IgG     [facility] Referral - Pregnancy     HGB Eval Reflex to ELP or RBC Solubility     [facility] Carrier Screening[facility]      3. 9 weeks gestation of pregnancy  Z3A.09           38 year old , On 2024 patient is 9w1d weeks of pregnancy with NATHANIEL of 2025, by Last Menstrual Period, c/w US today.     Counseling given:   - consult for US for AMA patients: Yes as well as GC and NT US  Genetic Testing reviewed and discussed, patient desires NIPS and carrier screening. Handout provided, consents reviewed and signed.   -81mg aspirin to be started if 1 or more high risk factors or 2 or more moderate risk factors for preeclampsia present  Moderate risk factors: nulliparity, age 35 or older    COUNSELING  Instructed on use of triage nurse line and contacting the on call CNM after hours in an emergency.   Symptoms of N&V and fatigue usually start to resolve around 12-16 weeks   Reviewed CNM philosophy, call schedule for labor and delivery, and [facility] for delivery  1st OB handout given outlining appointment spacing and CNM information  Reviewed exercise and  nutrition  Recommend to gain 20-25 pounds with her pregnancy.  Discussed OTC medications. OB med list given  Encouraged patient to take PNV's/DHA  Travel precautions discussed, no air travel after 36 weeks and COVID recommendations discussed  Will notify patient with lab results when available  81mg aspirin 1 x day at bedtime to be started at 12-16 weeks  MFM referral placed today d/t history of AMA, will be age 39 at delivery  1st OB labs and genetic screening labs to be drawn in 1 wk, can make lab only appt  PE deferred today per request, has appt with primary care provider for PE and thyroid labs next week.  Levothyroxine to be managed by PCP  Due for pap, discussed if not done by primary care provider could do pap at next visit      F/U to be addressed next visit:  1st OB labs, genetic labs, M referral, does she need pap?    Will return to the clinic in 4 weeks for her next routine prenatal check.  Will call to be seen sooner if problems arise.    50 minutes spent by me on the date of the encounter doing chart review, patient visit, and documentation     Flower MYERS CNM, [facility]  [phone]

## 2024-09-12 NOTE — PATIENT INSTRUCTIONS
Thank you for coming to see the Midwives at the   [facility]!    Please take prenatal vitamin with DHA and vitamin D3 2,000-3,000 international unit(s) once daily during the entire pregnancy.    We will notify you about your labs that were drawn today once we get the results back.  If you have MyChart your lab results will be posted there.    Someone from the clinic will send you a [facility] message or call you personally with your results.    If you need any refills of medications please call your pharmacy and they will contact us.    If you have a medical emergency please call 911.    If you have any concerns about today's visit, wish to schedule another appointment, or have an urgent medical concern please call our office at [phone]. You can also make appointments through [facility].    After hours you may also call the clinic number above to be connected with [facility]'s after hours triage nurse.  The nurse can page the midwife on call if needed. There is always a midwife on call 24 hours a day.    Prenatal Care Recommendations:    Before 14 weeks: Dating ultrasound, genetic testing       This ultrasound helps us determine your dates accurately. Innatal (genetic screening test) can be drawn anytime after 10 weeks of gestation.    16 weeks: Optional genetic testing single AFP       This testing helps understand your baby's risk for some genetic abnormalities.    18-22 weeks:  Screening anatomy ultrasound       This testing will look for early growth abnormalities, placenta location, and may tell the baby's gender if you wish to find out.    24-27 weeks: One hour diabetes test (GCT) and complete blood count       This test helps identify diabetes of pregnancy or gestational diabetes.  We also look   at the iron in your blood and how well your blood clots.    28 - 36 weeks: Tetanus shot (Tdap)       This shot helps protect you and your baby from whooping cough.    36 weeks and later: Group B  Strep test (GBS)       This test helps predict if you need antibiotics in labor to prevent infection for your baby.    Anytime September to April:  Flu shot       This shot helps protect you and your family from the flu.  This is especially important during pregnancy.        The typical schedule after your first visit today you can expect:     Visit 2 - 12-16 weeks  Visit 3 - 20 weeks  Visit 4 - 24 weeks  Visit 5 - 28 weeks  Visit 6 - 30 weeks  Visit 7 - 32 weeks  Visit 8 - 34 weeks  Visit 9 - 36 weeks  Weekly after 36 weeks until delivery.        Any time during or after your pregnancy you may experience increased depression and/or mood changes.    We are here to support you. Please contact us if you are:  Feeling anxious  Overwhelmed or sad   Trouble sleeping  Crying uncontrollably  Trouble caring for yourself or baby.  Any thoughts of hurting yourself, your baby, or anyone else    If anything comes up between your visits or you have concerns please don't hesitate to contact us.    Secure access to your medical record:  Use [employer] (secure email communication and access to your chart) to send your primary care provider a message or make an appointment. Ask someone on your Team how to sign up for [employer]. To log on to [employer] or for more information in [employer] please visit the website at [email or web address].       Certified Nurse Midwife (CNM) Team    ALANA Diallo, ALANA MYERS, ALANA, [facility]-BC  Sonya Gotti DNP, APRN, ALANA Pizano DNP, APRDANIS, ALANA Mireles DNP, LUCIA, COLLEENM    Link to Midwifery Care website: [email or web address]      Again, thank you for choosing the midwives at [facility].  We are excited to be a part of your pregnancy! Please let us know how we can best partner with you to improve your and your family's health.   Learning About Pregnancy  Your Care Instructions     Your health  in the early weeks of your pregnancy is particularly important for your baby's health. Take good care of yourself. Anything you do that harms your body can also harm your baby.  Make sure to go to all of your doctor appointments. Regular checkups will help keep you and your baby healthy.  How can you care for yourself at home?  Diet    Choose healthy foods like fruits, vegetables, whole grains, lean proteins, and healthy fats.     Choose foods that are good sources of calcium, iron, and folate. You can try dairy products, dark leafy greens, fortified orange juice and cereals, almonds, broccoli, dried fruit, and beans.     Do not skip meals or go for many hours without eating. If you are nauseated, try to eat a small, healthy snack every 2 to 3 hours.     Avoid fish that are high in mercury. These include shark, swordfish, kianna mackerel, marlin, orange roughy, and bigeye tuna, as well as tilefish from the [facility].     It's okay to eat up to 8 to 12 ounces a week of fish that are low in mercury or up to 4 ounces a week of fish that have medium levels of mercury. Some fish that are low in mercury are salmon, shrimp, canned light tuna, cod, and tilapia. Some fish that have medium levels of mercury are halibut and white albacore tuna.     Drink plenty of fluids. If you have kidney, heart, or liver disease and have to limit fluids, talk with your doctor before you increase the amount of fluids you drink.     Limit caffeine to about 200 to 300 mg per day. On average, a cup of brewed coffee has around 80 to 100 mg of caffeine.     Do not drink alcohol, such as beer, wine, or hard liquor.     Take a multivitamin that contains at least 400 micrograms (mcg) of folic acid to help prevent birth defects. Fortified cereal and whole wheat bread are good additional sources of folic acid.     Increase the calcium in your diet. Try to drink a quart of skim milk each day. You may also take calcium supplements and choose foods  such as cheese and yogurt.   Lifestyle    Make sure you go to your follow-up appointments.     Get plenty of rest. You may be unusually tired while you are pregnant.     Get at least 30 minutes of exercise on most days of the week. Walking is a good choice. If you have not exercised in the past, start out slowly. Take several short walks each day.     Do not smoke. If you need help quitting, talk to your doctor about stop-smoking programs. These can increase your chances of quitting for good.     Do not touch cat feces or litter boxes. Also, wash your hands after you handle raw meat, and fully cook all meat before you eat it. Wear gloves when you work in the yard or garden, and wash your hands well when you are done. Cat feces, raw or undercooked meat, and contaminated dirt can cause an infection that may harm your baby or lead to a miscarriage.     Avoid things that can make your body too hot and may be harmful to your baby, such as a hot tub or sauna. Or talk with your doctor before doing anything that raises your body temperature. Your doctor can tell you if it's safe.     Avoid chemical fumes, paint fumes, or poisons.     Do not use illegal drugs, marijuana, or alcohol.   Medicines    Review all of your medicines with your doctor. Some of your routine medicines may need to be changed to protect your baby.     Use acetaminophen (Tylenol) to relieve minor problems, such as a mild headache or backache or a mild fever with cold symptoms. Do not use nonsteroidal anti-inflammatory drugs (NSAIDs), such as ibuprofen (Advil, Motrin) or naproxen (Aleve), unless your doctor says it is okay.     Do not take two or more pain medicines at the same time unless the doctor told you to. Many pain medicines have acetaminophen, which is Tylenol. Too much acetaminophen (Tylenol) can be harmful.     Take your medicines exactly as prescribed. Call your doctor if you think you are having a problem with your medicine.   To manage  "morning sickness    Keep food in your stomach, but not too much at once. Try eating five or six small meals a day instead of three large meals.     For nausea when you wake up, eat a small snack, such as a couple of crackers or pretzels, before rising. Allow a few minutes for your stomach to settle before you slowly get up.     Try to avoid smells and foods that make you feel nauseated. High-fat or greasy foods, milk, and coffee may make nausea worse. Some foods that may be easier to tolerate include cold, spicy, sour, and salty foods.     Drink enough fluids. Water and other caffeine-free drinks are good choices.     Take your prenatal vitamins at night on a full stomach.     Try foods and drinks made with venita. Venita may help with nausea.     Get lots of rest. Morning sickness may be worse when you are tired.     Talk to your doctor about over-the-counter products, such as vitamin B6 or doxylamine, to help relieve symptoms.     Try a P6 acupressure wrist band. These anti-nausea wristbands help some people.   Follow-up care is a key part of your treatment and safety. Be sure to make and go to all appointments, and call your doctor if you are having problems. It's also a good idea to know your test results and keep a list of the medicines you take.  Where can you learn more?  Go to https://www.[employer].net/patiented  Enter E868 in the search box to learn more about \"Learning About Pregnancy.\"  Current as of: July 10, 2023               Content Version: 14.0    3897-6167 [employer].   Care instructions adapted under license by your healthcare professional. If you have questions about a medical condition or this instruction, always ask your healthcare professional. [employer] disclaims any warranty or liability for your use of this information.      Weeks 6 to 10 of Your Pregnancy: Care Instructions  During these weeks of pregnancy, your body goes through many changes. You may start to " "feel different, both in your body and your emotions. Each pregnancy is different, so there's no \"right\" way to feel. These early weeks are a time to make healthy choices for you and your pregnancy.    Take a daily prenatal vitamin. Choose one with folic acid in it.    Avoid alcohol, tobacco, and drugs (including marijuana). If you need help quitting, talk to your doctor.    Drink plenty of liquids.  Be sure to drink enough water. And limit sodas, other sweetened drinks, and caffeine.     Choose foods that are good sources of calcium, iron, and folate.  You can try dairy products, dark leafy greens, fortified orange juice and cereals, almonds, broccoli, dried fruit, and beans.     Avoid foods that may be harmful.  Don't eat raw meat, deli meat, raw seafood, or raw eggs. Avoid soft cheese and unpasteurized dairy, like Brie and blue cheese. And don't eat fish that contains a lot of mercury, like shark and swordfish.     Don't touch tremaine litter or cat poop.  They can cause an infection that could be harmful during pregnancy.     Avoid things that can make your body too hot.  For example, avoid hot tubs and saunas.     Soothe morning sickness.  Try eating 5 or 6 small meals a day, getting some fresh air, or using wolfgang to control symptoms.     Ask your doctor about flu and COVID-19 shots.  Getting them can help protect against infection.   Follow-up care is a key part of your treatment and safety. Be sure to make and go to all appointments, and call your doctor if you are having problems. It's also a good idea to know your test results and keep a list of the medicines you take.  Where can you learn more?  Go to https://www.[employer]am[employer].net/patiented  Enter G112 in the search box to learn more about \"Weeks 6 to 10 of Your Pregnancy: Care Instructions.\"  Current as of: July 10, 2023               Content Version: 14.0    2809-5006 [employer].   Care instructions adapted under license by your healthcare " professional. If you have questions about a medical condition or this instruction, always ask your healthcare professional. [employer] disclaims any warranty or liability for your use of this information.         Pregnancy: Managing Morning Sickness (01:48)  Your health professional recommends that you watch this short online health video.  Learn how to manage morning sickness during pregnancy.   Purpose:  Goal: Learn how to manage morning sickness during pregnancy.    Watch: Scan the QR code or visit the link to view video       https://hwi.se/[email or web address]  Current as of: July 10, 2023  Content Version: 14.1 2006-2024 [employer].   Care instructions adapted under license by your healthcare professional. If you have questions about a medical condition or this instruction, always ask your healthcare professional. [employer] disclaims any warranty or liability for your use of this information.    Pregnancy and Heartburn: Care Instructions  Overview     Heartburn is a common problem during pregnancy.  Heartburn happens when stomach acid backs up into the tube that carries food to the stomach. This tube is called the esophagus. Early in pregnancy, heartburn is caused by hormone changes that slow down digestion. Later on, it's also caused by the large uterus pushing up on the stomach.  Even though you can't fix the cause, there are things you can do to get relief. Treating heartburn during pregnancy focuses first on making lifestyle changes, like changing what and how you eat, and on taking medicines.  Heartburn usually improves or goes away after childbirth.  Follow-up care is a key part of your treatment and safety. Be sure to make and go to all appointments, and call your doctor if you are having problems. It's also a good idea to know your test results and keep a list of the medicines you take.  How can you care for yourself at home?  Eat small, frequent meals.  Avoid  "foods that make your symptoms worse, such as chocolate, peppermint, and spicy foods. Avoid drinks with caffeine, such as coffee, tea, and sodas.  Avoid bending over or lying down after meals.  Take a short walk after you eat.  If heartburn is a problem at night, do not eat for 2 hours before bedtime.  Take antacids like Mylanta, Maalox, Rolaids, or Tums. Do not take antacids that have sodium bicarbonate, magnesium trisilicate, or aspirin. Be careful when you take over-the-counter antacid medicines. Many of these medicines have aspirin in them. While you are pregnant, do not take aspirin or medicines that contain aspirin unless your doctor says it is okay.  If you're not getting relief, talk to your doctor. You may be able to take a stronger acid-reducing medicine.  When should you call for help?   Call your doctor now or seek immediate medical care if:    You have new or worse belly pain.     You are vomiting.   Watch closely for changes in your health, and be sure to contact your doctor if:    You have new or worse symptoms of reflux.     You are losing weight.     You have trouble or pain swallowing.     You do not get better as expected.   Where can you learn more?  Go to https://www.[email or web address]net/patiented  Enter U946 in the search box to learn more about \"Pregnancy and Heartburn: Care Instructions.\"  Current as of: July 10, 2023  Content Version: 14.1 2006-2024 [employer].   Care instructions adapted under license by your healthcare professional. If you have questions about a medical condition or this instruction, always ask your healthcare professional. [employer] disclaims any warranty or liability for your use of this information.    Learning About Fetal Ultrasound Results  What is a fetal ultrasound?     Fetal ultrasound is a test that lets your doctor see an image of your baby. Your doctor learns information about your baby from this picture. You may find out, for example, " if you are having a boy or a girl. But the main reason you have this test is to get information about your baby's health.  (You may hear your baby called a fetus. This is a common medical term for a baby that's growing in the mother's uterus.)  What kind of information can you learn from this test?  The findings of an ultrasound fall into two categories, normal and abnormal.  Normal  The fetus is the right size for its age.  The placenta is the expected size and does not cover the cervix.  There is enough amniotic fluid in the uterus.  No birth defects can be seen.  Abnormal  The fetus is small or large for its age.  The placenta covers the cervix.  There is too much or too little amniotic fluid in the uterus.  The fetus may have a birth defect.  What does an abnormal result mean?  Abnormal seems to imply that something is wrong with your baby. But what it means is that the test has shown something the doctor wants to take a closer look at.  And that's what happens next. Your doctor will talk to you about what further test or tests you may need.  What do the results mean?  Some of the things your doctor may see on an abnormal ultrasound include:  Echogenic bowel.  The bowel looks very bright on the screen. This could mean that there's blood in the bowel. Or it could mean that something is blocking the small bowel.  Increased nuchal translucency.  The ultrasound measures the thickness at the back of the baby's neck. An increase in thickness is sometimes an early sign of Down syndrome.  Increased or decreased amniotic fluid.  The doctor will look for a reason for the level of amniotic fluid and will watch the pregnancy closely as it progresses.  Large ventricles.  Ventricles in the brain look larger than they should. Your doctor may take a closer look at the brain.  Renal pyelectasis/hydronephrosis.  The ultrasound measures the fluid around the kidney. If there is more fluid than expected, there is a chance of urinary  "tract or kidney problems.  Short long bones.  The ultrasound measures certain arm and leg bones. A long bone (humerus or femur) that is shorter than average could be a sign of Down syndrome.  Subchorionic hemorrhage.  An ultrasound can show bleeding under one of the membranes that surrounds the fetus. Some women don't have symptoms of bleeding. The ultrasound can find this problem when women are not bleeding from their vagina. Women who have this condition have a slightly higher chance of miscarriage.  What do you do now?  Take a deep breath, and let it out. Keep in mind that an abnormal finding on an ultrasound, after it's coupled with more information, may:  Turn out to be nothing.  Turn out to be something mild that won't affect the baby.  Turn out to be something more serious. But if this happens, early diagnosis helps you and your doctor plan treatment options sooner rather than later.  Your medical team is there for you. So are your family and friends. Ask questions, and get the help and support you need.  Follow-up care is a key part of your treatment and safety. Be sure to make and go to all appointments, and call your doctor if you are having problems. It's also a good idea to know your test results and keep a list of the medicines you take.  Where can you learn more?  Go to https://www.[employer]a.net/patiented  Enter K451 in the search box to learn more about \"Learning About Fetal Ultrasound Results.\"  Current as of: July 10, 2023  Content Version: 14.1 2006-2024 [employer].   Care instructions adapted under license by your healthcare professional. If you have questions about a medical condition or this instruction, always ask your healthcare professional. [employer] disclaims any warranty or liability for your use of this information.    Learning About Prenatal Visits  Overview     Regular prenatal visits are very important during any pregnancy. These quick office visits may " seem simple and routine. But they can help you have a safe and healthy pregnancy. Your doctor is watching for problems that can only be found through regular checkups. The visits also give you and your doctor time to build a good relationship.  After your first visit, you will most likely start on a schedule of monthly visits. In your third trimester, the visits will get more frequent. Based on your health, your age, and if you've had a normal, full-term pregnancy before, your doctor may want to see you more or less often.  At different times in your pregnancy, you will have exams and tests. Some are routine. Others are done only when there is a chance of a problem. Everything healthy you do for your body helps you have a healthy pregnancy. Rest when you need it. Eat well, drink plenty of water, and exercise regularly.  What happens during a prenatal visit?  You will have blood pressure checks, along with urine tests. You also may have blood tests. If you need to go to the bathroom while waiting for the doctor, tell the nurse. You will be given a sample cup so your urine can be tested.  You will be weighed and have your belly measured.  Your doctor may listen to the fetal heartbeat with a special device.  At about 24 weeks, and possibly earlier in your pregnancy, your doctor will check your blood sugar (glucose tolerance test) for diabetes that can occur during pregnancy. This is gestational diabetes, which can be harmful.  You will have tests to check for infections that could harm your . These include group B streptococcus and hepatitis B.  Your doctor may do ultrasounds to check for problems. This also checks the position of the fetus. An ultrasound uses sound waves to produce a picture of the fetus.  You may get your vaccines updated.  Your doctor may ask you questions to check for signs of anxiety or depression. Tell your doctor if you feel sad, anxious, or hopeless for more than a few days.  You may  "have other tests at any time during your pregnancy.  Use your visits to discuss with your doctor any concerns you have.  How can you care for yourself at home?  Get plenty of rest.  Try to exercise every day, if your doctor says it is okay. If you have not exercised in the past, start out slowly. For example, you can take short walks each day.  Choose healthy foods, such as fruits, vegetables, whole grains, lean proteins, low-fat dairy, and healthy fats.  Drink plenty of fluids. Cut down on drinks with caffeine, such as coffee, tea, and cola. If you have kidney, heart, or liver disease and have to limit fluids, talk with your doctor before you increase the amount of fluids you drink.  Try to avoid chemical fumes, paint fumes, and poisons.  If you smoke, vape, or use alcohol, marijuana, or other drugs, quit or cut back as much as you can. Talk to your doctor if you need help quitting.  Review all of your medicines, including over-the-counter medicines and supplements, with your doctor. Some of your routine medicines may need to be changed. Do not stop or start taking any medicines without talking to your doctor first.  Follow-up care is a key part of your treatment and safety. Be sure to make and go to all appointments, and call your doctor if you are having problems. It's also a good idea to know your test results and keep a list of the medicines you take.  Where can you learn more?  Go to https://www.[employer].net/patiented  Enter J502 in the search box to learn more about \"Learning About Prenatal Visits.\"  Current as of: July 10, 2023               Content Version: 14.0    2411-3415 [employer].   Care instructions adapted under license by your healthcare professional. If you have questions about a medical condition or this instruction, always ask your healthcare professional. [employer] disclaims any warranty or liability for your use of this information.      Weeks 10 to 14 of Your " "Pregnancy: Care Instructions  It's now possible to hear the fetus's heartbeat with a special ultrasound device. And the fetus's organs are developing.    Decide about tests to check for birth defects. Think about your age, your chance of passing on a family disease, your need to know about any problems, and what you might do after you have the test results.    It's okay to exercise. Try activities such as walking or swimming. Check with your doctor before starting a new program.    You may feel more tired than usual.  Taking naps during the day may help.     You may feel emotional.  It might help to talk to someone.     You may have headaches.  Try lying down and putting a cool cloth over your forehead.     You can use acetaminophen (Tylenol) for pain relief.  Don't take any anti-inflammatory medicines (such as Advil, Motrin, Aleve), unless your doctor says it's okay.     You may feel a fullness or aching in your lower belly.  This can feel like the kind of cramps you might get before a period. A back rub may help.     You may need to urinate more.  Your growing uterus and changing hormones can affect your bladder.     You may feel sick to your stomach (morning sickness).  Try avoiding food and smells that make you feel sick.     Your breasts may feel different.  They may feel tender or get bigger. Your nipples may get darker. Try a bra that gives you good support.     Avoid alcohol, tobacco, and drugs (including marijuana).  If you need help quitting, talk to your doctor.     Take a daily prenatal vitamin.  Choose one with folic acid.   Follow-up care is a key part of your treatment and safety. Be sure to make and go to all appointments, and call your doctor if you are having problems. It's also a good idea to know your test results and keep a list of the medicines you take.  Where can you learn more?  Go to https://www.healthwise.net/patiented  Enter E090 in the search box to learn more about \"Weeks 10 to 14 of " "Your Pregnancy: Care Instructions.\"  Current as of: July 10, 2023               Content Version: 14.0    4955-1255 [employer].   Care instructions adapted under license by your healthcare professional. If you have questions about a medical condition or this instruction, always ask your healthcare professional. [employer] disclaims any warranty or liability for your use of this information.        Nutrition During Pregnancy: Care Instructions  Overview     Healthy eating when you are pregnant is important for you and your baby. It can help you feel well and have a successful pregnancy and delivery. During pregnancy your nutrition needs increase. Even if you have excellent eating habits, your doctor may recommend a multivitamin to make sure you get enough iron and folic acid.  You may wonder how much weight you should gain. In general, if you were at a healthy weight before you became pregnant, then you should gain between 25 and 35 pounds. If you were overweight before pregnancy, then you'll likely be advised to gain 15 to 25 pounds. If you were underweight before pregnancy, then you'll probably be advised to gain 28 to 40 pounds. Your doctor will work with you to set a weight goal that is right for you. Gaining a healthy amount of weight helps you have a healthy baby.  Follow-up care is a key part of your treatment and safety. Be sure to make and go to all appointments, and call your doctor if you are having problems. It's also a good idea to know your test results and keep a list of the medicines you take.  How can you care for yourself at home?  Eat plenty of fruits and vegetables. Include a variety of orange, yellow, and leafy dark-green vegetables every day.  Choose whole-grain bread, cereal, and pasta. Good choices include whole wheat bread, whole wheat pasta, brown rice, and oatmeal.  Get 4 or more servings of milk and milk products each day. Good choices include nonfat or low-fat " milk, yogurt, and cheese. If you cannot eat milk products, you can get calcium from calcium-fortified products such as orange juice, soy milk, and tofu. Other non-milk sources of calcium include leafy green vegetables, such as broccoli, kale, mustard greens, turnip greens, bok rafaela, and brussels sprouts.  If you eat meat, pick lower-fat types. Good choices include lean cuts of meat and chicken or turkey without the skin.  Avoid fish that are high in mercury. These include shark, swordfish, kianna mackerel, marlin, orange roughy, and bigeye tuna, as well as tilefish from the [facility].  It's okay to eat up to 8 to 12 ounces a week of fish that are low in mercury or up to 4 ounces a week of fish that have medium levels of mercury. Some fish that are low in mercury are salmon, shrimp, canned light tuna, cod, and tilapia. Some fish that have medium levels of mercury are halibut and white albacore tuna.  For more advice about eating fish, you can visit the U.S. Food and Drug Administration (FDA) or U.S. Environmental Protection Agency (EPA) website.  Heat lunch meats (such as turkey, ham, or bologna) to 165 F before you eat them. This reduces your risk of getting sick from a kind of bacteria that can be found in lunch meats.  Do not eat unpasteurized soft cheeses, such as brie, feta, fresh mozzarella, and blue cheese. They have a bacteria that could harm your baby.  Limit caffeine to about 200 to 300 mg per day. On average, a cup of brewed coffee has around 80 to 100 mg of caffeine.  Do not drink any alcohol. No amount of alcohol has been found to be safe during pregnancy.  Do not diet or try to lose weight. For example, do not follow a low-carbohydrate diet. If you are overweight at the start of your pregnancy, your doctor will work with you to manage your weight gain.  Tell your doctor about all vitamins and supplements you take.  When should you call for help?  Watch closely for changes in your health, and be sure  "to contact your doctor if you have any problems.  Where can you learn more?  Go to https://www.[employer].net/patiented  Enter Y785 in the search box to learn more about \"Nutrition During Pregnancy: Care Instructions.\"  Current as of: September 20, 2023               Content Version: 14.0    8663-9200 [employer].   Care instructions adapted under license by your healthcare professional. If you have questions about a medical condition or this instruction, always ask your healthcare professional. [employer] disclaims any warranty or liability for your use of this information.      "

## 2024-09-13 ENCOUNTER — TRANSCRIBE ORDERS (OUTPATIENT)
Dept: MATERNAL FETAL MEDICINE | Facility: CLINIC | Age: 39
End: 2024-09-13
Payer: COMMERCIAL

## 2024-09-13 DIAGNOSIS — O26.90 PREGNANCY RELATED CONDITION, ANTEPARTUM: Primary | ICD-10-CM

## 2024-09-13 LAB — BACTERIA UR CULT: NORMAL

## 2024-09-17 ENCOUNTER — LAB (OUTPATIENT)
Dept: LAB | Facility: CLINIC | Age: 39
End: 2024-09-17
Payer: COMMERCIAL

## 2024-09-17 DIAGNOSIS — E03.9 HYPOTHYROIDISM, UNSPECIFIED TYPE: ICD-10-CM

## 2024-09-17 DIAGNOSIS — Z13.1 SCREENING FOR DIABETES MELLITUS: ICD-10-CM

## 2024-09-17 DIAGNOSIS — E78.5 HYPERLIPIDEMIA LDL GOAL <100: ICD-10-CM

## 2024-09-17 LAB
ALBUMIN SERPL BCG-MCNC: 4.2 G/DL (ref 3.5–5.2)
ALP SERPL-CCNC: 52 U/L (ref 40–150)
ALT SERPL W P-5'-P-CCNC: 15 U/L (ref 0–50)
ANION GAP SERPL CALCULATED.3IONS-SCNC: 10 MMOL/L (ref 7–15)
AST SERPL W P-5'-P-CCNC: 16 U/L (ref 0–45)
BILIRUB SERPL-MCNC: 0.4 MG/DL
BUN SERPL-MCNC: 6.3 MG/DL (ref 6–20)
CALCIUM SERPL-MCNC: 9.2 MG/DL (ref 8.8–10.4)
CHLORIDE SERPL-SCNC: 105 MMOL/L (ref 98–107)
CHOLEST SERPL-MCNC: 207 MG/DL
CREAT SERPL-MCNC: 0.6 MG/DL (ref 0.51–0.95)
EGFRCR SERPLBLD CKD-EPI 2021: >90 ML/MIN/1.73M2
FASTING STATUS PATIENT QL REPORTED: YES
FASTING STATUS PATIENT QL REPORTED: YES
GLUCOSE SERPL-MCNC: 88 MG/DL (ref 70–99)
HCO3 SERPL-SCNC: 21 MMOL/L (ref 22–29)
HDLC SERPL-MCNC: 55 MG/DL
LDLC SERPL CALC-MCNC: 133 MG/DL
NONHDLC SERPL-MCNC: 152 MG/DL
POTASSIUM SERPL-SCNC: 4.1 MMOL/L (ref 3.4–5.3)
PROT SERPL-MCNC: 6.9 G/DL (ref 6.4–8.3)
SODIUM SERPL-SCNC: 136 MMOL/L (ref 135–145)
TRIGL SERPL-MCNC: 93 MG/DL
TSH SERPL DL<=0.005 MIU/L-ACNC: 3.72 UIU/ML (ref 0.3–4.2)

## 2024-09-17 PROCEDURE — 36415 COLL VENOUS BLD VENIPUNCTURE: CPT

## 2024-09-17 PROCEDURE — 80053 COMPREHEN METABOLIC PANEL: CPT

## 2024-09-17 PROCEDURE — 84443 ASSAY THYROID STIM HORMONE: CPT

## 2024-09-17 PROCEDURE — 80061 LIPID PANEL: CPT

## 2024-09-19 LAB
ABO/RH(D): NORMAL
ANTIBODY SCREEN: NEGATIVE
SPECIMEN EXPIRATION DATE: NORMAL

## 2024-09-20 ENCOUNTER — LAB (OUTPATIENT)
Dept: LAB | Facility: CLINIC | Age: 39
End: 2024-09-20
Payer: COMMERCIAL

## 2024-09-20 ENCOUNTER — OFFICE VISIT (OUTPATIENT)
Dept: INTERNAL MEDICINE | Facility: CLINIC | Age: 39
End: 2024-09-20
Payer: COMMERCIAL

## 2024-09-20 VITALS
DIASTOLIC BLOOD PRESSURE: 82 MMHG | OXYGEN SATURATION: 99 % | BODY MASS INDEX: 30.82 KG/M2 | SYSTOLIC BLOOD PRESSURE: 118 MMHG | HEIGHT: 62 IN | HEART RATE: 90 BPM | WEIGHT: 167.5 LBS | TEMPERATURE: 97.6 F

## 2024-09-20 DIAGNOSIS — O09.511 PRIMIGRAVIDA OF ADVANCED MATERNAL AGE IN FIRST TRIMESTER: ICD-10-CM

## 2024-09-20 DIAGNOSIS — E03.9 HYPOTHYROIDISM, UNSPECIFIED TYPE: ICD-10-CM

## 2024-09-20 DIAGNOSIS — Z00.01 ENCOUNTER FOR ROUTINE ADULT MEDICAL EXAM WITH ABNORMAL FINDINGS: Primary | ICD-10-CM

## 2024-09-20 DIAGNOSIS — O09.91 SUPERVISION OF HIGH RISK PREGNANCY IN FIRST TRIMESTER: ICD-10-CM

## 2024-09-20 DIAGNOSIS — Z13.79 GENETIC SCREENING: ICD-10-CM

## 2024-09-20 DIAGNOSIS — E78.5 HYPERLIPIDEMIA LDL GOAL <100: ICD-10-CM

## 2024-09-20 LAB
ERYTHROCYTE [DISTWIDTH] IN BLOOD BY AUTOMATED COUNT: 12.7 % (ref 10–15)
EST. AVERAGE GLUCOSE BLD GHB EST-MCNC: 100 MG/DL
HBA1C MFR BLD: 5.1 % (ref 0–5.6)
HCT VFR BLD AUTO: 38.8 % (ref 35–47)
HGB BLD-MCNC: 12.6 G/DL (ref 11.7–15.7)
HIV 1+2 AB+HIV1 P24 AG SERPL QL IA: NONREACTIVE
MCH RBC QN AUTO: 26.8 PG (ref 26.5–33)
MCHC RBC AUTO-ENTMCNC: 32.5 G/DL (ref 31.5–36.5)
MCV RBC AUTO: 82 FL (ref 78–100)
PLATELET # BLD AUTO: 357 10E3/UL (ref 150–450)
RBC # BLD AUTO: 4.71 10E6/UL (ref 3.8–5.2)
RUBV IGG SERPL QL IA: 0.84 INDEX
RUBV IGG SERPL QL IA: NORMAL
T PALLIDUM AB SER QL: NONREACTIVE
VZV IGG SER QL IA: 498.9 INDEX
VZV IGG SER QL IA: POSITIVE
WBC # BLD AUTO: 11.8 10E3/UL (ref 4–11)

## 2024-09-20 PROCEDURE — 87340 HEPATITIS B SURFACE AG IA: CPT

## 2024-09-20 PROCEDURE — 86762 RUBELLA ANTIBODY: CPT

## 2024-09-20 PROCEDURE — 87389 HIV-1 AG W/HIV-1&-2 AB AG IA: CPT

## 2024-09-20 PROCEDURE — 86787 VARICELLA-ZOSTER ANTIBODY: CPT

## 2024-09-20 PROCEDURE — 83021 HEMOGLOBIN CHROMOTOGRAPHY: CPT | Mod: 90

## 2024-09-20 PROCEDURE — 85027 COMPLETE CBC AUTOMATED: CPT

## 2024-09-20 PROCEDURE — 86900 BLOOD TYPING SEROLOGIC ABO: CPT

## 2024-09-20 PROCEDURE — 99395 PREV VISIT EST AGE 18-39: CPT | Performed by: INTERNAL MEDICINE

## 2024-09-20 PROCEDURE — 83020 HEMOGLOBIN ELECTROPHORESIS: CPT | Mod: 90

## 2024-09-20 PROCEDURE — 99214 OFFICE O/P EST MOD 30 MIN: CPT | Mod: 25 | Performed by: INTERNAL MEDICINE

## 2024-09-20 PROCEDURE — 86850 RBC ANTIBODY SCREEN: CPT

## 2024-09-20 PROCEDURE — 85660 RBC SICKLE CELL TEST: CPT | Mod: 90

## 2024-09-20 PROCEDURE — 86901 BLOOD TYPING SEROLOGIC RH(D): CPT

## 2024-09-20 PROCEDURE — 36415 COLL VENOUS BLD VENIPUNCTURE: CPT

## 2024-09-20 PROCEDURE — 83036 HEMOGLOBIN GLYCOSYLATED A1C: CPT

## 2024-09-20 PROCEDURE — 99000 SPECIMEN HANDLING OFFICE-LAB: CPT

## 2024-09-20 PROCEDURE — 86803 HEPATITIS C AB TEST: CPT

## 2024-09-20 PROCEDURE — 86780 TREPONEMA PALLIDUM: CPT

## 2024-09-20 RX ORDER — LEVOTHYROXINE SODIUM 150 UG/1
150 TABLET ORAL DAILY
Qty: 90 TABLET | Refills: 0 | Status: CANCELLED | OUTPATIENT
Start: 2024-09-20

## 2024-09-20 RX ORDER — LEVOTHYROXINE SODIUM 88 UG/1
TABLET ORAL
Qty: 90 TABLET | Refills: 3 | Status: SHIPPED | OUTPATIENT
Start: 2024-09-20

## 2024-09-20 RX ORDER — CYANOCOBALAMIN (VITAMIN B-12) 2500 MCG
2500 TABLET, SUBLINGUAL SUBLINGUAL DAILY
COMMUNITY
Start: 2024-09-20

## 2024-09-20 RX ORDER — LEVOTHYROXINE SODIUM 75 UG/1
TABLET ORAL
Qty: 30 TABLET | Refills: 11 | Status: SHIPPED | OUTPATIENT
Start: 2024-09-20

## 2024-09-20 NOTE — PATIENT INSTRUCTIONS
Increase thyroid medication  to  Levothyroxine 88mcg tab   daily in addition to  Levothyroxine 75mcg daily so total  daily   dose = 163mcg  Call  [phone] or use [employer] to schedule a future lab appointment  non-fasting in 6 weeks.   Referral to [facility] re: future thyroid management during pregnancy  as needed. Call for appt   I would recommend an updated covid vaccination and an influenza/flu vaccination prior to travel to [place] at the clinic or any pharmacy.  Talk  with  OB GYN today to get their opinion   Healthcare  Directive discussed and given copy.   Patient to complete and return to clinic  Reduce saturated fats (red meats, fried and processed foods) in your diet and increase the amount of color on your plate with fruits and vegetables.  Will recheck  cholesterol in 1 year   Pt was informed regarding extra E&M billing for management of new or established medical issues not related to today's wellness/screening visit

## 2024-09-20 NOTE — PROGRESS NOTES
Preventive Care Visit  [facility]  Michael Arenas MD, Internal Medicine  Sep 20, 2024       ASSESSMENT:    1. Encounter for routine adult medical exam with abnormal findings   Repeat screening labs 1 year. Recommended pt have flu and covid vaccines but pt wishes to first talk with her OBGYN provider re: vaccinations before receiving. Pap UTD (due 2026). Start  mammograms in 1 year  - Comprehensive metabolic panel; Future  - CBC with platelets; Future    2. Hypothyroidism, unspecified type   TSH normal but discussed generally will need  higher levothyroxine dose during pregnancy. As pt already 10 weeks into pregnancy and TSH still high normal, will just raise dose mildly and recheck 6 weeks for now. Will also have pt see Endo during pregnancy to help manage  thyroid state with their expertise  - Adult Endocrinology  Referral; Future  - levothyroxine (SYNTHROID/LEVOTHROID) 75 MCG tablet; Take  1 tab  daily in addition to  Levothyroxine 88mcg daily so total  daily dose = 163mcg  Dispense: 30 tablet; Refill: 11  - levothyroxine (SYNTHROID/LEVOTHROID) 88 MCG tablet; Take  1 tab  daily in addition to  Levothyroxine 75mcg daily so total  daily dose = 163mcg  Dispense: 90 tablet; Refill: 3  - TSH with free T4 reflex; Future  - TSH with free T4 reflex; Future    3. Primigravida of advanced maternal age in first trimester  10 weeks pregnant and states pregnancy has gone well co far. Denies N/V. Continue management per OBGYN    4. Hyperlipidemia LDL goal <100   Lipids elevated. 10 year CAD risk too low for meds and pt currently pregnant also. Will reduce saturated fat intake and repeat labs 1 year  - Lipid panel reflex to direct LDL Fasting; Future      PLAN:  Increase thyroid medication  to  Levothyroxine 88mcg tab   daily in addition to  Levothyroxine 75mcg daily so total  daily   dose = 163mcg  Call  [phone] or use [employer] to schedule a future lab appointment  non-fasting in 6 weeks.    Referral to [facility] re: future thyroid management during pregnancy  as needed. Call for appt   I would recommend an updated covid vaccination and an influenza/flu vaccination prior to travel to [place] at the clinic or any pharmacy.  Talk  with  OB GYN today to get their opinion  Healthcare  Directive discussed and given copy.   Patient to complete and return to clinic  Reduce saturated fats (red meats, fried and processed foods) in your diet and increase the amount of color on your plate with fruits and vegetables.  Will recheck  cholesterol in 1 year   Pt was informed regarding extra E&M billing for management of new or established medical issues not related to today's wellness/screening visit           Sohail Cuevas is a 38 year old, presenting for the following:  Physical   And follow-up re: hypothyroidism       Health Care Directive  Patient does not have a Health Care Directive or Living Will: Discussed advance care planning with patient; information given to patient to review.    HPI         9/16/2024   General Health   How would you rate your overall physical health? Good   Feel stress (tense, anxious, or unable to sleep) Only a little      (!) STRESS CONCERN      9/16/2024   Nutrition   Three or more servings of calcium each day? Yes   Diet: Regular (no restrictions)   How many servings of fruit and vegetables per day? (!) 2-3   How many sweetened beverages each day? 0-1            9/16/2024   Exercise   Days per week of moderate/strenous exercise 3 days   Average minutes spent exercising at this level 20 min            9/16/2024   Social Factors   Frequency of gathering with friends or relatives Once a week   Worry food won't last until get money to buy more No   Food not last or not have enough money for food? No   Do you have housing? (Housing is defined as stable permanent housing and does not include staying ouside in a car, in a tent, in an abandoned building, in an overnight  shelter, or couch-surfing.) Yes   Are you worried about losing your housing? No   Lack of transportation? No   Unable to get utilities (heat,electricity)? No            9/16/2024   Dental   Dentist two times every year? Yes            9/16/2024   TB Screening   Were you born outside of the [place]? No            Today's PHQ-2 Score:       9/19/2024     7:24 PM   PHQ-2 ( 1999 [employer])   Q1: Little interest or pleasure in doing things 0   Q2: Feeling down, depressed or hopeless 0   PHQ-2 Score 0   Q1: Little interest or pleasure in doing things Not at all   Q2: Feeling down, depressed or hopeless Not at all   PHQ-2 Score 0           9/16/2024   Substance Use   Alcohol more than 3/day or more than 7/wk Not Applicable   Do you use any other substances recreationally? No        Social History     Tobacco Use    Smoking status: Never    Smokeless tobacco: Never   Vaping Use    Vaping status: Never Used   Substance Use Topics    Alcohol use: Yes     Comment: 6-10 per week    Drug use: No           Mammogram Screening - Patient under 40 years of age: Routine Mammogram Screening not recommended.         9/16/2024   STI Screening   New sexual partner(s) since last STI/HIV test? No        History of abnormal Pap smear: No - age 30- 64 PAP with HPV every 5 years recommended        Latest Ref Rng & Units 1/15/2021     2:45 PM 1/15/2021     2:44 PM 10/21/2016     3:30 PM   PAP / HPV   PAP (Historical)   NIL     HPV 16 DNA NEG^Negative Negative   Negative    HPV 18 DNA NEG^Negative Negative   Negative    Other HR HPV NEG^Negative Negative   Negative            9/16/2024   Contraception/Family Planning   Questions about contraception or family planning No       Pt's past medical history, family history, habits, medications and allergies were reviewed with the patient today.   Most recent lab results reviewed with pt. Problem list and histories reviewed & adjusted, as indicated.    Component      Latest Ref Rng 9/17/2024  9:52 AM    Sodium      135 - 145 mmol/L 136    Potassium      3.4 - 5.3 mmol/L 4.1    Carbon Dioxide (CO2)      22 - 29 mmol/L 21 (L)    Anion Gap      7 - 15 mmol/L 10    Urea Nitrogen      6.0 - 20.0 mg/dL 6.3    Creatinine      0.51 - 0.95 mg/dL 0.60    GFR Estimate      >60 mL/min/1.73m2 >90    Calcium      8.8 - 10.4 mg/dL 9.2    Chloride      98 - 107 mmol/L 105    Glucose      70 - 99 mg/dL 88    Alkaline Phosphatase      40 - 150 U/L 52    AST      0 - 45 U/L 16    ALT      0 - 50 U/L 15    Protein Total      6.4 - 8.3 g/dL 6.9    Albumin      3.5 - 5.2 g/dL 4.2    Bilirubin Total      <=1.2 mg/dL 0.4    Patient Fasting? Yes    Patient Fasting? Yes    Cholesterol      <200 mg/dL 207 (H)    Triglycerides      <150 mg/dL 93    HDL Cholesterol      >=50 mg/dL 55    LDL Cholesterol Calculated      <100 mg/dL 133 (H)    Non HDL Cholesterol      <130 mg/dL 152 (H)    TSH      0.30 - 4.20 uIU/mL 3.72       Legend:  (L) Low  (H) High    Review of Systems  CONSTITUTIONAL: NEGATIVE for fever, chills.   Weight down  3 pounds from 18 mos ago though pt states she has been gaining weight more recently with pregnancy  INTEGUMENTARY/SKIN: NEGATIVE for worrisome rashes, moles or lesions  EYES: NEGATIVE for vision changes or irritation  ENT/MOUTH: NEGATIVE for ear, mouth and throat problems  RESP: NEGATIVE for significant cough or SOB  BREAST: NEGATIVE for masses, tenderness or discharge  CV: NEGATIVE for chest pain, palpitations or peripheral edema  GI: NEGATIVE for nausea, abdominal pain, heartburn, or change in bowel habits  : NEGATIVE for frequency, dysuria, or hematuria. Pt 10 weeks pregnant. States has appt with OBGYN later today  MUSCULOSKELETAL: NEGATIVE for significant arthralgias or myalgia  NEURO: NEGATIVE for weakness, dizziness or paresthesias  ENDOCRINE: NEGATIVE for temperature intolerance. On Levothyroxine for hypothyroidism. Energy OK. No cold intolerance  HEME: NEGATIVE for bleeding problems  PSYCHIATRIC: NEGATIVE  "for changes in mood or affect     Objective    Exam  /82   Pulse 90   Temp 97.6  F (36.4  C) (Temporal)   Ht 1.575 m (5' 2\")   Wt 76 kg (167 lb 8 oz)   LMP 07/10/2024   SpO2 99%   BMI 30.64 kg/m     Estimated body mass index is 30.64 kg/m  as calculated from the following:    Height as of this encounter: 1.575 m (5' 2\").    Weight as of this encounter: 76 kg (167 lb 8 oz).    Physical Exam  General appearance -  alert, no distress  Skin - No rashes or lesions.  Head - normocephalic, atraumatic  Eyes - PEACE, EOMI, fundi exam with nondilated pupils negative.  Ears - External ears normal. Canals clear. TM's normal.  Nose/Sinuses - Nares normal. Septum midline. Mucosa normal. No drainage or sinus tenderness.  Oropharynx - No erythema, no adenopathy, no exudates.  Neck - Supple without adenopathy or thyromegaly. No bruits.  Lungs - Clear to auscultation without wheezes/rhonchi.  Heart - Regular rate and rhythm without murmurs, clicks, or gallops.  Nodes - No supraclavicular, axillary, or inguinal adenopathy palpable.  Breasts - deferred  Abdomen - Abdomen gravid, soft, non-tender. BS normal. No masses or hepatosplenomegaly palpable. No bruits.  Extremities -No cyanosis, clubbing or edema.    Musculoskeletal - Spine ROM normal. Muscular strength intact.   Peripheral pulses - radial=4/4, femoral=4/4, posterior tibial=4/4, dorsalis pedis=4/4,  Neuro - Gait normal. Reflexes normal and symmetric. Sensation grossly WNL.  Genital/Rectal - deferred          Signed Electronically by: Michael Arenas MD              "

## 2024-09-21 PROBLEM — E78.5 HYPERLIPIDEMIA LDL GOAL <100: Status: ACTIVE | Noted: 2024-09-21

## 2024-09-21 PROBLEM — O09.899 RUBELLA NON-IMMUNE STATUS, ANTEPARTUM: Status: ACTIVE | Noted: 2024-09-21

## 2024-09-21 PROBLEM — Z28.39 RUBELLA NON-IMMUNE STATUS, ANTEPARTUM: Status: ACTIVE | Noted: 2024-09-21

## 2024-09-21 LAB
HBV SURFACE AG SERPL QL IA: NONREACTIVE
HCV AB SERPL QL IA: NONREACTIVE

## 2024-09-21 NOTE — RESULT ENCOUNTER NOTE
Informed via mychart, normal new OB labs, B+, non immune to rubella, recommend MMR vaccine PP.    Monica Millan, APRN, CNM

## 2024-09-24 LAB
HGB A1 MFR BLD: 96.9 %
HGB A2 MFR BLD: 2.8 %
HGB C MFR BLD: 0 %
HGB E MFR BLD: 0 %
HGB F MFR BLD: 0.3 %
HGB FRACT BLD ELPH-IMP: NORMAL
HGB OTHER MFR BLD: 0 %
HGB S BLD QL SOLY: NORMAL
HGB S MFR BLD: 0 %
PATH INTERP BLD-IMP: NORMAL

## 2024-09-30 LAB — SCANNED LAB RESULT: NORMAL

## 2024-10-03 LAB — SCANNED LAB RESULT: ABNORMAL

## 2024-10-04 ENCOUNTER — PRE VISIT (OUTPATIENT)
Dept: MATERNAL FETAL MEDICINE | Facility: CLINIC | Age: 39
End: 2024-10-04
Payer: COMMERCIAL

## 2024-10-11 ENCOUNTER — OFFICE VISIT (OUTPATIENT)
Dept: MATERNAL FETAL MEDICINE | Facility: CLINIC | Age: 39
End: 2024-10-11
Attending: STUDENT IN AN ORGANIZED HEALTH CARE EDUCATION/TRAINING PROGRAM
Payer: COMMERCIAL

## 2024-10-11 ENCOUNTER — HOSPITAL ENCOUNTER (OUTPATIENT)
Dept: ULTRASOUND IMAGING | Facility: CLINIC | Age: 39
Discharge: HOME | End: 2024-10-11
Attending: STUDENT IN AN ORGANIZED HEALTH CARE EDUCATION/TRAINING PROGRAM
Payer: COMMERCIAL

## 2024-10-11 DIAGNOSIS — O09.91 SUPERVISION OF HIGH RISK PREGNANCY IN FIRST TRIMESTER: Primary | ICD-10-CM

## 2024-10-11 DIAGNOSIS — O09.511 PRIMIGRAVIDA OF ADVANCED MATERNAL AGE IN FIRST TRIMESTER: Primary | ICD-10-CM

## 2024-10-11 DIAGNOSIS — O26.90 PREGNANCY RELATED CONDITION, ANTEPARTUM: ICD-10-CM

## 2024-10-11 PROCEDURE — 76801 OB US < 14 WKS SINGLE FETUS: CPT

## 2024-10-11 PROCEDURE — 76801 OB US < 14 WKS SINGLE FETUS: CPT | Mod: 26 | Performed by: STUDENT IN AN ORGANIZED HEALTH CARE EDUCATION/TRAINING PROGRAM

## 2024-10-11 PROCEDURE — 96040 HC GENETIC COUNSELING, EACH 30 MINUTES: CPT

## 2024-10-11 NOTE — PROGRESS NOTES
"Please see \"Imaging\" tab under \"Chart Review\" for details of today's visit.    Emily Sauer    "

## 2024-10-11 NOTE — PROGRESS NOTES
[facility]  Genetic Counseling Consult    Patient:  Faith Crow YOB: 1985   Date of Service:  10/11/24   MRN: [id number]    Faith was seen at the [facility] for genetic consultation. The indication for genetic counseling is advanced maternal age and consent for carrier screening for their partner due to her carrier status. The patient was accompanied to this visit by their partner, Humberto.    IMPRESSION/ PLAN   1. Faith had genetic screening earlier in this pregnancy. Their non-invasive prenatal test was screen negative or low risk for screened conditions     2. During today's [facility] visit, Faith had a genetic counseling session only. Screening and diagnostic testing was discussed and declined.  Humberto consented for carrier screening today. Results will be back in 2/3 weeks. We will contact Faith with the results once available. A consent to communicate was signed today. She provided verbal permission for the results to be left on her voicemail.     3. Faith had a nuchal translucency ultrasound today. Please see the ultrasound report for further details.    4. Further recommendations include a fetal anatomy level II ultrasound with [facility]. The upcoming ultrasound has been scheduled for 2024.    PREGNANCY HISTORY   /Parity:       This is Jacqui's first pregnancy    CURRENT PREGNANCY   Current Age: 39 year old     Age at Delivery: 39 year old    NATHANIEL: 2025, by Last Menstrual Period                                     Gestational Age: 13w2d    This pregnancy is a single gestation.     [facility] no bleeding, complications, illnesses, fever or exposure concerns with this pregnancy.     MEDICAL HISTORY   Faith s reported medical history is not expected to impact pregnancy management or risks to fetal development.          FAMILY HISTORY   A three-generation pedigree was obtained  "today and is scanned under the \"Media\" tab in Epic. The family history was reported by Faith and their partner.    The following significant findings were reported today:   Mother has colitis, scoliosis, arthritis and migraines   Father had a heart attack  Paternal cousin with a cleft lip. We reviewed that orofacial clefts are a relatively common birth defect, occurring in approximately 1 in 1,000 live births. They can be isolated or part of a broader underlying genetic syndrome. When isolated they are thought to be multifactorial, resulting from a combination of genetic and environmental factors. Based on the information provided, it appears that this was likely an isolated birth defect in Jacqui's cousin.  In general, the recurrence risk is likely increased for first and second degree relatives, however, for third degree relatives the risk is likely equal to general population risk. We reviewed the benefits and limitations of ultrasound as a screening tool for orofacial clefting in the pregnancy. The couple was encouraged to share this family history information with their pediatrician.   The father of the pregnancy, Humberto (40yrs), is healthy  Humberto's nephew has autism  Autism spectrum disorders (ASD) are characterized by abnormalities in language and social cognition. Individuals with ASD typically have difficulty with communication and interaction with others, intellectual disability, restricted interests, and repetitive behaviors. The cause of ASD is currently unknown. In less than 1% of all individuals with ASD, it is a feature of a certain genetic condition such as Down syndrome, fragile X syndrome, or Rett syndrome. However, in most cases the cause may be multifactorial which means a combination of genetic and environmental factors. The genetic causes or predispositions are being researched and many have been suggested. If an individual is affected with ASD there is an estimated 2-18.7% chance for a " sibling of the individual to also be affected. Estimates for second degree relatives are currently unknown but could be elevated from the general population risk.   Humberto's brother has celiac disease   Humberto's father passed away from a heart attack. Humberto reports that this was likely due to his alcoholism  Humberto's maternal grandmother had ovarian cancer over the age 50.     Otherwise, the reported family history is unremarkable for multiple miscarriages, stillbirths, intellectual disabilities, known genetic conditions, and consanguinity.       RISK ASSESSMENT FOR CHROMOSOME CONDITIONS   We explained that the risk for fetal chromosome abnormalities increases with maternal age. We discussed specific features of common chromosome abnormalities, including Down syndrome, trisomy 13, trisomy 18, and sex chromosome trisomies.    At age 39 at midtrimester, the risk to have a baby with Down syndrome is 1 in 74.   At age 39 at midtrimester, the risk to have a baby with any chromosome abnormality is 1 in 40.     Faith had genetic screening earlier in this pregnancy. Their non-invasive prenatal test was screen negative or low risk for screened conditions     Non-invasive prenatal testing (NIPT) results  Maternal plasma cell-free DNA testing  Screens for fetal trisomy 21, trisomy 13, trisomy 18, and sex chromosome aneuploidy  First trimester ultrasound with nuchal translucency and nasal bone assessment was within normal limits.  Faith had NIPT earlier in pregnancy; we reviewed the results today, which are low risk.  The NIPT did include sex chromosome aneuploidies and the result was low risk. The report included predicted sex but the patient does not want to know this information.  Given the accuracy of this test, these results greatly decrease the chance for certain fetal chromosome abnormalities  We discussed the limitations of normal NIPT results  Maternal serum AFP only to screen for open neural tube defects (after 15  weeks) is not yet available due to early gestation but could be done after 15 weeks.     RISK ASSESSMENT FOR INHERITED CONDITIONS   We discussed that every pregnancy has a chance to have an inherited single-gene condition, even when there is no family history of that condition. In fact, approximately 90% of couples at an increased reproductive risk for an inherited condition have no family history of that condition. The average person may be a carrier for 5-10 different genetic variants that can increase the chance for their pregnancies to have that condition. We discussed autosomal recessive conditions and X-linked conditions. Autosomal recessive conditions happen when a mutation has been inherited from the egg and sperm and include conditions like cystic fibrosis, thalassemia, hearing loss, spinal muscular atrophy, and more. X-linked conditions happen when a mutation has been inherited from the egg and include conditions like fragile X syndrome.     We reviewed that when both biological parents carry a harmful genetic change in a gene associated with autosomal recessive inheritance, each of their pregnancies has a 1 in 4 (25%) chance to be affected by that condition. With x-linked conditions, the specific risk generally depends on the chromosomal sex of the fetus, with XY individuals (generally male) being most severely affected.     [place] screening was reviewed. [place]  Screening    The patient had previous carrier screening for 14 conditions through [facility]. A copy of the report was available for review today. The patient's partner did not complete carrier screening.     Alpha thalassemia (HBA1 deletion: aa/a-):  Alpha thalassemia is a blood condition in which the body does not produce enough hemoglobin alpha (alpha globin). Healthy individuals typically have four copies of hemoglobin alpha.   Individuals who have a deletion of one alpha globin copy are called silent carriers of alpha  thalassemia.   Risks for the offspring of a silent carrier depend on the partner's alpha globin status. The most significant presentation would be if the partner has alpha thalassemia trait (only two copies of alpha globin) in cis. In this case, the couple would have a 25% chance to have a child with hemoglobin H disease. Individuals with this condition have significant anemia and hemolysis, and have specific medical management recommendations.    Humberto elected to have carrier screening for hemoglobinopathies today.     We discussed that carrier screening can have implications for other family members and that couples are encouraged to share positive results with siblings and other family members of reproductive age. Additionally, even if there is not a high reproductive risk for a condition, it is possible that carrier status can be passed on to future generations.  GENETIC TESTING OPTIONS   Genetic testing during a pregnancy includes screening and diagnostic procedures.      Screening tests are non-invasive which means no risk to the pregnancy and includes ultrasounds and blood work. The benefits and limitations of screening were reviewed. Screening tests provide a risk assessment (chance) specific to the pregnancy for certain fetal chromosome abnormalities but cannot definitively diagnose or exclude a fetal chromosome abnormality. Follow-up genetic counseling and consideration of diagnostic testing is recommended with any abnormal screening result. Diagnostic testing during a pregnancy is more certain and can test for more conditions. However, the tests do have a risk of miscarriage that requires careful consideration. These tests can detect fetal chromosome abnormalities with greater than 99% certainty. Results can be compromised by maternal cell contamination or mosaicism and are limited by the resolution of current genetic testing technology.     There is no screening or diagnostic test that detects all forms  of birth defects or intellectual disability.     We discussed the following screening options:  Carrier screening  Risk assessment for certain autosomal recessive and x-linked conditions. These conditions are generally infantile- or childhood-onset conditions.   Can be done any time during the pregnancy or prior to pregnancy.  Can screen for over 500 different genetic conditions.  Is not intended to diagnosis a condition in the carrier parent.    Even with negative results, a residual risk for screened conditions remains.      We discussed the following ultrasound options:  Nuchal translucency (NT) ultrasound  Ultrasound between [id number]-34l8t that includes nuchal translucency measurement and nasal bone assessments  Nuchal translucency refers to the space at the back of the neck where fluid builds up. All babies at this stage have fluid and there is only concern if there is too much fluid  Nasal bone refers to the small bone in the nose. There is concern for conditions like Down syndrome if the bone cannot be seen at all  This ultrasound can be done as part of first trimester screening, at the same time as another screen (NIPT), at the same time as a CVS, or if the patients does not want genetic screening.  Markers on ultrasound detects about 70% of pregnancies with aneuploidy  Abnormalities on NT ultrasound can also increase the risk for a birth defect, like a heart defect  Comprehensive level II ultrasound (Fetal Anatomy Ultrasound)  Ultrasound done between 18-20 weeks gestation  Screens for major birth defects and markers for aneuploidy (like trisomy 21 and trisomy 18)  Includes looking at the fetus/baby's growth, heart, organs (stomach, kidneys), placenta, and amniotic fluid    We discussed the following diagnostic options:   Chorionic villus sampling (CVS)  Invasive diagnostic procedure done between 10w0d and 13w6d  The procedure collects a small sample from the placenta for the purpose of chromosomal testing  and/or other genetic testing  Diagnostic result; more than 99% sensitivity for fetal chromosome abnormalities  Cannot screen for open neural tube defects, maternal serum AFP after 15 weeks is recommended  Amniocentesis  Invasive diagnostic procedure done after 15 weeks gestation  The procedure collects a small sample of amniotic fluid for the purpose of chromosomal testing and/or other genetic testing  Diagnostic result; more than 99% sensitivity for fetal chromosome abnormalities  Testing for AFP in the amniotic fluid can test for open neural tube defects      It was a pleasure to be involved with \[facility] [place] [facility]\"" care. Face-to-face time of the meeting was 45 minutes.    MER WARE MS, [facility]  Genetic Counselor  [facility]  Maternal Fetal Medicine  Office: [phone]   [facility]: [phone]   Fax: [phone]  [facility]

## 2024-10-11 NOTE — NURSING NOTE
Patient presents to [facility] for GC/NT at 13w2d due to AMA. Denies LOF, vaginal bleeding, cramping/contractions. SBAR given to CLEOPATRA MD, see their note in Epic.

## 2024-10-27 ENCOUNTER — HEALTH MAINTENANCE LETTER (OUTPATIENT)
Age: 39
End: 2024-10-27

## 2024-10-29 ENCOUNTER — TELEPHONE (OUTPATIENT)
Dept: MATERNAL FETAL MEDICINE | Facility: CLINIC | Age: 39
End: 2024-10-29
Payer: COMMERCIAL

## 2024-10-29 DIAGNOSIS — O09.92 SUPERVISION OF HIGH RISK PREGNANCY IN SECOND TRIMESTER: Primary | ICD-10-CM

## 2024-10-29 NOTE — TELEPHONE ENCOUNTER
October 29, 2024      I called Jacqui to review Humberto's carrier screening results per the established plan. Humberto signed a consent to communicate.    Humberto screened negative for alpha thalassemia, beta thalassemia and sickle cell disease. This significantly reduces the chance for Erika to have a child with any of these conditions. Reviewed that all of Jacqui's children will have  a 50% chance of being a silent alpha thalassemia carrier.    All of Jacqui's questions were answered.     MER WARE MS, [facility]  Genetic Counselor  [facility]  Maternal Fetal Medicine  Office: [phone]   [facility]: [phone]   Fax: [phone]  [facility]

## 2024-11-18 ENCOUNTER — OFFICE VISIT (OUTPATIENT)
Dept: MATERNAL FETAL MEDICINE | Facility: CLINIC | Age: 39
End: 2024-11-18
Attending: OBSTETRICS & GYNECOLOGY
Payer: COMMERCIAL

## 2024-11-18 ENCOUNTER — HOSPITAL ENCOUNTER (OUTPATIENT)
Dept: ULTRASOUND IMAGING | Facility: CLINIC | Age: 39
Discharge: HOME | End: 2024-11-18
Attending: OBSTETRICS & GYNECOLOGY
Payer: COMMERCIAL

## 2024-11-18 DIAGNOSIS — O09.512 AMA (ADVANCED MATERNAL AGE) PRIMIGRAVIDA 35+, SECOND TRIMESTER: Primary | ICD-10-CM

## 2024-11-18 DIAGNOSIS — O26.90 PREGNANCY RELATED CONDITION, ANTEPARTUM: ICD-10-CM

## 2024-11-18 PROCEDURE — 76817 TRANSVAGINAL US OBSTETRIC: CPT

## 2024-11-18 PROCEDURE — 76811 OB US DETAILED SNGL FETUS: CPT | Mod: 26 | Performed by: OBSTETRICS & GYNECOLOGY

## 2024-11-18 PROCEDURE — 76817 TRANSVAGINAL US OBSTETRIC: CPT | Mod: 26 | Performed by: OBSTETRICS & GYNECOLOGY

## 2024-11-18 PROCEDURE — 76811 OB US DETAILED SNGL FETUS: CPT

## 2024-11-18 NOTE — NURSING NOTE
Patient presents to [facility] for L2 US at 18w5d due to AMA. Denies LOF, vaginal bleeding, cramping/contractions. SBAR given to CLEOPATRA HINOJOSA, see their note in Epic.

## 2024-11-18 NOTE — PROGRESS NOTES
"Please see \"imaging\" tab under \"Chart Review\" for details of today's US at the [facility].    Shad Brizuela MD  Maternal-Fetal Medicine    "

## 2024-11-19 PROBLEM — O44.42 LOW-LYING PLACENTA IN SECOND TRIMESTER: Status: ACTIVE | Noted: 2024-11-19

## 2024-12-04 ENCOUNTER — PRENATAL OFFICE VISIT (OUTPATIENT)
Dept: MIDWIFE SERVICES | Facility: CLINIC | Age: 39
End: 2024-12-04
Payer: COMMERCIAL

## 2024-12-04 VITALS — DIASTOLIC BLOOD PRESSURE: 72 MMHG | SYSTOLIC BLOOD PRESSURE: 114 MMHG | BODY MASS INDEX: 31.28 KG/M2 | WEIGHT: 171 LBS

## 2024-12-04 DIAGNOSIS — Z13.0 SCREENING, ANEMIA, DEFICIENCY, IRON: ICD-10-CM

## 2024-12-04 DIAGNOSIS — Z13.1 SCREENING FOR DIABETES MELLITUS: ICD-10-CM

## 2024-12-04 DIAGNOSIS — Z3A.21 21 WEEKS GESTATION OF PREGNANCY: ICD-10-CM

## 2024-12-04 DIAGNOSIS — O44.42 LOW-LYING PLACENTA IN SECOND TRIMESTER: ICD-10-CM

## 2024-12-04 DIAGNOSIS — O09.92 SUPERVISION OF HIGH RISK PREGNANCY IN SECOND TRIMESTER: Primary | ICD-10-CM

## 2024-12-04 DIAGNOSIS — O09.512 PRIMIGRAVIDA OF ADVANCED MATERNAL AGE IN SECOND TRIMESTER: ICD-10-CM

## 2024-12-04 PROCEDURE — 99207 PR PRENATAL VISIT: CPT | Performed by: ADVANCED PRACTICE MIDWIFE

## 2024-12-04 RX ORDER — LEVOTHYROXINE SODIUM 175 UG/1
175 TABLET ORAL DAILY
COMMUNITY
Start: 2024-11-07

## 2024-12-04 NOTE — PATIENT INSTRUCTIONS
"Weeks 22 to 26 of Your Pregnancy: Care Instructions  Your baby's lungs are getting ready for breathing. Your baby may respond to your voice. Your baby likely turns less, and kicks or jerks more. Jerking may mean that your baby has hiccups.    Think about taking childbirth classes. And start to think about whether you want to have pain medicine during labor.   At your next doctor visit, you may be tested for anemia and for high blood sugar that first occurs during pregnancy (gestational diabetes). These conditions can cause problems for you and your baby.         To ease discomfort, such as back pain   Change your position often. Try not to sit or stand for too long.  Get some exercise. Things like walking or stretching may help.  Try using a heating pad or cold pack.        To ease or reduce swelling in your feet, ankles, hands, and fingers   Take off your rings.  Avoid high-sodium foods, such as potato chips.  Prop up your feet, and sleep with pillows under your feet.  Try to avoid standing for long periods of time.  Do not wear tight shoes.  Wear support stockings.  Kegel exercises to prevent urine from leaking    Squeeze your muscles as if you were trying not to pass gas. Your belly, legs, and buttocks shouldn't move. Hold the squeeze for 3 seconds, then relax for 5 to 10 seconds.    Add 1 second each week until you can squeeze for 10 seconds. Repeat the exercise 10 times a session. Do 3 to 8 sessions a day. If these exercises cause you pain, stop doing them and talk with your doctor.  Follow-up care is a key part of your treatment and safety. Be sure to make and go to all appointments, and call your doctor if you are having problems. It's also a good idea to know your test results and keep a list of the medicines you take.  Where can you learn more?  Go to https://www.healthwise.net/patiented  Enter G264 in the search box to learn more about \"Weeks 22 to 26 of Your Pregnancy: Care Instructions.\"  Current as of: " July 10, 2023  Content Version: 14.2 2024 [facility] [email or web address].   Care instructions adapted under license by your healthcare professional. If you have questions about a medical condition or this instruction, always ask your healthcare professional. Healthwise, Incorporated disclaims any warranty or liability for your use of this information.    Round Ligament Pain: Care Instructions  Overview     Round ligament pain is a common pain during pregnancy. You may feel a sharp brief pain on one or both sides of your belly. It may go down into your groin. It's usually felt for the first time during the second trimester. This pain is a normal part of pregnancy.  Your uterus is supported by two ligaments that go from the top and sides of the uterus to the bones of the pelvis. These are the round ligaments. As your uterus grows, these ligaments stretch and tighten with your movements. This may be the cause of the pain. You may find that certain activities seem to cause pain. If you can, avoid those activities.  Your doctor can usually diagnose round ligament pain from your symptoms and an exam. If you have bleeding or other symptoms, your doctor may also do an imaging test, such as an ultrasound. Your doctor may suggest some things that can help the pain, such as rest and strengthening exercises.  Follow-up care is a key part of your treatment and safety. Be sure to make and go to all appointments, and call your doctor if you are having problems. It's also a good idea to know your test results and keep a list of the medicines you take.  How can you care for yourself at home?  If certain movements seem to trigger belly pain, see if you can avoid them or try moving more slowly so the ligaments don't stretch quickly.  Stay active. If your doctor says it's okay, try moderate exercise. You might try things like swimming, walking, or stretching. Ask your doctor about strengthening and stretching exercises that may help.  Try a  "heating pad or cold pack on the area. A warm bath or shower may also help.  Rest when you can.  Ask your doctor about taking acetaminophen for pain. Be safe with medicines. Read and follow all instructions on the label.  Try a belly support band. Some people find that these can help.  When should you call for help?   Call your doctor now or seek immediate medical care if:    You think you might be in labor.     You have new or worse pain.   Watch closely for changes in your health, and be sure to contact your doctor if you have any problems.  Where can you learn more?  Go to https://www.TheFriendMail.net/patiented  Enter R110 in the search box to learn more about \"Round Ligament Pain: Care Instructions.\"  Current as of: July 10, 2023  Content Version: 14.2 2024 [employer].   Care instructions adapted under license by your healthcare professional. If you have questions about a medical condition or this instruction, always ask your healthcare professional. Healthwise, [employer] disclaims any warranty or liability for your use of this information.    Leg and Ankle Edema: Care Instructions  Your Care Instructions  Swelling in the legs, ankles, and feet is called edema. It is common after you sit or stand for a while. Long plane flights or car rides often cause swelling in the legs and feet. You may also have swelling if you have to stand for long periods of time at your job. Problems with the veins in the legs (varicose veins) and changes in hormones can also cause swelling. Sometimes the swelling in the ankles and feet is caused by a more serious problem, such as heart failure, infection, blood clots, or liver or kidney disease.  Follow-up care is a key part of your treatment and safety. Be sure to make and go to all appointments, and call your doctor if you are having problems. It's also a good idea to know your test results and keep a list of the medicines you take.  How can you care for yourself at " "home?  If your doctor gave you medicine, take it as prescribed. Call your doctor if you think you are having a problem with your medicine.  Whenever you are resting, raise your legs up. Try to keep the swollen area higher than the level of your heart.  Take breaks from standing or sitting in one position.  Walk around to increase the blood flow in your lower legs.  Move your feet and ankles often while you stand, or tighten and relax your leg muscles.  Wear support stockings. Put them on in the morning, before swelling gets worse.  Eat a balanced diet. Lose weight if you need to.  Limit the amount of salt (sodium) in your diet. Salt holds fluid in the body and may increase swelling.  When should you call for help?   Call 911 anytime you think you may need emergency care. For example, call if:    You have symptoms of a blood clot in your lung (called a pulmonary embolism). These may include:  Sudden chest pain.  Trouble breathing.  Coughing up blood.   Call your doctor now or seek immediate medical care if:    You have signs of a blood clot, such as:  Pain in your calf, back of the knee, thigh, or groin.  Redness and swelling in your leg or groin.     You have symptoms of infection, such as:  Increased pain, swelling, warmth, or redness.  Red streaks or pus.  A fever.   Watch closely for changes in your health, and be sure to contact your doctor if:    Your swelling is getting worse.     You have new or worsening pain in your legs.     You do not get better as expected.   Where can you learn more?  Go to https://www.Rockbot.net/patiented  Enter N696 in the search box to learn more about \"Leg and Ankle Edema: Care Instructions.\"  Current as of: August 6, 2023  Content Version: 14.2 2024 "[employer].   Care instructions adapted under license by your healthcare professional. If you have questions about a medical condition or this instruction, always ask your healthcare professional. [employer] " Incorporated disclaims any warranty or liability for your use of this information.    Back Pain During Pregnancy: Care Instructions  Overview     Back pain has many possible causes. It is often caused by problems with muscles and ligaments in your back. The extra weight during pregnancy can put stress on your back. Moving, lifting, standing, sitting, or sleeping in an awkward way also can strain your back. Back pain can also be a sign of labor. Although it may hurt a lot, back pain often improves on its own. Use good home treatment, and take care not to stress your back.  Follow-up care is a key part of your treatment and safety. Be sure to make and go to all appointments, and call your doctor if you are having problems. It's also a good idea to know your test results and keep a list of the medicines you take.  How can you care for yourself at home?  Ask your doctor about taking acetaminophen (Tylenol) for pain. Do not take aspirin, ibuprofen (Advil, Motrin), or naproxen (Aleve).  Do not take two or more pain medicines at the same time unless the doctor told you to. Many pain medicines have acetaminophen, which is Tylenol. Too much acetaminophen (Tylenol) can be harmful.  Try heat or ice, whichever feels better. Apply it for 10 to 20 minutes at a time, several times a day. Put a thin cloth between the heat or ice and your skin. A warm bath or shower may also help.  Lie on your left side with your knees and hips bent and a pillow between your legs. This reduces stress on your back.  Try to avoid standing or sitting for too long or heavy lifting. If your job requires lots of standing, sitting, or heavy lifting, ask your employer if you can take short breaks or adjust your work activity. You can ask your doctor to write a note requesting these breaks or other adjustments.  Wear supportive, low-heeled shoes. Avoid flat or high-heeled shoes.  Try a belly support band. Supporting your belly can take the strain off your  "back.  Ask your doctor about how much exercise you can do. Regular exercise such as swimming, water aerobics, walking, or stretching can help with back pain.  Ask your doctor about exercises to stretch, strengthen, and relax your muscles. Your doctor may recommend physical therapy.  When should you call for help?   Call your doctor now or seek immediate medical care if:    You've been having regular contractions for an hour. This means that you've had at least 6 contractions within 1 hour, even after you change your position and drink fluids.     You have new numbness in your buttocks, genital or rectal areas, or legs.     You have a new loss of bowel or bladder control.     You have symptoms of a urinary tract infection. These may include:  Pain or burning when you urinate.  A frequent need to urinate without being able to pass much urine.  Pain in the flank, which is just below the rib cage and above the waist on either side of the back.  Blood in your urine.   Watch closely for changes in your health, and be sure to contact your doctor if:    You do not get better as expected.   Where can you learn more?  Go to https://www.Wallmob.net/patiented  Enter C696 in the search box to learn more about \"Back Pain During Pregnancy: Care Instructions.\"  Current as of: July 10, 2023  Content Version: 2024 [facility] [employer].   Care instructions adapted under license by your healthcare professional. If you have questions about a medical condition or this instruction, always ask your healthcare professional. Healthwise, Incorporated disclaims any warranty or liability for your use of this information.     Labor: Care Instructions  Overview      labor is the start of labor between 20 and 36 weeks of pregnancy. Most babies are born at 37 to 42 weeks of pregnancy. In labor, the uterus contracts to open the cervix. This is the first stage of childbirth.  labor can be caused by a problem with the " baby, the mother, or both. Often the cause is not known.  In some cases, doctors use medicines to try to delay labor until 34 or more weeks of pregnancy. By this time, a baby has grown enough so that problems are not likely. In some cases--such as with a serious infection--it is healthier for the baby to be born early. Your treatment will depend on how far along you are in your pregnancy and on your health and your baby's health.  Follow-up care is a key part of your treatment and safety. Be sure to make and go to all appointments, and call your doctor if you are having problems. It's also a good idea to know your test results and keep a list of the medicines you take.  How can you care for yourself at home?  If your doctor prescribed medicines, take them exactly as directed. Call your doctor if you think you are having a problem with your medicine.  Rest until your doctor advises you about activity.  Do not have sexual intercourse unless your doctor says it is safe.  Use sanitary pads if you have vaginal bleeding. Using pads makes it easier to monitor your bleeding.  Do not smoke or allow others to smoke around you. If you need help quitting, talk to your doctor about stop-smoking programs and medicines. These can increase your chances of quitting for good.  When should you call for help?   Call 911  anytime you think you may need emergency care. For example, call if:    You passed out (lost consciousness).     You have a seizure.     You have severe vaginal bleeding.     You have severe pain in your belly or pelvis that doesn't get better between contractions.     You have had fluid gushing or leaking from your vagina and you know or think the umbilical cord is bulging into your vagina. If this happens, immediately get down on your knees so your rear end (buttocks) is higher than your head. This will decrease the pressure on the cord until help arrives.   Call your doctor now or seek immediate medical care if:     "You have signs of preeclampsia, such as:  Sudden swelling of your face, hands, or feet.  New vision problems (such as dimness, blurring, or seeing spots).  A severe headache.     You have any vaginal bleeding.     You have belly pain or cramping.     You have a fever.     You have had regular contractions (with or without pain) for an hour. This means that you have 6 or more within 1 hour after you change your position and drink fluids.     You have a sudden release of fluid from the vagina.     You have low back pain or pelvic pressure that does not go away.     You notice that your baby has stopped moving or is moving much less than normal.   Watch closely for changes in your health, and be sure to contact your doctor if you have any problems.  Where can you learn more?  Go to https://www.[employer].net/patiented  Enter Q400 in the search box to learn more about \" Labor: Care Instructions.\"  Current as of: July 10, 2023  Content Version: 2024 [facility] [employer].   Care instructions adapted under license by your healthcare professional. If you have questions about a medical condition or this instruction, always ask your healthcare professional. Healthwise, Incorporated disclaims any warranty or liability for your use of this information.    "

## 2024-12-04 NOTE — PROGRESS NOTES
21w0d  Feels well overall. Here alone today.   Fetal movement: present  Denies loss of fluid/vb/contractions    Anatomy ultrasound completed at [facility] on 11/18/24.  Low-lying placenta identified: Plan for repeat ultrasound at 32 weeks.   Plan for BPPs weekly starting at 36 weeks.  Ultrasounds ordered today.     GCT visit between 24-28 weeks  Round ligament pain and comfort measures reviewed  Return to clinic 4 weeks    Sonya Gotti, EDWAR, APRN, CNM

## 2024-12-04 NOTE — NURSING NOTE
"Chief Complaint   Patient presents with    Prenatal Care     21 weeks 0 days, No c/o VB, cramping. Feeling some FM. Has several questions she would like to discuss- has questions about using massage gun on lower back, need for pelvic and breast exam during pregnancy, what to expect for her next appointments and ultrasounds    Imm/Inj     Discuss need for covid and flu shot         Initial /72   Wt 77.6 kg (171 lb)   LMP 07/10/2024   BMI 31.28 kg/m   Estimated body mass index is 31.28 kg/m  as calculated from the following:    Height as of 24: 1.575 m (5' 2\").    Weight as of this encounter: 77.6 kg (171 lb).  BP completed using cuff size: regular    Questioned patient about current smoking habits.  Pt. has never smoked.          The following HM Due: NONE  Montrell Rosales CMA               "

## 2025-01-08 ENCOUNTER — LAB (OUTPATIENT)
Dept: LAB | Facility: CLINIC | Age: 40
End: 2025-01-08
Payer: COMMERCIAL

## 2025-01-08 DIAGNOSIS — R73.02 IMPAIRED GLUCOSE TOLERANCE: ICD-10-CM

## 2025-01-08 LAB
GESTATIONAL GTT 1 HR POST DOSE: 194 MG/DL (ref 60–179)
GESTATIONAL GTT 2 HR POST DOSE: 147 MG/DL (ref 60–154)
GESTATIONAL GTT 3 HR POST DOSE: 111 MG/DL (ref 60–139)
GLUCOSE P FAST SERPL-MCNC: 88 MG/DL (ref 60–94)

## 2025-01-08 PROCEDURE — 82951 GLUCOSE TOLERANCE TEST (GTT): CPT

## 2025-01-08 PROCEDURE — 82952 GTT-ADDED SAMPLES: CPT

## 2025-01-08 PROCEDURE — 36415 COLL VENOUS BLD VENIPUNCTURE: CPT

## 2025-01-28 PROBLEM — O09.513 PRIMIGRAVIDA OF ADVANCED MATERNAL AGE IN THIRD TRIMESTER: Status: ACTIVE | Noted: 2024-09-12

## 2025-01-28 NOTE — PROGRESS NOTES
29w0d  Feels well overall. Here with her father today.   Fetal movement: present  Denies loss of fluid/vb/contractions, signs and symptoms preeclampsia    Plans for childbirth education: Childbirth education though Summer at 35 weeks.     Tdap: Given    Reviewed PTL precautions, S&S of preeclampsia and fetal movement awareness, patient verbalizes understanding and what to report.    Return to clinic 2 weeks. Ultrasound on 2/19/25 for placental location.     Sonya Gotti, EDWAR, APRN, CNM

## 2025-01-28 NOTE — PATIENT INSTRUCTIONS
Childbirth Education    There are many options for childbirth education in the [place] area. Additionally, there are many online options in other areas in the [place], so please if you find great classes, please let us know!    [place]: [facility] is a boutique type center that is know for its prenatal/  yoga and community like setting. They offer great childbirth education. They are expensive, but people who go there love it. www.Odyssey Thera    Summer: Summer offers well-rounded education including preparing for childbirth, breastfeeding, and even baby CPR. www.Setred.Ixchelsis    BirthEd: Birth offers similar classes as [place] at similar prices. They focus more on Lamaze style childbirth techniques. www.birthedmn.com    Everyday Miracles: I cannot recommend this place enough!!! [employer] mission is to provide high quality prenatal/  education to all [facility] residents. They provide  support, prenatal yoga, childbirth and breastfeeding education. They are a nonprofit organization. For people with Medicaid health care plans, most services are free. But even if you have private insurance, the costs are very reasonable. Plus, the money is funneled back into amazing childbirth education for all! www.everyday-miracles.org    Childbirth Collective: [facility]ive offers free parent topic sessions that occur virtually. These sessions are not comprehensive but can give small doses of education. www.childbirthcollective.org    Weeks 26 to 30 of Your Pregnancy: Care Instructions  You're starting your last trimester. You'll probably feel your baby moving around more. Your back may ache as your body gets used to your baby's size and length. Take care of yourself, and pay attention to what your body needs.    Talk to your doctor about getting the Tdap shot. It will help protect your  against whooping cough (pertussis). Also ask your doctor about flu and COVID-19 shots if you haven't had  them yet. If your blood type is Rh negative, you may be given a shot of Rh immune globulin (such as RhoGAM). It can help prevent problems for your baby.   You may have Manuel-Ariza contractions. They are single or several strong contractions without a pattern. These are practice contractions but not the start of labor.   Be kind to yourself.       Take breaks when you're tired.  Change positions often. Don't sit for too long or stand for too long.  At work, rest during breaks if you can. If you don't get breaks, talk to your doctor about writing a letter to your employer to request them.  Avoid fumes, chemicals, and tobacco smoke.  Be sexual if you want to.       You may be interested in sex, or you may not. Everyone is different.  Sex is okay unless your doctor tells you not to.  Your belly can make it hard to find good positions for sex. Cut and Shoot and explore.  Watch for signs of  labor.        These signs include:  Menstrual-like cramps. Or you may have pain or pressure in your pelvis that happens in a pattern.  About 6 or more contractions in an hour (even after rest and a glass of water).  A low, dull backache that doesn't go away when you change positions.  An increase or change in vaginal discharge.  Light vaginal bleeding or spotting.  Your water breaking.  Know what to do if you think you are having contractions.       Drink 1 or 2 glasses of water.  Lie down on your left side for at least an hour.  While on your side, feel the top of your belly to see if it's tight.  Write down your contractions for an hour. Time how long it is from the start of one contraction to the start of the next.  Call your doctor if you have regular contractions.  Follow-up care is a key part of your treatment and safety. Be sure to make and go to all appointments, and call your doctor if you are having problems. It's also a good idea to know your test results and keep a list of the medicines you take.  Where can you learn  "more?  Go to https://www.[employer].net/patiented  Enter S999 in the search box to learn more about \"Weeks 26 to 30 of Your Pregnancy: Care Instructions.\"  Current as of: April 30, 2024  Content Version: 14.3    2024 [employer].   Care instructions adapted under license by your healthcare professional. If you have questions about a medical condition or this instruction, always ask your healthcare professional. [employer] disclaims any warranty or liability for your use of this information.    Counting Your Baby's Kicks: Care Instructions  Overview     Counting your baby's kicks is one way your doctor can tell that your baby is healthy. You will probably feel your baby move for the first time between 16 and 22 weeks. The movement may feel like flutters rather than kicks. Your baby may move more at certain times of the day. When you are active, you may notice less kicking than when you are resting. At your prenatal visits, your doctor will ask whether the baby is active.  In your last trimester, your doctor may ask you to count the number of times you feel your baby move.  Follow-up care is a key part of your treatment and safety. Be sure to make and go to all appointments, and call your doctor if you are having problems. It's also a good idea to know your test results and keep a list of the medicines you take.  How do you count fetal kicks?  A common method of checking your baby's movement is to note the length of time it takes to count 10 movements (such as kicks, flutters, or rolls).  Pick your baby's most active time of day to count. This may be any time from morning to evening.  If you don't feel 10 movements in an hour, have something to eat or drink and count for another hour. If you don't feel at least 10 movements in the 2-hour period, call your doctor.  Do not use an at-home Doppler heart monitor in place of counting fetal movements.  When should you call for help?   Call your doctor " "now or seek immediate medical care if:    You feel fewer than 10 movements in a 2-hour period.     You noticed that your baby has stopped moving or is moving less than normal.   Watch closely for changes in your health, and be sure to contact your doctor if you have any problems.  Where can you learn more?  Go to https://www.[employer].net/patiented  Enter U048 in the search box to learn more about \"Counting Your Baby's Kicks: Care Instructions.\"  Current as of: April 30, 2024  Content Version: 14.3    2024 [employer].   Care instructions adapted under license by your healthcare professional. If you have questions about a medical condition or this instruction, always ask your healthcare professional. [employer] disclaims any warranty or liability for your use of this information.    Weeks 30 to 32 of Your Pregnancy: Care Instructions  Your baby is growing more every day. Its eyes can open and close, and it may have hair on its head. Your baby may sleep 20 to 45 minutes at a time and is more active at certain times.    You should feel your baby move several times every day. Your baby now turns less and kicks more.   This is a good time to tour your hospital or birthing center. You may also want to find childcare if needed.         To ease heartburn   Avoid foods that make your symptoms worse, such as chocolate, spicy foods, and caffeine.  Avoid bending over or lying down after meals.  Do not eat for 2 hours before bedtime.  Take antacids like Tums, but don't take ones that have sodium bicarbonate, magnesium trisilicate, or aspirin.        To care for large, swollen veins (varicose veins)   Try to avoid standing for long periods of time.  Sit with your feet propped up.  Wear support hose.  Get some exercise every day, like walking or swimming.  Counting your baby's kicks  Your doctor may ask you to count your baby's movements, such as kicks, flutters, or rolls.    Find a quiet place, and get " "comfortable. Write down your start time. Count your baby's movements (except hiccups). When your baby has moved 10 times, you can stop counting. Write down how many minutes it took.   If an hour goes by and you don't feel 10 movements, have something to eat or drink. Count for another hour. If you don't feel at least 10 movements in the 2-hour period, call your doctor.   Follow-up care is a key part of your treatment and safety. Be sure to make and go to all appointments, and call your doctor if you are having problems. It's also a good idea to know your test results and keep a list of the medicines you take.  Where can you learn more?  Go to https://www.[employer].net/patiented  Enter X471 in the search box to learn more about \"Weeks 30 to 32 of Your Pregnancy: Care Instructions.\"  Current as of: April 30, 2024  Content Version: 14.3    2024 [employer].   Care instructions adapted under license by your healthcare professional. If you have questions about a medical condition or this instruction, always ask your healthcare professional. [employer] disclaims any warranty or liability for your use of this information.    Learning About Birth Control After Childbirth  Birth control is any method used to prevent pregnancy. If you have vaginal sex without birth control, you could get pregnant--even if you haven't started having periods again. You're less likely to get pregnant while breastfeeding, but it's still possible. Finding birth control that works for you can help avoid an unplanned pregnancy.  There are many kinds of birth control. Each has pros and cons. Find what works for you. Talk to your doctor if you've just given birth or are breastfeeding.    Long-acting reversible contraception (LARC). These are placed inside your body by a doctor. They can prevent pregnancy for years.  Examples include:  An implant (hormonal).  Copper intrauterine device (IUD).  Hormonal IUDs.    Short-acting " "hormonal methods. These release hormones. Examples include:  Combination birth control pills (\"the pill\").  Skin patches.  A vaginal ring.  A shot.  Mini-pills. Choose progestin-only options soon after giving birth.    Barrier methods. Use these every time you have vaginal sex.  Examples include:  External (male) condoms.  Internal (female) condoms.  Diaphragms.  Cervical caps.  Sponges.    Spermicides. These kill sperm or stop sperm from moving. They can be gels, creams, foams, films, or tablets. Use them before vaginal sex.  Examples include:  Nonoxynol-9.  pH regulator gel.    Permanent birth control (sterilization). This can be an option if you're sure that you don't want to get pregnant later.  Examples include:  Vasectomy.  Having tubes tied (tubal ligation).    Emergency contraception. This is a backup method. Use it if you didn't use birth control or your birth control method failed.  Examples include:  Copper and hormonal IUDs.  Emergency contraceptive pills.    Fertility awareness. You'll learn when you are most likely to become pregnant (are fertile). You can avoid vaginal sex at that time.  It's also called:  Natural family planning.  The rhythm method.    Breastfeeding. This is most effective when all of these are true:  Your baby is younger than 6 months old.  You're breastfeeding and not bottle-feeding at all.  You aren't having periods.  Follow-up care is a key part of your treatment and safety. Be sure to make and go to all appointments, and call your doctor if you are having problems. It's also a good idea to know your test results and keep a list of the medicines you take.  Where can you learn more?  Go to https://www.[employer]Span.net/patiented  Enter X408 in the search box to learn more about \"Learning About Birth Control After Childbirth.\"  Current as of: April 30, 2024  Content Version: 14.3    2024 [employer].   Care instructions adapted under license by your healthcare professional. " If you have questions about a medical condition or this instruction, always ask your healthcare professional. [employer] disclaims any warranty or liability for your use of this information.

## 2025-01-29 ENCOUNTER — PRENATAL OFFICE VISIT (OUTPATIENT)
Dept: MIDWIFE SERVICES | Facility: CLINIC | Age: 40
End: 2025-01-29
Payer: COMMERCIAL

## 2025-01-29 VITALS — SYSTOLIC BLOOD PRESSURE: 104 MMHG | BODY MASS INDEX: 31.86 KG/M2 | WEIGHT: 174.2 LBS | DIASTOLIC BLOOD PRESSURE: 80 MMHG

## 2025-01-29 DIAGNOSIS — O44.42 LOW-LYING PLACENTA IN SECOND TRIMESTER: ICD-10-CM

## 2025-01-29 DIAGNOSIS — Z29.13 NEED FOR RHOGAM DUE TO RH NEGATIVE MOTHER: ICD-10-CM

## 2025-01-29 DIAGNOSIS — Z3A.29 29 WEEKS GESTATION OF PREGNANCY: ICD-10-CM

## 2025-01-29 DIAGNOSIS — Z28.39 RUBELLA NON-IMMUNE STATUS, ANTEPARTUM: ICD-10-CM

## 2025-01-29 DIAGNOSIS — O09.93 SUPERVISION OF HIGH RISK PREGNANCY IN THIRD TRIMESTER: Primary | ICD-10-CM

## 2025-01-29 DIAGNOSIS — O09.899 RUBELLA NON-IMMUNE STATUS, ANTEPARTUM: ICD-10-CM

## 2025-01-29 DIAGNOSIS — Z23 NEED FOR TDAP VACCINATION: ICD-10-CM

## 2025-01-29 PROCEDURE — 90715 TDAP VACCINE 7 YRS/> IM: CPT | Performed by: ADVANCED PRACTICE MIDWIFE

## 2025-01-29 PROCEDURE — 90471 IMMUNIZATION ADMIN: CPT | Performed by: ADVANCED PRACTICE MIDWIFE

## 2025-01-29 PROCEDURE — 99207 PR PRENATAL VISIT: CPT | Performed by: ADVANCED PRACTICE MIDWIFE

## 2025-02-18 NOTE — PROGRESS NOTES
32w0d  Feels well overall. Here Humberto today.  US today: low-lying placenta resolved, EFW 70%, 4pv01bx, cephalic,  bpm, MVP 5.19cm   Fetal Movement: present   Denies loss of fluid/vb & s/sx of preeclampsia.   Contractions: none    Peds chosen: considering [facility], [facility], or [facility]. Handouts given   Reviewed Hep B, Vit K, and erythromycin   Discussed circumcision; advised to check insurance for hospital vs clinic  Plans to breastfeed Yes  Breastfeeding class planned Yes  Doing a labor skills class and all-in-one     Fetal kick counts/movements reviewed.  Reviewed danger signs, signs and symptoms PTL, and s/sx of preeclampsia.  Has appts with weekly BPPs starting at 36w scheduled out      Return to clinic 2 weeks    LUCIA Echevarria, COLLEENM

## 2025-02-18 NOTE — PATIENT INSTRUCTIONS
SIGNS OF  LABOR    Labor is  if it happens more than three weeks before your due date.    It can be hard to know if you are in labor, since the symptoms can be like the normal feelings of pregnancy.  Often, the only difference is the symptoms increase or they don't go away.     Signs of  labor can include:    Contractions which can feel like period cramps or gas pain.  You may feel it in the lower part of your abdomen, in your back, or as a pressure feeling in your bottom.  It is often regular, coming every 5 or 10 minutes, and  lasting about 30-60 seconds. Some contractions are normal during pregnancy (Lincoln jim contractions) but if you are feeling more than 5-6 in one hour that is NOT normal  If this occurs empty your bladder, then drink 2-3 glasses of water, eat a snack, and lay down on your left side. Put your hand on your abdomen to count the contractions.  If after one hour of resting you have still had 5-6 contractions call your clinic right away.    If you feel a pop, gush, or trickle of fluid it may mean that your bag of water has broken and you should contact the clinic     You may also experience loose stools and/or rectal pressure     Listen to your body, if something doesn't seem right please call us at the clinic    Risk Factors    Previous  delivery  Bacterial Vaginosis- if you notice a fishy smell to your discharge or experience vaginal itching/discomfort you should be evaluated for infection  Smoking  Drug abuse  Adolescent (teen) pregnancy or advanced maternal age (AMA) age 35 and over  Dehydration (this may not cause  labor but it can cause contractions)    If you think you are in  labor we may do some lab testing in the clinic or send you to the hospital for evaluation    Please call us if you are concerned you are in  labor.    [facility]  [phone]      PREECLAMPSIA SIGNS AND SYMPTOMS    Preeclampsia is a dangerous  "condition that some women develop in the second half of pregnancy. It can also begin after the baby is born.  Preeclampsia causes high blood pressure and can cause problems with many organ systems in your body.  It can also affect the growth of your baby. The exact cause of preeclampsia is unknown, however, there are signs and symptoms to watch for:    -A bad headache that doesn't improve with Tylenol  -Visual changes such as spots, flashes of light, blurry vision  -Pain in the upper right part of your abdomen, especially under the ribs that doesn't go away  -Nausea and/or vomiting  -Feeling extremely tired  -Yellowing of the skin and/or eyes  -Feeling \"not quite right\" or that something is wrong  -An extreme amount of swelling (some swelling in pregnancy is very normal)    If your midwife feels that you are developing preeclampsia, you will have lab tests drawn and will be monitored very closely.     If you are experiencing anyof these symptoms, call the [employer] [facility] immediately at [phone].    Fetal Kick Counts    It is important to know when your baby's movements occur. We often get busy with work and life and do not pay close attention to their movements.      Women typically begin feeling movement between 18-22 weeks of gestation, sometimes it can be earlier or later depending on where your placenta is     Movements usually begin feeling like popping or fluttering and as the baby grows they become more pronounced    Toward the end of pregnancy as the baby gets larger they may not move as much or make as big of movements. Babies have maturing sleep cycles as well as not as much room to move and flip. If you are ever concerned about your baby's movements or have not felt the baby move for a while, we recommend you do a fetal kick count. Prior to starting your count drink a glass of water or juice and eat a snack. Then lay down on your side and begin to count movements.     How to do a " Fetal Kick Counts    There are many different ways to monitor your baby's movements. Movements can range from large jabs to small kicks, or wiggles.  Hiccups count!    Count 10 movements in 2 hours when resting and focusing  Count 10 movements in 12 hours when doing normal activity    We recommend that if movements occur but seem decreased that you should be seen in the clinic or hospital for evaluation within 12 hours. If fetal movement is absent or fetal kick counts are low please contact us right away.    If you ever have any concerns about your baby's movements DO NOT HESITATE to call us, we are here for you!    [facility]    [phone]

## 2025-02-19 ENCOUNTER — ANCILLARY PROCEDURE (OUTPATIENT)
Dept: ULTRASOUND IMAGING | Facility: CLINIC | Age: 40
End: 2025-02-19
Attending: ADVANCED PRACTICE MIDWIFE
Payer: COMMERCIAL

## 2025-02-19 ENCOUNTER — PRENATAL OFFICE VISIT (OUTPATIENT)
Dept: MIDWIFE SERVICES | Facility: CLINIC | Age: 40
End: 2025-02-19
Attending: ADVANCED PRACTICE MIDWIFE
Payer: COMMERCIAL

## 2025-02-19 VITALS — DIASTOLIC BLOOD PRESSURE: 80 MMHG | WEIGHT: 176.6 LBS | BODY MASS INDEX: 32.3 KG/M2 | SYSTOLIC BLOOD PRESSURE: 110 MMHG

## 2025-02-19 DIAGNOSIS — O44.42 LOW-LYING PLACENTA IN SECOND TRIMESTER: ICD-10-CM

## 2025-02-19 DIAGNOSIS — O09.92 SUPERVISION OF HIGH RISK PREGNANCY IN SECOND TRIMESTER: Primary | ICD-10-CM

## 2025-02-19 DIAGNOSIS — O09.512 PRIMIGRAVIDA OF ADVANCED MATERNAL AGE IN SECOND TRIMESTER: ICD-10-CM

## 2025-02-19 DIAGNOSIS — Z3A.32 32 WEEKS GESTATION OF PREGNANCY: ICD-10-CM

## 2025-02-19 DIAGNOSIS — Z28.39 RUBELLA NON-IMMUNE STATUS, ANTEPARTUM: ICD-10-CM

## 2025-02-19 DIAGNOSIS — O09.92 SUPERVISION OF HIGH RISK PREGNANCY IN SECOND TRIMESTER: ICD-10-CM

## 2025-02-19 DIAGNOSIS — O09.899 RUBELLA NON-IMMUNE STATUS, ANTEPARTUM: ICD-10-CM

## 2025-02-19 PROCEDURE — 99207 PR PRENATAL VISIT: CPT | Performed by: ADVANCED PRACTICE MIDWIFE

## 2025-02-19 PROCEDURE — 76816 OB US FOLLOW-UP PER FETUS: CPT | Performed by: OBSTETRICS & GYNECOLOGY

## 2025-03-07 PROBLEM — O44.42 LOW-LYING PLACENTA IN SECOND TRIMESTER: Status: RESOLVED | Noted: 2024-11-19 | Resolved: 2025-03-07

## 2025-03-12 ENCOUNTER — MEDICAL CORRESPONDENCE (OUTPATIENT)
Dept: HEALTH INFORMATION MANAGEMENT | Facility: CLINIC | Age: 40
End: 2025-03-12

## 2025-03-12 ENCOUNTER — MYC MEDICAL ADVICE (OUTPATIENT)
Dept: MIDWIFE SERVICES | Facility: CLINIC | Age: 40
End: 2025-03-12
Payer: COMMERCIAL

## 2025-03-12 NOTE — TELEPHONE ENCOUNTER
Breast pump form completed and faxed to [employer] per pt request    Verna Reynolds RN on 3/12/2025 at 12:07 PM  [facility] Triage

## 2025-03-21 ENCOUNTER — ANCILLARY PROCEDURE (OUTPATIENT)
Dept: ULTRASOUND IMAGING | Facility: CLINIC | Age: 40
End: 2025-03-21
Attending: ADVANCED PRACTICE MIDWIFE
Payer: COMMERCIAL

## 2025-03-21 DIAGNOSIS — O09.92 SUPERVISION OF HIGH RISK PREGNANCY IN SECOND TRIMESTER: ICD-10-CM

## 2025-03-21 DIAGNOSIS — O09.512 PRIMIGRAVIDA OF ADVANCED MATERNAL AGE IN SECOND TRIMESTER: ICD-10-CM

## 2025-03-21 PROCEDURE — 76819 FETAL BIOPHYS PROFIL W/O NST: CPT | Performed by: OBSTETRICS & GYNECOLOGY

## 2025-03-28 ENCOUNTER — ANCILLARY PROCEDURE (OUTPATIENT)
Dept: ULTRASOUND IMAGING | Facility: CLINIC | Age: 40
End: 2025-03-28
Attending: ADVANCED PRACTICE MIDWIFE
Payer: COMMERCIAL

## 2025-03-28 PROCEDURE — 76819 FETAL BIOPHYS PROFIL W/O NST: CPT | Performed by: OBSTETRICS & GYNECOLOGY

## 2025-04-02 PROBLEM — E78.5 HYPERLIPIDEMIA LDL GOAL <100: Status: RESOLVED | Noted: 2024-09-21 | Resolved: 2025-04-02

## 2025-04-04 ENCOUNTER — ANCILLARY PROCEDURE (OUTPATIENT)
Dept: ULTRASOUND IMAGING | Facility: CLINIC | Age: 40
End: 2025-04-04
Attending: ADVANCED PRACTICE MIDWIFE
Payer: COMMERCIAL

## 2025-04-04 PROCEDURE — 76819 FETAL BIOPHYS PROFIL W/O NST: CPT | Performed by: OBSTETRICS & GYNECOLOGY

## 2025-04-05 ENCOUNTER — HOSPITAL ENCOUNTER (OUTPATIENT)
Facility: CLINIC | Age: 40
Discharge: HOME | End: 2025-04-05
Attending: ADVANCED PRACTICE MIDWIFE | Admitting: ADVANCED PRACTICE MIDWIFE
Payer: COMMERCIAL

## 2025-04-05 ENCOUNTER — NURSE TRIAGE (OUTPATIENT)
Dept: NURSING | Facility: CLINIC | Age: 40
End: 2025-04-05
Payer: COMMERCIAL

## 2025-04-05 VITALS
DIASTOLIC BLOOD PRESSURE: 77 MMHG | HEIGHT: 62 IN | SYSTOLIC BLOOD PRESSURE: 126 MMHG | RESPIRATION RATE: 16 BRPM | BODY MASS INDEX: 33.68 KG/M2 | WEIGHT: 183 LBS | TEMPERATURE: 97.4 F

## 2025-04-05 DIAGNOSIS — O09.92 SUPERVISION OF HIGH RISK PREGNANCY IN SECOND TRIMESTER: Primary | ICD-10-CM

## 2025-04-05 DIAGNOSIS — O09.512 PRIMIGRAVIDA OF ADVANCED MATERNAL AGE IN SECOND TRIMESTER: ICD-10-CM

## 2025-04-05 DIAGNOSIS — O44.42 LOW-LYING PLACENTA IN SECOND TRIMESTER: ICD-10-CM

## 2025-04-05 LAB — RUPTURE OF FETAL MEMBRANES BY ROM PLUS: NEGATIVE

## 2025-04-05 PROCEDURE — G0463 HOSPITAL OUTPT CLINIC VISIT: HCPCS

## 2025-04-05 PROCEDURE — 84112 EVAL AMNIOTIC FLUID PROTEIN: CPT | Performed by: ADVANCED PRACTICE MIDWIFE

## 2025-04-05 ASSESSMENT — ACTIVITIES OF DAILY LIVING (ADL): ADLS_ACUITY_SCORE: 15

## 2025-04-05 NOTE — PLAN OF CARE
Patient here for rule out rupture of membranes. Patient has noticed some inconsistent leaking of fluid since last evening.     HX and VS taken.     Verbal permission given to apply external monitors. Reports + fetal movement. Denies any bleeding or painful contractions.

## 2025-04-05 NOTE — TELEPHONE ENCOUNTER
"OB Triage Call      Is patient's OB/Midwife with the [facility] or [facility]? [facility]- Proceed with triage     Reason for call:   Spoke with 39 yr old Faith who is 38 weeks 3 days pregnant.  Appointment yesterday at the clinic, 25.  A couple times yesterday, patient noticed an increase in watery vaginal drainage, underwear wet, mostly happened after urination.  Today 20 minutes after urination more watery,vaginal drainage, enough to run down her leg.  Denies cramps or contractions.    Assessment: evaluation needed.  Go to L & D  advised per protocol.    Plan: will page CNM on call     Patient plans to deliver at [facility]    Patient's primary OB Provider is Flower Perez.      Per protocol recommendations Patient to be evaluated in L&D. Patient's primary OB is [facility]. Paged on-call midwife for patient's primary OB clinic (refer to where patient is seen as midwives may go to multiple locations) Romina Millan CNM to call [facility] back at [phone].  Call returned at 8:10 am and advised on triage assessment. Does midwife recommend L&D evaluation? Yes-  Labor and delivery at [facility] ([phone]) notified of patient's pending arrival. Patient notified to go to L&D by RN. Report called to triage nurse at L& D.        Is patient's delivering hospital on divert? No      38w3d    Estimated Date of Delivery: 2025        OB History    Para Term  AB Living   1 0 0 0 0 0   SAB IAB Ectopic Multiple Live Births   0 0 0 0 0      # Outcome Date GA Lbr Alejo/2nd Weight Sex Type Anes PTL Lv   1 Current                No results found for: \"GBS\"       Erna Irvin, RN   Reason for Disposition   Leakage of fluid from vagina  (Exception: Patient is uncertain, but thinks it might be urine incontinence.)    Additional Information   Negative: [1] SEVERE abdominal pain (e.g., excruciating) AND [2] constant   Negative: SEVERE bleeding (e.g., continuous red blood from vagina, or large blood clots)   " "Negative: Umbilical cord hanging out of the vagina (shiny, white, curled appearance, \"like telephone cord\")   Negative: Uncontrollable urge to push (i.e., feels like baby is coming out now)   Negative: Can see baby   Negative: Sounds like a life-threatening emergency to the triager   Negative: < 20 weeks pregnant   Negative: Vaginal bleeding    Protocols used: Pregnancy - Rupture of Membranes [id number]-CC    "

## 2025-04-05 NOTE — PROVIDER NOTIFICATION
04/05/25 1014   Provider Notification   Provider Name/Title SUMMER Millan CNM   Method of Notification Electronic Page;Phone   Request Evaluate - Remote   Notification Reason Status Update;Other (Comment)     Telephone update to SUMMER Millan CNM. Updated on patients arrival and reports of leaking of fluid. Per patients report, the leaking seems to align with before or after she voids. Patient also had a cervical exam in the clinic yesterday. ROM+ sent, came back negative.     Cat I FHR tracing. Patient reporting + fetal movement. Denies feeling any painful contractions. Denies any bleeding. Encouraged patient to monitor leaking, and start wearing a pad to see if she is soaking through the pad throughout the day.     Discharge instructions reviewed. All questions answered.

## 2025-04-09 ENCOUNTER — MYC MEDICAL ADVICE (OUTPATIENT)
Dept: MIDWIFE SERVICES | Facility: CLINIC | Age: 40
End: 2025-04-09
Payer: COMMERCIAL

## 2025-04-10 ENCOUNTER — ANCILLARY PROCEDURE (OUTPATIENT)
Dept: ULTRASOUND IMAGING | Facility: CLINIC | Age: 40
End: 2025-04-10
Attending: ADVANCED PRACTICE MIDWIFE
Payer: COMMERCIAL

## 2025-04-10 ENCOUNTER — PRENATAL OFFICE VISIT (OUTPATIENT)
Dept: MIDWIFE SERVICES | Facility: CLINIC | Age: 40
End: 2025-04-10
Attending: ADVANCED PRACTICE MIDWIFE
Payer: COMMERCIAL

## 2025-04-10 VITALS — BODY MASS INDEX: 33.14 KG/M2 | WEIGHT: 181.2 LBS | DIASTOLIC BLOOD PRESSURE: 74 MMHG | SYSTOLIC BLOOD PRESSURE: 122 MMHG

## 2025-04-10 DIAGNOSIS — O09.513 PRIMIGRAVIDA OF ADVANCED MATERNAL AGE IN THIRD TRIMESTER: ICD-10-CM

## 2025-04-10 DIAGNOSIS — Z3A.39 39 WEEKS GESTATION OF PREGNANCY: ICD-10-CM

## 2025-04-10 DIAGNOSIS — O09.93 SUPERVISION OF HIGH RISK PREGNANCY IN THIRD TRIMESTER: Primary | ICD-10-CM

## 2025-04-10 PROCEDURE — 76819 FETAL BIOPHYS PROFIL W/O NST: CPT | Performed by: OBSTETRICS & GYNECOLOGY

## 2025-04-10 NOTE — PROGRESS NOTES
39w1d  Feels good overall. Here with Humberto today.  Fetal Movement: present   Denies loss of fluid/vb & s/sx of preeclampsia.     BPP:  8/8; MVP: 3.2 cm/ LEATHA 6.8 cm; FHT: 141 bpm      Patient Active Problem List   Diagnosis    Hypothyroidism, unspecified type    Supervision of high-risk pregnancy    Primigravida of advanced maternal age in third trimester        Rubella non-immune status, antepartum        Low-lying placenta in second trimester - resolved           Fetal kick/movement counts reviewed  Labor signs and symptoms discussed, aware of numbers to call  Discussed warning signs, signs and symptoms preeclampsia    IOL scheduled for tomorrow evening.    Sonya Gotti, DNP, APRN, CNM

## 2025-04-10 NOTE — PATIENT INSTRUCTIONS
Induction of Labor    We have scheduled you for cervical ripening on Saturday 4/11/25 at 7:30 pm.    [facility] is eager to provide you with the best care possible during your scheduled induction. We will do all we can to provide you the care as scheduled. However, because we are never sure how many patient we will have at any given time, there may be times when your scheduled induction will need to be delayed. You can be assured that your induction will not be delayed beyond a point of safety for you and your baby.    One (1) hour before your scheduled induction, please call [facility] at [phone] and ask to speak to the charge nurse to verify your induction. The charge nurse will verify the time you should arrive at the hospital.    On arrival to the hospital, please park in the [address] (near the Emergency Room). From there go to Door 6 (there is a large overhang over the door). Someone will be there to help you find your way to the Labor & Delivery Unit on the 2nd floor.     Thank you, and we look forward to caring for you and your baby!

## 2025-04-11 ENCOUNTER — HOSPITAL ENCOUNTER (INPATIENT)
Facility: CLINIC | Age: 40
LOS: 3 days | Discharge: HOME | End: 2025-04-14
Attending: ADVANCED PRACTICE MIDWIFE
Payer: COMMERCIAL

## 2025-04-11 DIAGNOSIS — O09.513 PRIMIGRAVIDA OF ADVANCED MATERNAL AGE IN THIRD TRIMESTER: ICD-10-CM

## 2025-04-11 DIAGNOSIS — O13.9 GESTATIONAL HYPERTENSION, ANTEPARTUM: ICD-10-CM

## 2025-04-11 DIAGNOSIS — O09.93 SUPERVISION OF HIGH RISK PREGNANCY IN THIRD TRIMESTER: Primary | ICD-10-CM

## 2025-04-11 PROBLEM — Z34.90 ENCOUNTER FOR INDUCTION OF LABOR: Status: ACTIVE | Noted: 2025-04-11

## 2025-04-11 LAB
ABO + RH BLD: NORMAL
BLD GP AB SCN SERPL QL: NEGATIVE
ERYTHROCYTE [DISTWIDTH] IN BLOOD BY AUTOMATED COUNT: 14 % (ref 10–15)
HCT VFR BLD AUTO: 37.9 % (ref 35–47)
HGB BLD-MCNC: 12.4 G/DL (ref 11.7–15.7)
MCH RBC QN AUTO: 26.3 PG (ref 26.5–33)
MCHC RBC AUTO-ENTMCNC: 32.7 G/DL (ref 31.5–36.5)
MCV RBC AUTO: 81 FL (ref 78–100)
PLATELET # BLD AUTO: 335 10E3/UL (ref 150–450)
RBC # BLD AUTO: 4.71 10E6/UL (ref 3.8–5.2)
RUPTURE OF FETAL MEMBRANES BY ROM PLUS: POSITIVE
SPECIMEN EXP DATE BLD: NORMAL
WBC # BLD AUTO: 13 10E3/UL (ref 4–11)

## 2025-04-11 PROCEDURE — 59025 FETAL NON-STRESS TEST: CPT | Mod: 26 | Performed by: ADVANCED PRACTICE MIDWIFE

## 2025-04-11 PROCEDURE — 120N000012 HC R&B POSTPARTUM

## 2025-04-11 PROCEDURE — 250N000013 HC RX MED GY IP 250 OP 250 PS 637: Performed by: ADVANCED PRACTICE MIDWIFE

## 2025-04-11 PROCEDURE — 3E0P7VZ INTRODUCTION OF HORMONE INTO FEMALE REPRODUCTIVE, VIA NATURAL OR ARTIFICIAL OPENING: ICD-10-PCS | Performed by: ADVANCED PRACTICE MIDWIFE

## 2025-04-11 PROCEDURE — 84112 EVAL AMNIOTIC FLUID PROTEIN: CPT | Performed by: ADVANCED PRACTICE MIDWIFE

## 2025-04-11 PROCEDURE — 86900 BLOOD TYPING SEROLOGIC ABO: CPT | Performed by: ADVANCED PRACTICE MIDWIFE

## 2025-04-11 PROCEDURE — 99221 1ST HOSP IP/OBS SF/LOW 40: CPT | Mod: 25 | Performed by: ADVANCED PRACTICE MIDWIFE

## 2025-04-11 PROCEDURE — 86780 TREPONEMA PALLIDUM: CPT | Performed by: ADVANCED PRACTICE MIDWIFE

## 2025-04-11 PROCEDURE — 85014 HEMATOCRIT: CPT | Performed by: ADVANCED PRACTICE MIDWIFE

## 2025-04-11 RX ORDER — LIDOCAINE 40 MG/G
CREAM TOPICAL
Status: DISCONTINUED | OUTPATIENT
Start: 2025-04-11 | End: 2025-04-12

## 2025-04-11 RX ORDER — ACETAMINOPHEN 325 MG/1
650 TABLET ORAL EVERY 4 HOURS PRN
Status: DISCONTINUED | OUTPATIENT
Start: 2025-04-11 | End: 2025-04-12 | Stop reason: HOSPADM

## 2025-04-11 RX ORDER — FENTANYL CITRATE 50 UG/ML
100 INJECTION, SOLUTION INTRAMUSCULAR; INTRAVENOUS
Status: DISCONTINUED | OUTPATIENT
Start: 2025-04-11 | End: 2025-04-12 | Stop reason: HOSPADM

## 2025-04-11 RX ORDER — NALOXONE HYDROCHLORIDE 0.4 MG/ML
0.2 INJECTION, SOLUTION INTRAMUSCULAR; INTRAVENOUS; SUBCUTANEOUS
Status: DISCONTINUED | OUTPATIENT
Start: 2025-04-11 | End: 2025-04-14 | Stop reason: HOSPADM

## 2025-04-11 RX ORDER — KETOROLAC TROMETHAMINE 15 MG/ML
15 INJECTION, SOLUTION INTRAMUSCULAR; INTRAVENOUS
Status: DISCONTINUED | OUTPATIENT
Start: 2025-04-11 | End: 2025-04-12

## 2025-04-11 RX ORDER — MORPHINE SULFATE 10 MG/ML
10 INJECTION, SOLUTION INTRAMUSCULAR; INTRAVENOUS
Status: DISCONTINUED | OUTPATIENT
Start: 2025-04-11 | End: 2025-04-12 | Stop reason: HOSPADM

## 2025-04-11 RX ORDER — CARBOPROST TROMETHAMINE 250 UG/ML
250 INJECTION, SOLUTION INTRAMUSCULAR
Status: DISCONTINUED | OUTPATIENT
Start: 2025-04-11 | End: 2025-04-12 | Stop reason: HOSPADM

## 2025-04-11 RX ORDER — HYDROXYZINE HYDROCHLORIDE 25 MG/1
50 TABLET, FILM COATED ORAL
Status: DISCONTINUED | OUTPATIENT
Start: 2025-04-11 | End: 2025-04-12 | Stop reason: HOSPADM

## 2025-04-11 RX ORDER — METOCLOPRAMIDE 10 MG/1
10 TABLET ORAL EVERY 6 HOURS PRN
Status: DISCONTINUED | OUTPATIENT
Start: 2025-04-11 | End: 2025-04-12 | Stop reason: HOSPADM

## 2025-04-11 RX ORDER — OXYCODONE HYDROCHLORIDE 5 MG/1
5 TABLET ORAL
Status: DISCONTINUED | OUTPATIENT
Start: 2025-04-11 | End: 2025-04-12 | Stop reason: HOSPADM

## 2025-04-11 RX ORDER — METOCLOPRAMIDE HYDROCHLORIDE 5 MG/ML
10 INJECTION INTRAMUSCULAR; INTRAVENOUS EVERY 6 HOURS PRN
Status: DISCONTINUED | OUTPATIENT
Start: 2025-04-11 | End: 2025-04-12 | Stop reason: HOSPADM

## 2025-04-11 RX ORDER — METHYLERGONOVINE MALEATE 0.2 MG/ML
200 INJECTION INTRAVENOUS
Status: DISCONTINUED | OUTPATIENT
Start: 2025-04-11 | End: 2025-04-12 | Stop reason: HOSPADM

## 2025-04-11 RX ORDER — LOPERAMIDE HYDROCHLORIDE 2 MG/1
2 CAPSULE ORAL
Status: DISCONTINUED | OUTPATIENT
Start: 2025-04-11 | End: 2025-04-12 | Stop reason: HOSPADM

## 2025-04-11 RX ORDER — OXYTOCIN 10 [USP'U]/ML
10 INJECTION, SOLUTION INTRAMUSCULAR; INTRAVENOUS
Status: DISCONTINUED | OUTPATIENT
Start: 2025-04-11 | End: 2025-04-12 | Stop reason: HOSPADM

## 2025-04-11 RX ORDER — TRANEXAMIC ACID 10 MG/ML
1 INJECTION, SOLUTION INTRAVENOUS EVERY 30 MIN PRN
Status: DISCONTINUED | OUTPATIENT
Start: 2025-04-11 | End: 2025-04-12 | Stop reason: HOSPADM

## 2025-04-11 RX ORDER — CITRIC ACID/SODIUM CITRATE 334-500MG
30 SOLUTION, ORAL ORAL
Status: DISCONTINUED | OUTPATIENT
Start: 2025-04-11 | End: 2025-04-12 | Stop reason: HOSPADM

## 2025-04-11 RX ORDER — TERBUTALINE SULFATE 1 MG/ML
0.25 INJECTION SUBCUTANEOUS
Status: DISCONTINUED | OUTPATIENT
Start: 2025-04-11 | End: 2025-04-12 | Stop reason: HOSPADM

## 2025-04-11 RX ORDER — LEVOTHYROXINE SODIUM 175 UG/1
175 TABLET ORAL
Status: DISCONTINUED | OUTPATIENT
Start: 2025-04-12 | End: 2025-04-14 | Stop reason: HOSPADM

## 2025-04-11 RX ORDER — IBUPROFEN 400 MG/1
800 TABLET, FILM COATED ORAL
Status: DISCONTINUED | OUTPATIENT
Start: 2025-04-11 | End: 2025-04-12

## 2025-04-11 RX ORDER — LOPERAMIDE HYDROCHLORIDE 2 MG/1
4 CAPSULE ORAL
Status: DISCONTINUED | OUTPATIENT
Start: 2025-04-11 | End: 2025-04-12 | Stop reason: HOSPADM

## 2025-04-11 RX ORDER — OXYTOCIN/0.9 % SODIUM CHLORIDE 30/500 ML
340 PLASTIC BAG, INJECTION (ML) INTRAVENOUS CONTINUOUS PRN
Status: DISCONTINUED | OUTPATIENT
Start: 2025-04-11 | End: 2025-04-12 | Stop reason: HOSPADM

## 2025-04-11 RX ORDER — MISOPROSTOL 200 UG/1
400 TABLET ORAL
Status: DISCONTINUED | OUTPATIENT
Start: 2025-04-11 | End: 2025-04-12 | Stop reason: HOSPADM

## 2025-04-11 RX ORDER — PROCHLORPERAZINE MALEATE 5 MG/1
10 TABLET ORAL EVERY 6 HOURS PRN
Status: DISCONTINUED | OUTPATIENT
Start: 2025-04-11 | End: 2025-04-12 | Stop reason: HOSPADM

## 2025-04-11 RX ORDER — ONDANSETRON 2 MG/ML
4 INJECTION INTRAMUSCULAR; INTRAVENOUS EVERY 6 HOURS PRN
Status: DISCONTINUED | OUTPATIENT
Start: 2025-04-11 | End: 2025-04-12 | Stop reason: HOSPADM

## 2025-04-11 RX ORDER — MISOPROSTOL 100 UG/1
25 TABLET ORAL
Status: DISCONTINUED | OUTPATIENT
Start: 2025-04-11 | End: 2025-04-12 | Stop reason: HOSPADM

## 2025-04-11 RX ORDER — ONDANSETRON 4 MG/1
4 TABLET, ORALLY DISINTEGRATING ORAL EVERY 6 HOURS PRN
Status: DISCONTINUED | OUTPATIENT
Start: 2025-04-11 | End: 2025-04-12 | Stop reason: HOSPADM

## 2025-04-11 RX ORDER — NALOXONE HYDROCHLORIDE 0.4 MG/ML
0.4 INJECTION, SOLUTION INTRAMUSCULAR; INTRAVENOUS; SUBCUTANEOUS
Status: DISCONTINUED | OUTPATIENT
Start: 2025-04-11 | End: 2025-04-14 | Stop reason: HOSPADM

## 2025-04-11 RX ORDER — OXYTOCIN 10 [USP'U]/ML
10 INJECTION, SOLUTION INTRAMUSCULAR; INTRAVENOUS
Status: DISCONTINUED | OUTPATIENT
Start: 2025-04-11 | End: 2025-04-12

## 2025-04-11 RX ORDER — OXYTOCIN/0.9 % SODIUM CHLORIDE 30/500 ML
100-340 PLASTIC BAG, INJECTION (ML) INTRAVENOUS CONTINUOUS PRN
Status: DISCONTINUED | OUTPATIENT
Start: 2025-04-11 | End: 2025-04-12

## 2025-04-11 RX ORDER — MISOPROSTOL 200 UG/1
800 TABLET ORAL
Status: DISCONTINUED | OUTPATIENT
Start: 2025-04-11 | End: 2025-04-12 | Stop reason: HOSPADM

## 2025-04-11 RX ADMIN — MISOPROSTOL 25 MCG: 100 TABLET ORAL at 21:39

## 2025-04-11 ASSESSMENT — ACTIVITIES OF DAILY LIVING (ADL)
ADLS_ACUITY_SCORE: 16
ADLS_ACUITY_SCORE: 16
ADLS_ACUITY_SCORE: 15
ADLS_ACUITY_SCORE: 15

## 2025-04-12 ENCOUNTER — ANESTHESIA (OUTPATIENT)
Dept: OBGYN | Facility: CLINIC | Age: 40
End: 2025-04-12
Payer: COMMERCIAL

## 2025-04-12 ENCOUNTER — ANESTHESIA EVENT (OUTPATIENT)
Dept: OBGYN | Facility: CLINIC | Age: 40
End: 2025-04-12
Payer: COMMERCIAL

## 2025-04-12 PROBLEM — O42.90 PROLONGED RUPTURE OF MEMBRANES: Status: ACTIVE | Noted: 2025-04-12

## 2025-04-12 LAB — T PALLIDUM AB SER QL: NONREACTIVE

## 2025-04-12 PROCEDURE — 258N000003 HC RX IP 258 OP 636: Performed by: ADVANCED PRACTICE MIDWIFE

## 2025-04-12 PROCEDURE — 722N000001 HC LABOR CARE VAGINAL DELIVERY SINGLE

## 2025-04-12 PROCEDURE — 250N000009 HC RX 250: Performed by: NURSE PRACTITIONER

## 2025-04-12 PROCEDURE — 250N000013 HC RX MED GY IP 250 OP 250 PS 637: Performed by: NURSE PRACTITIONER

## 2025-04-12 PROCEDURE — 59410 OBSTETRICAL CARE: CPT | Performed by: NURSE PRACTITIONER

## 2025-04-12 PROCEDURE — 250N000013 HC RX MED GY IP 250 OP 250 PS 637: Performed by: ADVANCED PRACTICE MIDWIFE

## 2025-04-12 PROCEDURE — 00HU33Z INSERTION OF INFUSION DEVICE INTO SPINAL CANAL, PERCUTANEOUS APPROACH: ICD-10-PCS | Performed by: ANESTHESIOLOGY

## 2025-04-12 PROCEDURE — 250N000011 HC RX IP 250 OP 636: Mod: JZ | Performed by: ANESTHESIOLOGY

## 2025-04-12 PROCEDURE — 258N000003 HC RX IP 258 OP 636: Performed by: ANESTHESIOLOGY

## 2025-04-12 PROCEDURE — 250N000011 HC RX IP 250 OP 636: Performed by: ANESTHESIOLOGY

## 2025-04-12 PROCEDURE — 258N000003 HC RX IP 258 OP 636: Performed by: NURSE PRACTITIONER

## 2025-04-12 PROCEDURE — 370N000003 HC ANESTHESIA WARD SERVICE: Performed by: ANESTHESIOLOGY

## 2025-04-12 PROCEDURE — 120N000013 HC R&B IMCU

## 2025-04-12 PROCEDURE — 3E0R3BZ INTRODUCTION OF ANESTHETIC AGENT INTO SPINAL CANAL, PERCUTANEOUS APPROACH: ICD-10-PCS | Performed by: ANESTHESIOLOGY

## 2025-04-12 RX ORDER — AMOXICILLIN 250 MG
2 CAPSULE ORAL
Status: DISCONTINUED | OUTPATIENT
Start: 2025-04-12 | End: 2025-04-14 | Stop reason: HOSPADM

## 2025-04-12 RX ORDER — IBUPROFEN 400 MG/1
800 TABLET, FILM COATED ORAL EVERY 6 HOURS PRN
Status: DISCONTINUED | OUTPATIENT
Start: 2025-04-12 | End: 2025-04-14 | Stop reason: HOSPADM

## 2025-04-12 RX ORDER — LOPERAMIDE HYDROCHLORIDE 2 MG/1
2 CAPSULE ORAL
Status: DISCONTINUED | OUTPATIENT
Start: 2025-04-12 | End: 2025-04-14 | Stop reason: HOSPADM

## 2025-04-12 RX ORDER — HYDROCORTISONE 25 MG/G
CREAM TOPICAL 3 TIMES DAILY PRN
Status: DISCONTINUED | OUTPATIENT
Start: 2025-04-12 | End: 2025-04-14 | Stop reason: HOSPADM

## 2025-04-12 RX ORDER — OXYTOCIN/0.9 % SODIUM CHLORIDE 30/500 ML
1-24 PLASTIC BAG, INJECTION (ML) INTRAVENOUS CONTINUOUS
Status: DISCONTINUED | OUTPATIENT
Start: 2025-04-12 | End: 2025-04-12 | Stop reason: HOSPADM

## 2025-04-12 RX ORDER — SODIUM CHLORIDE, SODIUM LACTATE, POTASSIUM CHLORIDE, CALCIUM CHLORIDE 600; 310; 30; 20 MG/100ML; MG/100ML; MG/100ML; MG/100ML
INJECTION, SOLUTION INTRAVENOUS CONTINUOUS PRN
Status: DISCONTINUED | OUTPATIENT
Start: 2025-04-12 | End: 2025-04-12 | Stop reason: HOSPADM

## 2025-04-12 RX ORDER — LOPERAMIDE HYDROCHLORIDE 2 MG/1
4 CAPSULE ORAL
Status: DISCONTINUED | OUTPATIENT
Start: 2025-04-12 | End: 2025-04-14 | Stop reason: HOSPADM

## 2025-04-12 RX ORDER — LIDOCAINE 40 MG/G
CREAM TOPICAL
Status: DISCONTINUED | OUTPATIENT
Start: 2025-04-12 | End: 2025-04-12 | Stop reason: HOSPADM

## 2025-04-12 RX ORDER — ROPIVACAINE HYDROCHLORIDE 2 MG/ML
10 INJECTION, SOLUTION EPIDURAL; INFILTRATION; PERINEURAL ONCE
Status: COMPLETED | OUTPATIENT
Start: 2025-04-12 | End: 2025-04-12

## 2025-04-12 RX ORDER — TRANEXAMIC ACID 10 MG/ML
1 INJECTION, SOLUTION INTRAVENOUS EVERY 30 MIN PRN
Status: DISCONTINUED | OUTPATIENT
Start: 2025-04-12 | End: 2025-04-14 | Stop reason: HOSPADM

## 2025-04-12 RX ORDER — METHYLERGONOVINE MALEATE 0.2 MG/ML
200 INJECTION INTRAVENOUS
Status: DISCONTINUED | OUTPATIENT
Start: 2025-04-12 | End: 2025-04-14 | Stop reason: HOSPADM

## 2025-04-12 RX ORDER — BISACODYL 10 MG
10 SUPPOSITORY, RECTAL RECTAL DAILY PRN
Status: DISCONTINUED | OUTPATIENT
Start: 2025-04-12 | End: 2025-04-14 | Stop reason: HOSPADM

## 2025-04-12 RX ORDER — FENTANYL CITRATE-0.9 % NACL/PF 10 MCG/ML
100 PLASTIC BAG, INJECTION (ML) INTRAVENOUS EVERY 5 MIN PRN
Status: DISCONTINUED | OUTPATIENT
Start: 2025-04-12 | End: 2025-04-12 | Stop reason: HOSPADM

## 2025-04-12 RX ORDER — ACETAMINOPHEN 325 MG/1
650 TABLET ORAL EVERY 4 HOURS PRN
Status: DISCONTINUED | OUTPATIENT
Start: 2025-04-12 | End: 2025-04-14 | Stop reason: HOSPADM

## 2025-04-12 RX ORDER — ROPIVACAINE HYDROCHLORIDE 2 MG/ML
INJECTION, SOLUTION EPIDURAL; INFILTRATION; PERINEURAL
Status: COMPLETED | OUTPATIENT
Start: 2025-04-12 | End: 2025-04-12

## 2025-04-12 RX ORDER — MISOPROSTOL 200 UG/1
800 TABLET ORAL
Status: DISCONTINUED | OUTPATIENT
Start: 2025-04-12 | End: 2025-04-14 | Stop reason: HOSPADM

## 2025-04-12 RX ORDER — MISOPROSTOL 200 UG/1
400 TABLET ORAL
Status: DISCONTINUED | OUTPATIENT
Start: 2025-04-12 | End: 2025-04-14 | Stop reason: HOSPADM

## 2025-04-12 RX ORDER — CARBOPROST TROMETHAMINE 250 UG/ML
250 INJECTION, SOLUTION INTRAMUSCULAR
Status: DISCONTINUED | OUTPATIENT
Start: 2025-04-12 | End: 2025-04-14 | Stop reason: HOSPADM

## 2025-04-12 RX ORDER — POLYETHYLENE GLYCOL 3350 17 G/17G
17 POWDER, FOR SOLUTION ORAL DAILY PRN
Status: DISCONTINUED | OUTPATIENT
Start: 2025-04-12 | End: 2025-04-14 | Stop reason: HOSPADM

## 2025-04-12 RX ORDER — NALBUPHINE HYDROCHLORIDE 10 MG/ML
2.5-5 INJECTION INTRAMUSCULAR; INTRAVENOUS; SUBCUTANEOUS EVERY 6 HOURS PRN
Status: DISCONTINUED | OUTPATIENT
Start: 2025-04-12 | End: 2025-04-14 | Stop reason: HOSPADM

## 2025-04-12 RX ORDER — OXYTOCIN 10 [USP'U]/ML
10 INJECTION, SOLUTION INTRAMUSCULAR; INTRAVENOUS
Status: DISCONTINUED | OUTPATIENT
Start: 2025-04-12 | End: 2025-04-14 | Stop reason: HOSPADM

## 2025-04-12 RX ORDER — OXYTOCIN/0.9 % SODIUM CHLORIDE 30/500 ML
340 PLASTIC BAG, INJECTION (ML) INTRAVENOUS CONTINUOUS PRN
Status: DISCONTINUED | OUTPATIENT
Start: 2025-04-12 | End: 2025-04-14 | Stop reason: HOSPADM

## 2025-04-12 RX ADMIN — IBUPROFEN 800 MG: 400 TABLET, FILM COATED ORAL at 23:33

## 2025-04-12 RX ADMIN — FENTANYL CITRATE: 0.05 INJECTION, SOLUTION INTRAMUSCULAR; INTRAVENOUS at 21:08

## 2025-04-12 RX ADMIN — LEVOTHYROXINE SODIUM 175 MCG: 175 TABLET ORAL at 08:03

## 2025-04-12 RX ADMIN — MISOPROSTOL 25 MCG: 100 TABLET ORAL at 06:02

## 2025-04-12 RX ADMIN — Medication 2 MILLI-UNITS/MIN: at 11:19

## 2025-04-12 RX ADMIN — MISOPROSTOL 25 MCG: 100 TABLET ORAL at 02:00

## 2025-04-12 RX ADMIN — ROPIVACAINE HYDROCHLORIDE 9 ML: 2 INJECTION, SOLUTION EPIDURAL; INFILTRATION at 14:25

## 2025-04-12 RX ADMIN — MISOPROSTOL 25 MCG: 100 TABLET ORAL at 08:09

## 2025-04-12 RX ADMIN — ROPIVACAINE HYDROCHLORIDE 10 ML: 2 INJECTION, SOLUTION EPIDURAL; INFILTRATION at 14:27

## 2025-04-12 RX ADMIN — FENTANYL CITRATE: 0.05 INJECTION, SOLUTION INTRAMUSCULAR; INTRAVENOUS at 14:27

## 2025-04-12 RX ADMIN — MISOPROSTOL 25 MCG: 100 TABLET ORAL at 00:00

## 2025-04-12 RX ADMIN — SODIUM CHLORIDE, SODIUM LACTATE, POTASSIUM CHLORIDE, AND CALCIUM CHLORIDE: .6; .31; .03; .02 INJECTION, SOLUTION INTRAVENOUS at 11:19

## 2025-04-12 RX ADMIN — SODIUM CHLORIDE, SODIUM LACTATE, POTASSIUM CHLORIDE, AND CALCIUM CHLORIDE 500 ML: .6; .31; .03; .02 INJECTION, SOLUTION INTRAVENOUS at 14:05

## 2025-04-12 RX ADMIN — MISOPROSTOL 25 MCG: 100 TABLET ORAL at 04:00

## 2025-04-12 ASSESSMENT — ACTIVITIES OF DAILY LIVING (ADL)
ADLS_ACUITY_SCORE: 16
ADLS_ACUITY_SCORE: 26
ADLS_ACUITY_SCORE: 16
ADLS_ACUITY_SCORE: 26
ADLS_ACUITY_SCORE: 26
ADLS_ACUITY_SCORE: 16
ADLS_ACUITY_SCORE: 24
ADLS_ACUITY_SCORE: 16
ADLS_ACUITY_SCORE: 26
ADLS_ACUITY_SCORE: 16
ADLS_ACUITY_SCORE: 26
ADLS_ACUITY_SCORE: 26
ADLS_ACUITY_SCORE: 16
ADLS_ACUITY_SCORE: 16
ADLS_ACUITY_SCORE: 22
ADLS_ACUITY_SCORE: 16
ADLS_ACUITY_SCORE: 16
ADLS_ACUITY_SCORE: 22
ADLS_ACUITY_SCORE: 16
ADLS_ACUITY_SCORE: 16
ADLS_ACUITY_SCORE: 22

## 2025-04-12 NOTE — PROGRESS NOTES
Data: Patient admitted to room 229 at 1942. Patient is a . Prenatal record reviewed.   OB History    Para Term  AB Living   1 0 0 0 0 0   SAB IAB Ectopic Multiple Live Births   0 0 0 0 0      # Outcome Date GA Lbr Alejo/2nd Weight Sex Type Anes PTL Lv   1 Current            .  Medical History:   Past Medical History:   Diagnosis Date    BMI 30.0-30.9,adult 2024    Hyperlipidemia LDL goal <100 2024    Hypothyroidism 2012    Hypothyroidism, unspecified type 10/23/2016    Primigravida of advanced maternal age in first trimester 2024    **HR  FOB: Humberto          NATHANIEL: 2025  BT Placenta:  Sex:   Genetic:NIPS/carrier/MFM      Tdap:          Flu:           GBS:     Problems  F/U next visit  -AMA, age 39 @ delivery, MFM referral  -hypothyroid on synthroid  -LDA to be started @ 12-16wks  -needs Pap     AMA > or = 41yo Recommendations:     Offer MFM consult: 24  Offer NIPT: 24  Offer AFP:  Offer weekly NST's beginning    .  Gestational age 39w2d. Vital signs per doc flowsheet. Fetal movement present. Patient reports Cervical Ripening   as reason for admission. Support persons Humberto present.  Action: Verbal consent for EFM, external fetal monitors applied. Admission assessment completed. Patient and support persons educated on labor process. Patient instructed to report change in fetal movement, contractions, vaginal leaking of fluid or bleeding, abdominal pain, or any concerns related to the pregnancy to her nurse/physician. Patient oriented to room, call light in reach.   Response:CNSHERI RICHARDSON Hopes informed of patients arrival, Uterine activity, FHTs, pt is leaking clear fluid. Plan per provider is ROM+ and PO Cytotec for cervical ripening. Patient verbalized understanding of education and verbalized agreement with plan. Patient coping with labor.

## 2025-04-12 NOTE — PROGRESS NOTES
Assumed care of patient approx 1515. Pt comfortable with epidural. Person cath in place. Repositioning patient with peanut ball. Pitocin infusion adjusted to adequate labor pattern. At 1845, anterior lip//-1. Pt and spouse verbalize understanding of plan of care. All questions and concerns addressed. Cont to monitor and assess.

## 2025-04-12 NOTE — PLAN OF CARE
Problem: Labor  Goal: Effective Progression to Delivery  Outcome: Progressing     Patient ambulating in halls and frequently changing positions. On birthing ball, rocking/swaying, pelvic rock, standing at bedside, and on hands & knees in bed. Using music, aromatherapy, heat packs, cold wash cloths, and comb for comfort/pain management. Spouse at bedside and supportive/providing care to patient. Patient focusing inward and vocalizing labor. VSS. FHR category I, jameel q 2-5 minutes, palpate moderate w/soft resting tone. +FM. Pitocin started at 1119 and titrating according to protocol.     Francois Mcadams RN on 4/12/2025 at 1:08 PM

## 2025-04-12 NOTE — PROGRESS NOTES
CNM PROGRESS NOTE    SUBJECTIVE:  Faith reports that she was able to sleep overnight.  Has been feeling some mild cramping intermittently, reports still leaking small amounts of clear fluid.  Humberto is in room and providing support PRN.     OBJECTIVE:  /72 (BP Location: Left arm, Patient Position: Semi-Serrano's, Cuff Size: Adult Regular)   Temp 97.5  F (36.4  C) (Temporal)   Resp 16   LMP 07/10/2024     Fetal heart tones: Baseline 140   Variability: moderate  Accelerations: present  Decelerations: absent    Contractions: Pt is jameel every 3-5 minutes, lasting 30-90 seconds and palpates mild    Cervix: 2/ 70% / -2/mid/soft, Vtx, position verified with BSUS  ROM: clear fluid    Pitocin- none  Antibiotics- none  Cervical ripening: misoprostol x 6 doses PO    ASSESSMENT:  IUP @ 39w3d induction of labor for AMA, cervical ripening completed   GBS- negative  PROM, assumed SROM 4/10 or  (>24 hrs)  AMA  Rubella non-immune  Seth 7     PLAN:   Discussed that after reviewing BPPs, MVP has been stable and assume SROM occurred 4/10 or .  Discussed Seth score of 7, recommend starting IV pitocin augmentation.  Discussed R/B/A/SE of pitocin, agreeable to start IV pitocin per protocol.  Pain medication per request  Anticipate   Labor augmentation with Pitocin per protocol  reevaluate in 2-4 hours and as clinically indicated  Plan for MMR PP    Flower Perez, LUCIA CNM

## 2025-04-12 NOTE — PROGRESS NOTES
CNM PROGRESS NOTE    SUBJECTIVE:  Faith started to feel more painful contractions, requested epidural around 2:30pm.  Now is comfortable with epidural, getting good pain relief.  Humberto is in room providing support PRN.    OBJECTIVE:  /66   Temp 97.8  F (36.6  C) (Temporal)   Resp 16   LMP 07/10/2024   SpO2 99%     Fetal heart tones: Baseline 130   Variability: moderate  Accelerations: present  Decelerations: present, intermittent late decels after epidural, resolved with position changes    Contractions: Pt is jameel every 2-6 minutes, lasting 30-80 seconds and palpates strong    Cervix: 5/ 80-90%/ -2/mid, Vtx, fetal sutures palpated, no forebag palpated  ROM: clear fluid    Pitocin- 4 mu/min.  Antibiotics- none  Cervical ripening: s/p PO misoprostol x 6 doses    ASSESSMENT:  IUP @ 39w3d induction of labor for AMA, now in active labor   GBS- negative  PROM > 24 hrs, clear fluid, afebrile  History of hypothyroidism on synthroid  Rubella non-immune     PLAN:   Frequent position changes to facilitate fetal descent and rotation  Anticipate   Labor augmentation with Pitocin per protocol  reevaluate in 3-4 hrs and as clinically indicated    LUCIA Wall CNM

## 2025-04-12 NOTE — PROVIDER NOTIFICATION
04/11/25 2130   Provider Notification   Provider Name/Title CNM V Hopes   Method of Notification Phone   Notification Reason Membrane Status;Lab/Diagnostic Study     CMN V Hopes Aware of ROM + Positive. Plan per provider is to do PO Cytotec with out a cervical check. Fist dose of cytotec given. Pt and Spouse in agreement with POC.

## 2025-04-12 NOTE — PROGRESS NOTES
VSS, afebrile. 5 doses of PO Cytotec overnight. Reports feeling cramping/pressure in lower abdomen but able to rest well overnight. Continues to leak clear fluid. FHTs cat 1.

## 2025-04-12 NOTE — ANESTHESIA PREPROCEDURE EVALUATION
Anesthesia Pre-Procedure Evaluation    Patient: Faith Crow   MRN: [id number] : 1985        Procedure :           Past Medical History:   Diagnosis Date    BMI 30.0-30.9,adult 2024    Hyperlipidemia LDL goal <100 2024    Hypothyroidism 2012    Hypothyroidism, unspecified type 10/23/2016    Primigravida of advanced maternal age in first trimester 2024    **HR  FOB: Humberto          NATHANIEL: 2025  BT Placenta:  Sex:   Genetic:NIPS/carrier/MFM      Tdap:          Flu:           GBS:     Problems  F/U next visit  -AMA, age 39 @ delivery, MFM referral  -hypothyroid on synthroid  -LDA to be started @ 12-16wks  -needs Pap     AMA > or = 41yo Recommendations:     Offer MFM consult: 24  Offer NIPT: 24  Offer AFP:  Offer weekly NST's beginning       Past Surgical History:   Procedure Laterality Date    NO HISTORY OF SURGERY        No Known Allergies   Social History     Tobacco Use    Smoking status: Never    Smokeless tobacco: Never   Substance Use Topics    Alcohol use: Not Currently      Wt Readings from Last 1 Encounters:   04/10/25 82.2 kg (181 lb 3.2 oz)        Anesthesia Evaluation            ROS/MED HX  ENT/Pulmonary:    (-) asthma   Neurologic:  - neg neurologic ROS     Cardiovascular:    (-) PIH   METS/Exercise Tolerance:     Hematologic:     (+)  no anticoagulation therapy, no coagulopathy,             Musculoskeletal:       GI/Hepatic:     (+) GERD,                   Renal/Genitourinary:       Endo:     (+)          thyroid problem, hypothyroidism,           Psychiatric/Substance Use:       Infectious Disease:       Malignancy:       Other:            Physical Exam    Airway        Mallampati: II   TM distance: > 3 FB   Neck ROM: full     Respiratory Devices and Support         Dental  no notable dental history         Cardiovascular   cardiovascular exam normal          Pulmonary   pulmonary exam normal                OUTSIDE LABS:  CBC:   Lab Results   Component  "Value Date    WBC 13.0 (H) 04/11/2025    WBC 14.3 (H) 03/21/2025    HGB 12.4 04/11/2025    HGB 12.4 03/21/2025    HCT 37.9 04/11/2025    HCT 38.8 03/21/2025     04/11/2025     03/21/2025     BMP:   Lab Results   Component Value Date     09/17/2024     03/23/2023    POTASSIUM 4.1 09/17/2024    POTASSIUM 4.0 03/23/2023    CHLORIDE 105 09/17/2024    CHLORIDE 103 03/23/2023    CO2 21 (L) 09/17/2024    CO2 21 (L) 03/23/2023    BUN 6.3 09/17/2024    BUN 9.6 03/23/2023    CR 0.60 09/17/2024    CR 0.76 03/23/2023    GLC 88 09/17/2024    GLC 94 03/23/2023     COAGS: No results found for: \"PTT\", \"INR\", \"FIBR\"  POC:   Lab Results   Component Value Date    HCG Negative 12/29/2011     HEPATIC:   Lab Results   Component Value Date    ALBUMIN 4.2 09/17/2024    PROTTOTAL 6.9 09/17/2024    ALT 15 09/17/2024    AST 16 09/17/2024    ALKPHOS 52 09/17/2024    BILITOTAL 0.4 09/17/2024     OTHER:   Lab Results   Component Value Date    A1C 5.1 09/20/2024    BARRERA 9.2 09/17/2024    MAG 2.2 01/03/2012    TSH 1.23 12/06/2024    T4 1.68 05/19/2023    T3 141 01/13/2012    CRP <2.9 07/07/2015    SED 18 01/03/2012       Anesthesia Plan    ASA Status:  2       Anesthesia Type: Epidural.              Consents    Anesthesia Plan(s) and associated risks, benefits, and realistic alternatives discussed. Questions answered and patient/representative(s) expressed understanding.     - Discussed:     - Discussed with:  Patient            Postoperative Care            Comments:    Other Comments: Orders to manage the epidural infusion have been entered, and through coordination with the nurse, we will continute to manage and monitor the patient's labor epidural.  We will continuously be available to adjust as needed thruout the entire L&D process.            Ricky Feliz MD    Clinically Significant Risk Factors Present on Admission                 # Drug Induced Platelet Defect: home medication list includes an antiplatelet " medication

## 2025-04-12 NOTE — PROGRESS NOTES
CNM Labor Admission History & Physical    Faith Crow is a 39 year old  with an IUP at 39w2d    Partner/support Person: Humberto  Language: English  Clinic: [facility]  Provider: [facility] CNM team    Faith Crow is admitted to [facility] L&D on 2025 at 8:43 PM       History of present inllness/Chief Complaint:  Here with: induction of labor secondary to AMA, 39 years of age  Patient reports contractions are None      Baby active Yes  Membranes: Patient reports gushes of fluid x 1 week.  She came in for evaluation on  for this concern and Amnisure was negative  Bloody show No   Any changes with medical history since last prenatal visit No      Obstetrical history  Estimated Date of Delivery: 2025 determined by LMP  Patient's last menstrual period was 07/10/2024.   Dating U/S: 24    Fetal anatomic survey: Normal, placenta anterior and low lying.  Resolved at 32w 6d ultrasound on 25  Placenta: Anterior    PRENATAL COURSE  Prenatal care began at 9 wks gestation with regular prenatal care thereafter  Total wt gain 13 lbs, pregravid BMI 30.7  Prenatal Blood Pressure: WNL  Prenatal course was complicated by Hypothyroidism, AMA, Rubella non-immune, low lying placenta(resolved at 32 weeks), alpha thalassemia carrier   Tdap: 25      Patient Active Problem List   Diagnosis    Hypothyroidism, unspecified type    Supervision of high-risk pregnancy    Primigravida of advanced maternal age in third trimester    BMI 30.0-30.9,adult    Rubella non-immune status, antepartum    Supervision of high risk pregnancy in second trimester    Low-lying placenta in second trimester    Primigravida of advanced maternal age in second trimester    Supervision of high risk pregnancy in third trimester       HISTORY  No Known Allergies  Past Medical History:   Diagnosis Date    BMI 30.0-30.9,adult 2024    Hyperlipidemia LDL goal <100 2024    Hypothyroidism 2012     Hypothyroidism, unspecified type 10/23/2016    Primigravida of advanced maternal age in first trimester 2024    **HR  FOB: Humberto          NATHANIEL: 2025  BT Placenta:  Sex:   Genetic:NIPS/carrier/MFM      Tdap:          Flu:           GBS:     Problems  F/U next visit  -AMA, age 39 @ delivery, MFM referral  -hypothyroid on synthroid  -LDA to be started @ 12-16wks  -needs Pap     AMA > or = 39yo Recommendations:     Offer MFM consult: 24  Offer NIPT: 24  Offer AFP:  Offer weekly NST's beginning      Past Surgical History:   Procedure Laterality Date    NO HISTORY OF SURGERY       Family History   Problem Relation Age of Onset    Cardiovascular Father 46         CAD age 46 with MI    Hypertension Father     Hypertension Mother     Thyroid Disease Mother     Cancer Maternal Grandmother         lung cancer    Cerebrovascular Disease Paternal Grandmother      Social History     Tobacco Use    Smoking status: Never    Smokeless tobacco: Never   Substance Use Topics    Alcohol use: Not Currently     OB History    Para Term  AB Living   1 0 0 0 0 0   SAB IAB Ectopic Multiple Live Births   0 0 0 0 0      # Outcome Date GA Lbr Alejo/2nd Weight Sex Type Anes PTL Lv   1 Current                LABS:  Lab Results   Component Value Date    AS Negative 2024    HGB 12.4 2025    HEPBANG Nonreactive 2024    CHPCRT  2011     Negative for C. trachomatis rRNA by transcription mediated amplification.   A negative result by transcription mediated amplification does not preclude the   presence of C. trachomatis infection because results are dependent on proper   and adequate collection, absence of inhibitors, and sufficient rRNA to be   detected.    GCPCRT  2011     Negative for N. gonorrhoeae rRNA by transcription mediated amplification.   A negative result by transcription mediated amplification does not preclude the   presence of N. gonorrhoeae infection because results are  "dependent on proper   and adequate collection, absence of inhibitors, and sufficient rRNA to be   detected.       GBS Status:   No results found for: \"GBS\"  Rubella: Immune  No  HIV: Non-Reactive   1hr GCT:  Elevated, passed 3 hour    ROS   Pt is alert and oriented  Pt denies significant constitutional symptoms including fever and/or malaise.    Pt denies significant respiratory, cardiovacular, GI, or muscular/skeletal complaints.    Neuro: Denies HA and visual changes  Muscoloskeletal: Denies except for discomforts r/t pregnancy     PHYSICAL EXAM:  /77   Temp 96.9  F (36.1  C)   LMP 07/10/2024   General appearance:  healthy, alert, and active   Heart: RRR  Lungs: CTA bilaterally, normal respiratory effort  Abdomen: gravid, single vertex fetus, non-tender, EFW AGA  Legs: reflexes 2+ bilaterally, no clonus, no edema     Contractions: Uterine irritability, patient does not feel contractions    Fetal heart tones: Baseline 130   Variability: moderate   Accelerations: present  Decelerations: absent    NST: reactive    Cervix: Exam deferred due to PROM  Bloody show: no  Membranes:  PROM, clear fluid.  Positive Amnisure on admission.  Not clear when ROM occurred.    ASSESSMENT:  39 year old  with cheatham IUP 39w2d for induction of labor.  Indication AMA, 39 years of age  NST reactive  Membranes: Positive Amnisure on admission.  Not clear when ROM occurred.  AMA  Pregravid BMI 30.72, TWG 13 lbs  Hypothyroidism, taking Synthroid  Alpha thalassemia carrier   Rubella non-immune  GBS negative and membranes intact  B + blood type        PLAN:  Admit to L&D  Initiate PO Cytotec per order set for IOL  Labs ordered: Hemoglobin,  treponema, type and screen, and Amnisure   Limit cervical exams due to PROM.   Regular diet  Therapeutic rest medications ordered and encouraged  Nitrous, IV fentanyl prn, or epidural ok when requested.   Counseled regarding induction of labor with po cytotec.  Reviewed risks and benefits, " questions answered.     Reevaluate in the AM or sooner with change in maternal or fetal status.       LUCIA Verde, ALANA, [facility]    2040............................4/11/25

## 2025-04-12 NOTE — PLAN OF CARE
Problem: Labor  Goal: Effective Progression to Delivery  Outcome: Progressing   Goal Outcome Evaluation:       VSS. Patient resting comfortably after epidural placement. Person placed and patent. Some BP's elevated after epidural placement. Patient asymptomatic and BP's have returned to baseline. Freya q 2-5 minutes, palpate strong w/soft resting tone. FHR w/lates after epidural placement. Patient repositioned w/resolution of late decels. FHR now category I. Provider at bedside and updated on patient's labor progress; reviewed tracing. SVE performed by on-coming RN: 5/80-90/-2 w/bloody show. Reviewed plan of care and labor progress w/patient and SO. Report given to Vicki DONAHUE RN and cares relinquished.    Francois Mcadams RN on 4/12/2025 at 3:32 PM

## 2025-04-13 LAB
ALBUMIN SERPL BCG-MCNC: 3.1 G/DL (ref 3.5–5.2)
ALP SERPL-CCNC: 151 U/L (ref 40–150)
ALT SERPL W P-5'-P-CCNC: 8 U/L (ref 0–50)
ANION GAP SERPL CALCULATED.3IONS-SCNC: 16 MMOL/L (ref 7–15)
AST SERPL W P-5'-P-CCNC: 19 U/L (ref 0–45)
BILIRUB SERPL-MCNC: 0.7 MG/DL
BUN SERPL-MCNC: 8 MG/DL (ref 6–20)
CALCIUM SERPL-MCNC: 9.2 MG/DL (ref 8.8–10.4)
CHLORIDE SERPL-SCNC: 101 MMOL/L (ref 98–107)
CREAT SERPL-MCNC: 0.52 MG/DL (ref 0.51–0.95)
EGFRCR SERPLBLD CKD-EPI 2021: >90 ML/MIN/1.73M2
ERYTHROCYTE [DISTWIDTH] IN BLOOD BY AUTOMATED COUNT: 13.8 % (ref 10–15)
GLUCOSE SERPL-MCNC: 140 MG/DL (ref 70–99)
HCO3 SERPL-SCNC: 15 MMOL/L (ref 22–29)
HCT VFR BLD AUTO: 37.7 % (ref 35–47)
HGB BLD-MCNC: 12.5 G/DL (ref 11.7–15.7)
MCH RBC QN AUTO: 26.1 PG (ref 26.5–33)
MCHC RBC AUTO-ENTMCNC: 33.2 G/DL (ref 31.5–36.5)
MCV RBC AUTO: 79 FL (ref 78–100)
PLATELET # BLD AUTO: 303 10E3/UL (ref 150–450)
POTASSIUM SERPL-SCNC: 3.7 MMOL/L (ref 3.4–5.3)
PROT SERPL-MCNC: 5.7 G/DL (ref 6.4–8.3)
RBC # BLD AUTO: 4.79 10E6/UL (ref 3.8–5.2)
SODIUM SERPL-SCNC: 132 MMOL/L (ref 135–145)
WBC # BLD AUTO: 31.2 10E3/UL (ref 4–11)

## 2025-04-13 PROCEDURE — 85027 COMPLETE CBC AUTOMATED: CPT | Performed by: NURSE PRACTITIONER

## 2025-04-13 PROCEDURE — 120N000013 HC R&B IMCU

## 2025-04-13 PROCEDURE — 36415 COLL VENOUS BLD VENIPUNCTURE: CPT | Performed by: NURSE PRACTITIONER

## 2025-04-13 PROCEDURE — 250N000013 HC RX MED GY IP 250 OP 250 PS 637: Performed by: ADVANCED PRACTICE MIDWIFE

## 2025-04-13 PROCEDURE — 250N000013 HC RX MED GY IP 250 OP 250 PS 637: Performed by: NURSE PRACTITIONER

## 2025-04-13 PROCEDURE — 80053 COMPREHEN METABOLIC PANEL: CPT | Performed by: NURSE PRACTITIONER

## 2025-04-13 RX ORDER — HYDRALAZINE HYDROCHLORIDE 20 MG/ML
10 INJECTION INTRAMUSCULAR; INTRAVENOUS
Status: DISCONTINUED | OUTPATIENT
Start: 2025-04-13 | End: 2025-04-14 | Stop reason: HOSPADM

## 2025-04-13 RX ORDER — SODIUM CHLORIDE, SODIUM LACTATE, POTASSIUM CHLORIDE, CALCIUM CHLORIDE 600; 310; 30; 20 MG/100ML; MG/100ML; MG/100ML; MG/100ML
10-125 INJECTION, SOLUTION INTRAVENOUS CONTINUOUS
Status: DISCONTINUED | OUTPATIENT
Start: 2025-04-13 | End: 2025-04-14 | Stop reason: HOSPADM

## 2025-04-13 RX ORDER — LABETALOL HYDROCHLORIDE 5 MG/ML
20-80 INJECTION, SOLUTION INTRAVENOUS EVERY 10 MIN PRN
Status: DISCONTINUED | OUTPATIENT
Start: 2025-04-13 | End: 2025-04-14 | Stop reason: HOSPADM

## 2025-04-13 RX ADMIN — IBUPROFEN 800 MG: 400 TABLET, FILM COATED ORAL at 21:41

## 2025-04-13 RX ADMIN — IBUPROFEN 800 MG: 400 TABLET, FILM COATED ORAL at 09:55

## 2025-04-13 RX ADMIN — PSYLLIUM HUSK 1 PACKET: 3.4 POWDER ORAL at 08:33

## 2025-04-13 RX ADMIN — LEVOTHYROXINE SODIUM 175 MCG: 175 TABLET ORAL at 08:13

## 2025-04-13 RX ADMIN — IBUPROFEN 800 MG: 400 TABLET, FILM COATED ORAL at 14:04

## 2025-04-13 ASSESSMENT — ACTIVITIES OF DAILY LIVING (ADL)
ADLS_ACUITY_SCORE: 28
ADLS_ACUITY_SCORE: 25
ADLS_ACUITY_SCORE: 28
ADLS_ACUITY_SCORE: 28
ADLS_ACUITY_SCORE: 25
ADLS_ACUITY_SCORE: 24
ADLS_ACUITY_SCORE: 26
ADLS_ACUITY_SCORE: 27
ADLS_ACUITY_SCORE: 28
ADLS_ACUITY_SCORE: 25
ADLS_ACUITY_SCORE: 26
ADLS_ACUITY_SCORE: 28
ADLS_ACUITY_SCORE: 25
ADLS_ACUITY_SCORE: 28
ADLS_ACUITY_SCORE: 27
ADLS_ACUITY_SCORE: 24
ADLS_ACUITY_SCORE: 24
ADLS_ACUITY_SCORE: 27
ADLS_ACUITY_SCORE: 24
ADLS_ACUITY_SCORE: 28
ADLS_ACUITY_SCORE: 28

## 2025-04-13 NOTE — PROGRESS NOTES
CNM PROGRESS NOTE    SUBJECTIVE:  Faith reports feeling some intermittent rectal pressure while in throne position.  Humberto is in room providing support PRN.     OBJECTIVE:  /65   Temp 97.8  F (36.6  C) (Temporal)   Resp 16   LMP 07/10/2024   SpO2 96%     Fetal heart tones: Baseline 125   Variability: moderate  Accelerations: present  Decelerations: absent    Contractions: Pt is jameel every 2-3 minutes, lasting 60-90 seconds and palpates moderate    Cervix: 9.5/ant lip/ 95%/ -1, Vtx  ROM: clear fluid    Pitocin- 6 mu/min.  Antibiotics- none  Cervical ripening:s/p PO misoprostol    ASSESSMENT:  IUP @ 39w3d induction of labor for AMA, in active labor  GBS- negative  PROM > 24 hrs, clear fluid, afebrile  History of hypothyroidism on synthroid  Rubella non-immune    PLAN:   Frequent position changes to facilitate fetal descent and rotation  Anticipate   Labor augmentation with Pitocin per protocol  Reevaluate in 2 hrs or as clinically indicated    Flower Perez, LUCIA CNM

## 2025-04-13 NOTE — L&D DELIVERY NOTE
Faith started actively pushing at .  She pushed in multiple positions with minimal descent from 0 to +1 position.  After 2 hrs of actively pushing, she started to make more progress and more fetal descent was seen.  She continued to push with excellent maternal effort and  of viable infant boy at 2225.  Infant brought to maternal abdomen and was vigorous at 1 min of life.  See delivery note for details.     OB Vaginal Delivery Note    Faith Crow MRN# [id number]   Age: 39 year old YOB: 1985       GA: 39w3d  GP:   Labor Complications:    EBL:   mL  Delivery QBL: 30 mL  Delivery Type: Vaginal, Spontaneous  ROM to Delivery Time: rupture date or rupture time have not been documented   Weight:     1 Minute 5 Minute 10 Minute   Apgar Totals: 8   9        MER MARTIN;MADISON DRISCOLL    Delivery Details:  Faith Crow, a 39 year old  female delivered a viable infant with apgars of 8  and 9 . Patient was fully dilated and pushing after   hours   minutes in active labor. Delivery was via vaginal, spontaneous to a sterile field under epidural anesthesia. Infant delivered in vertex left occiput anterior position. Anterior and posterior shoulders delivered without difficulty. The cord was clamped, cut by FOB Humberto after cessation of pulsation and 3 vessels were noted. Cord blood was obtained in routine fashion with the following disposition: discard.      Cord complications: none  Placenta delivered at 2025 10:29 PM. Placental disposition was Hospital disposal. Fundal massage performed and fundus found to be firm.     Episiotomy: none   Perineum, vagina, cervix were inspected, and the following lacerations were noted:   Perineal lacerations: none               Excellent hemostasis was noted. Needle count correct. Infant and patient in delivery room in good and stable condition.        Carlito Crow-Faith [[id number]]      Labor Event Times      Latent labor onset  date/time: 2025 1006    Active labor onset date: 25 Onset time:  3:30 PM   Dilation complete date: 25 Complete time:  7:50 PM   Start pushing date/time: 2025 2000          Labor Events     labor?: No  Labor Type: Induction/Cervical ripening  Predominate monitoring during 1st stage: continuous electronic fetal monitoring       Rupture date/time:     Fluid color: Clear  Fluid odor: Normal     Induction: Misoprostol  Induction date/time:      Cervical ripening date/time: 25    Indications for induction: Prelabor ROM, Advanced Maternal Age     Augmentation: Oxytocin  Indications for augmentation: Ineffective Contraction Pattern       Delivery/Placenta Date and Time      Delivery Date: 25 Delivery Time: 10:25 PM   Placenta Date/Time: 2025 10:29 PM  Oxytocin given at the time of delivery: after delivery of baby  Delivering clinician: Flower Perez APRN CNM   Other personnel present at delivery:  Provider Role   Manasa Jackson RN Registered Nurse   Madison Driscoll RN Registered Nurse             Vaginal Counts       Initial count performed by 2 team members:  Two Team Members   SHERI Jackosn RN         Needles Suture Needles Sponges (RETIRED) Instruments   Initial counts 2  6    Added to count       Relief counts       Final counts 2  6            Placed during labor Accounted for at the end of labor   FSE NA NA   IUPC NA NA   Cervidil NA NA                  Final count performed by 2 team members:  Two Team Members   KINZA Caban RN      Final count correct?: Yes  Pre-Birth Team Brief: Complete  Post-Birth Team Debrief: Complete       Apgars    Living status: Living   1 Minute 5 Minute 10 Minute 15 Minute 20 Minute   Skin color: 0  1       Heart rate: 2  2       Reflex irritability: 2  2       Muscle tone: 2  2       Respiratory effort: 2  2       Total: 8  9       Apgars assigned by: MADISON DRISCOLL RN       Cord      Vessels: 3 Vessels     Cord Complications: None               Cord Blood Disposition: Discard    Gases Sent?: No    Delayed cord clamping?: Yes    Cord Clamping Delay (seconds):  seconds    Stem cell collection?: No           Labor Events and Shoulder Dystocia    Fetal Tracing Prior to Delivery: Category 2  Fetal Tracing Comments: intermittent decels during 2nd stage  Shoulder dystocia present?: Neg       Delivery (Maternal) (Provider to Complete) (780209)    Episiotomy: None  Perineal lacerations: None    Repair suture: None  Genital tract inspection done: Pos       Blood Loss  Mother: Faith Crow #[id number]     Start of Mother's Information      Delivery Blood Loss   Intrapartum & Postpartum: 25 1530 - 25 2326    Delivery Admission: 25 1942 - 25         Intrapartum & Postpartum Delivery Admission    Delivery QBL (mL) Hospital Encounter 30 mL 30 mL    Total  30 mL 30 mL               End of Mother's Information  Mother: Faith Crow #[id number]                Delivery - Provider to Complete ([id number])    Delivering clinician: Flower Perez APRN CNM  Delivery Type (Choose the 1 that will go to the Birth History): Vaginal, Spontaneous                         Other personnel:  Provider Role   Manasa Jackson RN Registered Nurse   Lakshmi Morris RN Registered Nurse                    Placenta    Date/Time: 2025 10:29 PM  Removal: Spontaneous  Comments: via Streeter mechanism, intact, 3v cord  Disposition: Hospital disposal             Anesthesia    Method: Epidural  Cervical dilation at placement: 4-7                    Presentation and Position    Presentation: Vertex    Position: Left Occiput Anterior                   A:  38yo  s/p  viable infant boy at 39w3d gestation  Lactating mother  PROM > 24hrs, clear fluid and afebrile  rubella non-immune  History of hypothyroid-on levothyroxine    P:  Routine PP cares  Hgb in the AM  Lactation consult  Continue home dosage  of levothyroxine, plan to check TSH @ 6wks PP  MMR prior to discharge  Discharge PPD 1 or 2    LUCIA WallM

## 2025-04-13 NOTE — PROGRESS NOTES
Post-partum Note      S: Patient is doing well today.  Pain is controlled with PO medications.  Tolerating regular diet without nausea or vomiting.  Ambulating without dizziness.  Urinating without difficulty. Lochia normal.  Patient is working on breastfeeding. Latch has been intermittently successful. Provided encouragement to continue working on this.         O:  Patient Vitals for the past 24 hrs:   BP Temp Temp src Pulse Resp SpO2 Weight   04/13/25 0810 133/88 97.7  F (36.5  C) Axillary 93 16 -- --   04/13/25 0628 -- -- -- -- -- -- 78.7 kg (173 lb 6.4 oz)   04/13/25 0459 123/84 98  F (36.7  C) Oral 93 16 -- --   04/13/25 0128 (!) 143/89 98.3  F (36.8  C) Oral 84 16 -- --   04/13/25 0045 139/75 -- -- -- -- 99 % --   04/13/25 0030 137/67 97.6  F (36.4  C) -- -- -- 97 % --   04/13/25 0012 126/61 -- -- -- -- 97 % --   04/13/25 0000 (!) 143/67 -- -- -- -- 98 % --   04/12/25 2345 (!) 152/78 -- -- -- -- 98 % --   04/12/25 2330 (!) 146/78 -- -- -- -- 98 % --   04/12/25 2315 138/74 -- -- -- -- 98 % --   04/12/25 2300 127/69 -- -- -- -- 97 % --   04/12/25 2245 135/74 -- -- -- -- 98 % --   04/12/25 2230 -- -- -- -- -- 97 % --   04/12/25 2215 -- -- -- -- -- 98 % --   04/12/25 2200 131/57 -- -- -- -- 98 % --   04/12/25 2150 131/57 -- -- -- -- -- --   04/12/25 2120 124/72 -- -- -- -- 98 % --   04/12/25 2110 126/74 97.7  F (36.5  C) -- -- -- 97 % --   04/12/25 1900 117/65 -- -- -- -- 96 % --   04/12/25 1845 -- -- -- -- -- 96 % --   04/12/25 1830 131/76 97.8  F (36.6  C) Temporal -- -- 96 % --   04/12/25 1820 -- -- -- -- -- 96 % --   04/12/25 1815 -- -- -- -- -- 97 % --   04/12/25 1800 139/75 -- -- -- -- 96 % --   04/12/25 1745 -- -- -- -- -- 97 % --   04/12/25 1740 -- -- -- -- -- 96 % --   04/12/25 1730 138/75 -- -- -- -- 96 % --   04/12/25 1720 -- -- -- -- -- 96 % --   04/12/25 1715 -- -- -- -- -- 97 % --   04/12/25 1700 131/79 -- -- -- -- 97 % --   04/12/25 1640 -- 97.2  F (36.2  C) Temporal -- -- 98 % --   04/12/25 1630  106/58 -- -- -- -- 97 % --   25 1600 -- -- -- -- -- 98 % --   25 1545 104/55 -- -- -- -- 97 % --   25 1530 104/57 -- -- -- -- 98 % --   25 1520 96/58 -- -- -- -- 99 % --   25 1515 117/66 97.8  F (36.6  C) Temporal -- -- 99 % --   25 1512 119/70 -- -- -- -- 99 % --   25 1510 119/70 -- -- -- -- 99 % --   25 1505 112/61 -- -- -- -- 98 % --   25 1500 115/66 -- -- -- -- 98 % --   25 1455 113/63 -- -- -- -- 99 % --   25 1450 116/64 -- -- -- -- 99 % --   25 1449 116/64 -- -- -- -- 99 % --   25 1445 116/60 -- -- -- -- 99 % --   25 1441 -- -- -- -- -- 100 % --   25 1440 -- -- -- -- -- 100 % --   25 1439 126/71 -- -- -- -- 100 % --   25 1437 119/62 -- -- -- -- 100 % --   25 1435 131/66 -- -- -- -- 100 % --   25 1434 -- -- -- -- -- 100 % --   25 1433 136/71 -- -- -- -- -- --   25 1431 (!) 146/80 -- -- -- -- 100 % --   25 1421 -- -- -- -- -- 100 % --   25 1416 -- -- -- -- -- 100 % --   25 1355 -- 97.8  F (36.6  C) Temporal -- -- -- --   25 1308 122/83 -- -- -- -- -- --   25 1259 -- 97.7  F (36.5  C) -- -- -- -- --   25 1200 -- 97.6  F (36.4  C) Temporal -- -- -- --   25 1114 119/75 97  F (36.1  C) Temporal -- 16 -- --   25 1003 -- 97.5  F (36.4  C) Temporal -- -- -- --       Gen:  Resting comfortably, NAD  Pulm:  Breathing comfortably on room air  Abd:  Soft, appropriately ttp, non-distended.Fundus at U/2, firm and non-tender.  Ext:  non-tender,  bilateral LE edema absent    I/O last 3 completed shifts:  In: 763.2 [I.V.:263.2; IV Piggyback:500]  Out: 1630 [Urine:1600; Blood:30]    Hgb:     Hemoglobin   Date Value Ref Range Status   2025 12.5 11.7 - 15.7 g/dL Final   2025 12.4 11.7 - 15.7 g/dL Final   2018 12.3 11.7 - 15.7 g/dL Final   2015 12.9 11.7 - 15.7 g/dL Final       Assessment/Plan:  39 year old  on PPD #1 s/p .    Intact perineum  Elevated BP in the PP period.  Consider initiating po labetalol if BP continues to be consistently elevated today    Pregnancy complications:  AMA  Pregravid BMI 30.72, TWG 13 lbs  Hypothyroidism, taking Synthroid  Alpha thalassemia carrier     1. Continue with routine postpartum management  2. Pain well controlled  3. QBL: 30 ml ; pre hemoglobin 12.4, post hemogobin 12.5, no symptoms of anemia.   4. B POS, Rubella non-immune  5. Feed: Breastfeeding  6. CV/RESP: WNL  7. DVT PPX: Ambulate    Dispo: Anticipate DC home tomorrow.     Ke Sanchez, LUCIA, ALANA, LEONEL-BC  [facility]  4/13/2025, 9:47 AM

## 2025-04-13 NOTE — PROGRESS NOTES
CNM PROGRESS NOTE    SUBJECTIVE:  Faith reports feeling constant vaginal and rectal pressure, requesting SVE.      OBJECTIVE:  /65   Temp 97.8  F (36.6  C) (Temporal)   Resp 16   LMP 07/10/2024   SpO2 96%     Fetal heart tones: Baseline 125   Variability: moderate  Accelerations: absent  Decelerations: present, intermittent variables      Contractions: Pt is jameel every 1-4 minutes, lasting  seconds and palpates moderate    Cervix: 10/ 100% / 0, Vtx  ROM: clear fluid    Pitocin- 6 mu/min.  Antibiotics- none  Cervical ripening: s/p misoprostol PV    ASSESSMENT:  IUP @ 39w3d second stage labor   GBS- negative  PROM > 24 hrs, clear fluid, afebrile  History of hypothyroidism on synthroid  Rubella non-immune     PLAN:   Start active pushing now  Anticipate   Will reevaluate as clinically indicated    LUCIA WallM

## 2025-04-13 NOTE — PROVIDER NOTIFICATION
04/13/25 0613   Provider Notification   Provider Name/Title SHERI BilljosefinaALANA   Method of Notification Electronic Page   Request Evaluate-Remote   Notification Reason Lab Results     WBC went from 13.0 to 31.2 after delivery. Afebrile, vitals have been stable. she was ruptured for over 24 hours.    CBC ordered for 1100 unless she develops symptoms

## 2025-04-13 NOTE — PROVIDER NOTIFICATION
04/13/25 0012   Provider Notification   Provider Name/Title SHERI Perez CNM   Method of Notification Electronic Page   Request Evaluate - Remote   Notification Reason Maternal Vital Sign Change     SHERI Perez CNM notified of elevated blood pressures. two blood pressures above 140s. 146/78 and 146/67. Labs ordered.    History of Present Illness


Admission Date/PCP: 


  02/10/19 11:09





  TASIA WILSON MD





Patient complains of: DIARRHEA


History of Present Illness: 


JUAN PABLO CAPSER is a 76 year old female with a PMH of HTN, HLD, DM, Breast Ca, 

Colon Ca and arthritis presented to [facility] for a 3 week history of diarrhea. The 

patient report multiple episodes per day of watery diarrhea, frequent episodes 

of stool incontinence, nausea, vomiting, weakness and fatigue. The patient 

states her symptoms started around the time she began taking a new 28 day cycle 

chemotherapy drug (unknown name). She states her oncologist has placed her on 

alternating doses of Immodium and another antidiarrheal medication (unknown 

name), but they offer no relief.  Patient states she was seen at [facility] last week 

and given an antibiotic for the diarrhea which she began yesterday ().  She 

took 1 dose of azithromycin yesterday and 1 today.  There was no change in her 

symptoms. The patient came to the ER today because she stated, "I was so weak, I

couldn't take it anymore."





Upon arrival to the ER, the patient's vitals were BP HR RR T SPO2. Her 

laboratory studies were significant for metabolic acidosis (HCO3 10) pH (7.09), 

hypokalemia (K 2.7) and an RY (Creatinine 1.2 -->baseline 0.8). The patient was

treated with zofran, 1L IVF bolus and KCL IV and PO. The patient appears pale, 

thin and generally ill. She complains of mild abdominal pain, n/v. (+) 

Hyperactive bowel sounds. Abdomen is soft and tender to the lower quadrants.





Plan to admit to hospitalist service for metabolic acidosis, intractable 

diarrhea, N/V. 








Past Medical History


Cardiac Medical History: Reports: Hyperlipidema, Hypertension


   Denies: Coronary Artery Disease, Myocardial Infarction


Pulmonary Medical History: 


   Denies: Asthma, Bronchitis, Chronic Obstructive Pulmonary Disease (COPD), 

Pneumonia


Neurological Medical History: 


   Denies: Seizures


Endocrine Medical History: Reports: Diabetes Mellitus Type 2


   Denies: Diabetes Mellitus Type 1, Hyperthyroidism, Hypothyroidism


Malignancy Medical History: Reports: Breast Cancer, Colorectal Cancer


GI Medical History: Reports: Gastroesophageal Reflux Disease


   Denies: Cirrhosis, Hepatitis, Hiatal Hernia


Musculoskeltal Medical History: Reports: Arthritis


   Denies: Gout


Skin Medical History: 


   Denies: Eczema, Psoriasis - [id number]


Psychiatric Medical History: 


   Denies: Depression


Hematology: Reports: Anemia


   Denies: Sickle Cell Disease, Bleeding Tendencies





Past Surgical History


Past Surgical History: Reports: Appendectomy,  Section, Mastectomy - 

LUMPECTOMY, Orthopedic Surgery - Right foot surgery for Miller's neuroma, Other


   Denies: Amputation, Hysterectomy, Pacemaker





Social History


Information Source: Patient


Lives with: Family


Smoking Status: Never Smoker


Frequency of Alcohol Use: Rare


Hx Recreational Drug Use: No


Drugs: None


Hx Prescription Drug Abuse: No





Family History


Family History: Reviewed & Not Pertinent, Other - Unobtainable


Parental Family History Reviewed: Yes


Children Family History Reviewed: Yes


Sibling(s) Family History Reviewed.: Yes





Medication/Allergy


Home Medications: 








Diltiazem HCl [Diltiazem 24Hr ER] 360 mg PO DAILY 18 


Metformin HCl [Glucophage 500 mg Tablet] 1,000 mg PO BID 18 


Pantoprazole Sodium [Protonix] 40 mg PO DAILY 18 


Pioglitazone HCl [Actos] 30 mg PO DAILY 18 


Potassium Chloride [Klor-Con M20] 40 meq PO DAILY 18 


Telmisartan [Micardis 80 mg Tablet] 80 mg PO DAILY 18 


Cyanocobalamin (Vitamin B-12) [B-12] 1,000 mcg PO DAILY 19 


Multivit-Min/Iron/Folic/Lutein [Centrum Silver Women Tablet] 1 each PO DAILY 

19 


Atorvastatin Calcium [Lipitor 20 mg Tablet] 20 mg PO QHS 02/10/19 


Ferrous Sulfate [Feosol] 325 mg PO DAILY 02/10/19 


Ondansetron HCl [Zofran 8 mg Tablet] 8 mg PO Q8HP PRN 02/10/19 








Allergies/Adverse Reactions: 


                                        





oxycodone HCl [From Percocet] Allergy (Severe, Verified 02/10/19 09:21)


   Nightmares


adhesive tape Adverse Reaction (Severe, Verified 02/10/19 09:21)


   Generalized rash











Physical Exam


Vital Signs: 


                                        











Temp Pulse Resp BP Pulse Ox


 


 97.3 F   106 H  15   151/65 H  100 


 


 02/10/19 08:56  02/10/19 08:57  02/10/19 13:01  02/10/19 13:01  02/10/19 13:01








                                 Intake & Output











 02/09/19 02/10/19 02/11/19





 06:59 06:59 06:59


 


Intake Total   


 


Balance   


 


Weight   61 kg














Results


Laboratory Results: 


                                        





                                 02/10/19 09:30 





                                 02/10/19 09:02 





                                        











  02/10/19 02/10/19 02/10/19





  09:02 09:02 09:30


 


WBC  Cancelled  


 


RBC  Cancelled  


 


Hgb  Cancelled  


 


Hct  Cancelled  


 


MCV  Cancelled  


 


MCH  Cancelled  


 


MCHC  Cancelled  


 


RDW  Cancelled  


 


Plt Count  Cancelled  


 


Seg Neutrophils %  Cancelled  


 


Lymphocytes %  Cancelled  


 


Monocytes %  Cancelled  


 


Eosinophils %  Cancelled  


 


Basophils %  Cancelled  


 


Absolute Neutrophils  Cancelled  


 


Absolute Lymphocytes  Cancelled  


 


Absolute Monocytes  Cancelled  


 


Absolute Eosinophils  Cancelled  


 


Absolute Basophils  Cancelled  


 


VBG pH   


 


VBG pCO2   


 


VBG HCO3   


 


VBG Base Excess   


 


Sodium   136.1 L 


 


Potassium   2.4 L* 


 


Chloride   112 H 


 


Carbon Dioxide   10 L* 


 


Anion Gap   14 


 


BUN   30 H 


 


Creatinine   1.23 


 


Est GFR ( Amer)   51 L 


 


Est GFR (Non-Af Amer)   42 L 


 


Glucose   293 H 


 


Lactic Acid    1.8


 


Calcium   9.9 


 


Total Bilirubin   0.6 


 


AST   22 


 


ALT   38 


 


Alkaline Phosphatase   89 


 


Total Protein   6.4 


 


Albumin   3.6 














  02/10/19 02/10/19





  09:30 09:30


 


WBC   4.7


 


RBC   4.81


 


Hgb   14.2


 


Hct   41.9


 


MCV   87


 


MCH   29.5


 


MCHC   33.9


 


RDW   23.4 H


 


Plt Count   272  D


 


Seg Neutrophils %   Not Reportable


 


Lymphocytes %   Not Reportable


 


Monocytes %   Not Reportable


 


Eosinophils %   Not Reportable


 


Basophils %   Not Reportable


 


Absolute Neutrophils   Not Reportable


 


Absolute Lymphocytes   Not Reportable


 


Absolute Monocytes   Not Reportable


 


Absolute Eosinophils   Not Reportable


 


Absolute Basophils   Not Reportable


 


VBG pH  7.09 L* 


 


VBG pCO2  37.6 


 


VBG HCO3  11.1 L 


 


VBG Base Excess  -18.0 


 


Sodium  


 


Potassium  


 


Chloride  


 


Carbon Dioxide  


 


Anion Gap  


 


BUN  


 


Creatinine  


 


Est GFR ( Amer)  


 


Est GFR (Non-Af Amer)  


 


Glucose  


 


Lactic Acid  


 


Calcium  


 


Total Bilirubin  


 


AST  


 


ALT  


 


Alkaline Phosphatase  


 


Total Protein  


 


Albumin  








                                        











  02/10/19





  09:02


 


Troponin I  0.028











Impressions: 


                                        





Chest X-Ray  02/10/19 09:00


IMPRESSION:  NO SIGNIFICANT RADIOGRAPHIC FINDING IN THE CHEST.


 














Assessment & Plan





- Diagnosis


(1) Metabolic acidosis


Is this a current diagnosis for this admission?: Yes   


Plan: 


Secondary to profuse diarrhea


VBG: pH 7.09


On chemistry HCO3 is 10


No plan for Bicarb gtt at this time


Potassium repletion 


q12h chemistries until hypokalemia is corrected


Maintenance IVF D5 1/2 NS 20meq @ 100mL/hr








(2) Hypokalemia


Is this a current diagnosis for this admission?: Yes   


Plan: 


Secondary to diarrhea


replace potassiu and magnesium


q12h chemistries











(3) Colon cancer


Qualifiers: 


   Colon location: unspecified part of colon   Qualified Code(s): C18.9 - 

Malignant neoplasm of colon, unspecified   


Is this a current diagnosis for this admission?: Yes   


Plan: 


Management per Heme/Onc


Consulted Dr. Christianson








(4) Diarrhea


Qualifiers: 


   Diarrhea type: unspecified type   Qualified Code(s): R19.7 - Diarrhea, 

unspecified   


Is this a current diagnosis for this admission?: Yes   


Plan: 


Likely a side effect of medication


Awaiting stool cultures


Hesitant to start antimotility agent until stool cultures are negative 








(5) HLD (hyperlipidemia)


Qualifiers: 


   Hyperlipidemia type: unspecified   Qualified Code(s): E78.5 - Hyperlipidemia,

unspecified   


Is this a current diagnosis for this admission?: Yes   


Plan: 


History of HTN


resume home dose statin








(6) Hypertension


Qualifiers: 


   Hypertension type: essential hypertension   Qualified Code(s): I10 - 

Essential (primary) hypertension   


Plan: 


History of HTN


Resume home dose anti-hypertensives








- Time


Time Spent: 30 to 50 Minutes


Medications reviewed and adjusted accordingly: Yes


Anticipated discharge: Home





- Inpatient Certification


Based on my medical assessment, after consideration of the patient's 

comorbidities, presenting symptoms, or acuity I expect that the services needed 

warrant INPATIENT care.: Yes


I certify that my determination is in accordance with my understanding of 

Medicare's requirements for reasonable and necessary INPATIENT services [42 CFR 

412.3e].: Yes


Medical Necessity: Need For IV Fluids, Risk of Complication if Not Cared For in 

Hospital





- Plan Summary


Plan Summary: 





Admit to hospitalist service. ELECTROLYTE REPLACEMENT. IVF WITH KCL. 

ANTIEMETICS.

## 2025-04-13 NOTE — PROGRESS NOTES
Data: Faith Crow transferred to [id number] via wheelchair at 115. Baby transferred via parent's arms.  Action: Receiving unit notified of transfer: Yes. Patient and family notified of room change. Report given to Yang MCKOY RN at 0115. Belongings sent to receiving unit. Accompanied by Registered Nurse. Oriented patient to surroundings. Call light within reach. ID bands double-checked with receiving RN.  Response: Patient tolerated transfer and is stable.

## 2025-04-13 NOTE — LACTATION NOTE
Initial Lactation visit with Faith, FREDRICK, and baby boy. Faith reports feeding is going ok. Baby has been sleepy at breast. Baby has had a few latches. Faith is using a nipple shield to help with latching. Baby is due to feed and placed in a football hold on the left side. Baby is able to latch. Baby has a few suck burst, but not consistent sucking. Deep latch noted, but no swallows. Discussed the feelings and looks of a deep latch. Discussed nipple shape immediately after baby unlatches. Discussed if baby is not having consistent sucking with feedings, that Faith should start pumping. Discussed cluster feeding, what it is and when to expect it, The Second Night, satiety cues, feeding cues, and reviewed Feeding Log for home use. Encouraged to review Breastfeeding section in Your Guide to Postpartum & [place] [facility].    Reviewed milk supply and engorgement. Reviewed typical timeline of milk supply initiation and progression over first 3-5 days postpartum. Discussed comfort measures for engorgement, plugged duct treatment, and warning signs of breast infection. General questions answered regarding pumping, when it's helpful and necessary. Reviewed general recommendation to wait to start pumping until breastfeeding is well established unless there are feeding difficulties or engorgement not relieved by feeding baby or hand expression. Discussed introducing a bottle and recommendation to wait for bottle introduction for 3-4 weeks unless baby needs to supplement for medical reasons.    Feeding plan: Recommend unlimited, frequent breast feedings: At least 8 - 12 times every 24 hours. Avoid pacifiers and supplementation with formula unless medically indicated. Encouraged use of feeding log and to record feedings, and void/stool patterns. Faith has a breast pump for home use. Reviewed outpatient lactation resources. Will revisit if needed.    Negrita Armstrong RN, IBCLC

## 2025-04-13 NOTE — PLAN OF CARE
Goal Outcome Evaluation:    Pt, support person, and infant transferred to unit at 0115 accompanied by labor RN.  ID bands double verified.  Pt oriented to room and call light placed within reach.  Safety protocols including band checks, safe sleep practices, and bulb syringe use reviewed. Pt verbally acknowledge understanding of teaching. Encouraged to call with questions and concerns.       Vital signs stable, assessment WNL. Fundus firm, midline, U.  Scant lochia rubra. Pain controlled with ibuprofen; states satisfaction with pain management. Up in room independently with no complaints of dizziness. Pt able to empty bladder x1 for >200. Straight cath x1 in the earlier part of the night.  at bedside and supportive. Working on Breastfeeding every 2-3 hours. Encouraged to call RN with needs, call light within reach.

## 2025-04-14 VITALS
TEMPERATURE: 98.1 F | DIASTOLIC BLOOD PRESSURE: 79 MMHG | HEART RATE: 76 BPM | RESPIRATION RATE: 16 BRPM | WEIGHT: 176.1 LBS | OXYGEN SATURATION: 98 % | SYSTOLIC BLOOD PRESSURE: 131 MMHG | BODY MASS INDEX: 32.21 KG/M2

## 2025-04-14 PROBLEM — O13.9 GESTATIONAL HYPERTENSION, ANTEPARTUM: Status: ACTIVE | Noted: 2025-04-14

## 2025-04-14 PROBLEM — O13.9 GESTATIONAL HYPERTENSION: Status: ACTIVE | Noted: 2025-04-14

## 2025-04-14 LAB
ERYTHROCYTE [DISTWIDTH] IN BLOOD BY AUTOMATED COUNT: 14.2 % (ref 10–15)
HCT VFR BLD AUTO: 34.9 % (ref 35–47)
HGB BLD-MCNC: 11.8 G/DL (ref 11.7–15.7)
MCH RBC QN AUTO: 27.1 PG (ref 26.5–33)
MCHC RBC AUTO-ENTMCNC: 33.8 G/DL (ref 31.5–36.5)
MCV RBC AUTO: 80 FL (ref 78–100)
PLATELET # BLD AUTO: 344 10E3/UL (ref 150–450)
RBC # BLD AUTO: 4.36 10E6/UL (ref 3.8–5.2)
WBC # BLD AUTO: 19.1 10E3/UL (ref 4–11)

## 2025-04-14 PROCEDURE — 250N000013 HC RX MED GY IP 250 OP 250 PS 637: Performed by: NURSE PRACTITIONER

## 2025-04-14 PROCEDURE — 85014 HEMATOCRIT: CPT | Performed by: NURSE PRACTITIONER

## 2025-04-14 PROCEDURE — 36415 COLL VENOUS BLD VENIPUNCTURE: CPT | Performed by: NURSE PRACTITIONER

## 2025-04-14 PROCEDURE — 250N000013 HC RX MED GY IP 250 OP 250 PS 637: Performed by: ADVANCED PRACTICE MIDWIFE

## 2025-04-14 RX ORDER — IBUPROFEN 800 MG/1
800 TABLET, FILM COATED ORAL EVERY 6 HOURS PRN
COMMUNITY
Start: 2025-04-14

## 2025-04-14 RX ORDER — ACETAMINOPHEN 325 MG/1
650 TABLET ORAL EVERY 4 HOURS PRN
COMMUNITY
Start: 2025-04-14

## 2025-04-14 RX ORDER — DOCUSATE SODIUM 100 MG/1
100 CAPSULE, LIQUID FILLED ORAL 2 TIMES DAILY
Qty: 15 CAPSULE | Refills: 0 | Status: SHIPPED | OUTPATIENT
Start: 2025-04-14

## 2025-04-14 RX ADMIN — PSYLLIUM HUSK 1 PACKET: 3.4 POWDER ORAL at 11:45

## 2025-04-14 RX ADMIN — LEVOTHYROXINE SODIUM 175 MCG: 175 TABLET ORAL at 08:11

## 2025-04-14 RX ADMIN — IBUPROFEN 800 MG: 400 TABLET, FILM COATED ORAL at 06:45

## 2025-04-14 RX ADMIN — SENNOSIDES AND DOCUSATE SODIUM 2 TABLET: 50; 8.6 TABLET ORAL at 11:45

## 2025-04-14 ASSESSMENT — ACTIVITIES OF DAILY LIVING (ADL)
ADLS_ACUITY_SCORE: 28

## 2025-04-14 NOTE — ANESTHESIA POSTPROCEDURE EVALUATION
Patient: Faith Crow    Procedure: * No procedures listed *       Anesthesia Type:  Epidural    Note:  Disposition: Inpatient   Postop Pain Control: Uneventful            Sign Out: Well controlled pain   PONV: No   Neuro/Psych: Uneventful            Sign Out: Acceptable/Baseline neuro status   Airway/Respiratory: Uneventful            Sign Out: Acceptable/Baseline resp. status   CV/Hemodynamics: Uneventful            Sign Out: Acceptable CV status   Other NRE: NONE   DID A NON-ROUTINE EVENT OCCUR? No           Last vitals:  Vitals:    04/13/25 1645 04/13/25 2015 04/14/25 0000   BP: 130/82 128/84 (!) 147/97   Pulse: 95 90 85   Resp: 16 16 16   Temp: 36.7  C (98  F) 36.6  C (97.9  F) 36.7  C (98  F)   SpO2:          Electronically Signed By: Michael Morrissey MD  April 14, 2025  3:37 AM

## 2025-04-14 NOTE — PLAN OF CARE
Goal Outcome Evaluation:       D: VSS, assessments WDL.   I: Pt. received complete discharge paperwork.  Pt. was given times of last dose for all discharge medications in writing on discharge medication sheets.  Discharge teaching included home medication, pain management, activity restrictions, postpartum cares, and signs and symptoms of infection.    A: Discharge outcomes on care plan met.  Mother states understanding and comfort with self care and follow up care.   P: Pt. Discharged.  Pt. was accompanied by  and left with personal belongings.  Pt. to follow up with OB provider per discharge instructions.  Pt. had no further questions at the time of discharge and no unmet needs were identified.

## 2025-04-14 NOTE — PROGRESS NOTES
CNM Postpartum Discharge Note    SIGNIFICANT PROBLEMS:  Patient Active Problem List   Diagnosis Code    Hypothyroidism, unspecified type E03.9    Supervision of high-risk pregnancy O09.90    Primigravida of advanced maternal age in third trimester O09.513    BMI 30.0-30.9,adult Z68.30    Rubella non-immune status, antepartum O09.899, Z28.39    Supervision of high risk pregnancy in second trimester O09.92    Low-lying placenta in second trimester O44.42    Primigravida of advanced maternal age in second trimester O09.512    Supervision of high risk pregnancy in third trimester O09.93    Encounter for induction of labor Z34.90    Prolonged rupture of membranes O42.90     (normal spontaneous vaginal delivery) O80    Lactating mother Z39.1    Gestational hypertension, antepartum O13.9         SUBJECTIVE:  Patient is stable and is tolerating acitivity well  Baby is rooming in  Complications since 2 hours post delivery: None  Pain is well controlled.  Patient is taking pain medications.  Breastfeeding status:initiated   Elimination:  She is voiding without difficulty.  She has not had a bowel movement  Denies heavy bleeding and passing large clots.  Denies HA/vision change, RUQ pain    INTERVAL HISTORY:  /79   Pulse 76   Temp 98.1  F (36.7  C) (Axillary)   Resp 16   Wt 79.9 kg (176 lb 1.6 oz)   LMP 07/10/2024   SpO2 98%   Breastfeeding Unknown   BMI 32.21 kg/m      Constitutional: healthy, alert, and no distress  Breasts: Currently dressed to discharge  Fundus: Uterine fundus is firm, non-tender and at 2 cm below the level of the umbilicus per RN charting  Perineum: Perineum is intact   Lochia: Lochia is appropriate for the duration of time since delivery per RN charting    Postpartum hemoglobin   Hemoglobin   Date Value Ref Range Status   2025 11.8 11.7 - 15.7 g/dL Final   2018 12.3 11.7 - 15.7 g/dL Final     Blood type B pos  Rubella status Non-immune  History of depression:   no    ASSESSMENT/PLAN:  Stable Post-partum day #2  Gestational Hypertension -  not currently dn BP medications with labs WNL. Agreeable to PP monitoring with home BP cuff, home health visit, and 1 week RN visit.   Rubella NI - reviewed recommendation for administration of MMR vaccine. She declines today.    Postpartum warning s/s reviewed, including bleeding/clots, fever, mastitis & thromboemboli   Exercise, diet and rest reviewed  PP Kegels/crunches reviewed  Continue prenatal vitamins while breastfeeding    Educated on postpartum blues and postpartum depression warnings signs/symptoms  Follow-up in 1 week with RN for BP check; 2 and 6 weeks with CNMs at [facility]  Plan d/c home today    LUCIA BallesterosM

## 2025-04-14 NOTE — PLAN OF CARE
Goal Outcome Evaluation:      Plan of Care Reviewed With: patient, spouse    Overall Patient Progress: improvingOverall Patient Progress: improving     Vital signs stable, assessment WNL. Pain controlled with ibuprofen; states satisfaction with pain management. Up in room independently with no complaints of dizziness, voiding without difficulty.  at bedside and supportive. Working on breasfeeding, using a nipple shield, double electric breast pump started when  consistently sleepy. MMR vaccine sheet provided, patient declines at this time. Encouraged to call RN with needs, call light within reach.

## 2025-04-14 NOTE — PLAN OF CARE
Goal Outcome Evaluation:      Plan of Care Reviewed With: patient    Overall Patient Progress: improvingOverall Patient Progress: improving       Vital signs stable. Postpartum assessment WDL. Pain controlled with Ibuprofenminim. Patient voiding without difficulty. Breastfeeding on cue with minimal assist, using shield. Patient and infant bonding well. Will continue with current plan of care.

## 2025-04-14 NOTE — DISCHARGE INSTRUCTIONS
Warning Signs after Having a Baby    Keep this paper on your fridge or somewhere else where you can see it.    Call your provider if you have any of these symptoms up to 12 weeks after having your baby.    Thoughts of hurting yourself or your baby  Pain in your chest or trouble breathing  Severe headache not helped by pain medicine  Eyesight concerns (blurry vision, seeing spots or flashes of light, other changes to eyesight)  Fainting, shaking or other signs of a seizure    Call 9-1-1 if you feel that it is an emergency.     The symptoms below can happen to anyone after giving birth. They can be very serious. Call your provider if you have any of these warning signs.    My provider s phone number: _______________________    Losing too much blood (hemorrhage)    Call your provider if you soak through a pad in less than an hour or pass blood clots bigger than a golf ball. These may be signs that you are bleeding too much.    Blood clots in the legs or lungs    After you give birth, your body naturally clots its blood to help prevent blood loss. Sometimes this increased clotting can happen in other areas of the body, like the legs or lungs. This can block your blood flow and be very dangerous.     Call your provider if you:  Have a red, swollen spot on the back of your leg that is warm or painful when you touch it.   Are coughing up blood.     Infection    Call your provider if you have any of these symptoms:  Fever of 100.4 F (38 C) or higher.  Pain or redness around your stitches if you had an incision.   Any yellow, white, or green fluid coming from places where you had stitches or surgery.    Mood Problems (postpartum depression)    Many people feel sad or have mood changes after having a baby. But for some people, these mood swings are worse.     Call your provider right away if you feel so anxious or nervous that you can't care for yourself or your baby.    Preeclampsia (high blood pressure)    Even if you  "didn't have high blood pressure when you were pregnant, you are at risk for the high blood pressure disease called preeclampsia. This risk can last up to 12 weeks after giving birth.     Call your provider if you have:   Pain on your right side under your rib cage  Sudden swelling in the hands and face    Remember: You know your body. If something doesn't feel right, get medical help.     For informational purposes only. Not to replace the advice of your health care provider. Copyright 2020 [employer] [facility]. All rights reserved. Clinically reviewed by Umu Rodriguez, RNC-OB, MSN. [employer] [id number] - Rev .    Postpartum Care at Home With Your Baby: Care Instructions  Overview     After childbirth (postpartum period), your body goes through many changes as you recover. In these weeks after delivery, try to take good care of yourself. Get rest whenever you can and accept help from others.  It may take 4 to 6 weeks to feel like yourself again, and possibly longer if you had a  birth. You may feel sore or very tired as you recover. After delivery, you may continue to have contractions as the uterus returns to the size it was before your pregnancy. You will also have some vaginal bleeding. And you may have pain around the vagina as you heal. Several days after delivery you may also have pain and swelling in your breasts as they fill with milk. There are things you can do at home to help ease these discomforts.  After childbirth, it's common to feel emotional. You may feel irritable, cry easily, and feel happy one minute and sad the next. This is called the \"baby blues.\" Hormone changes are one cause of these emotional changes. These feelings usually get better within a couple of weeks. If they don't, talk to your doctor or midwife.  In the first couple of weeks after you give birth, your doctor or midwife may want to check in with you and make a plan for follow-up care. You will likely have a " complete postpartum visit in the first 3 months after delivery. At that time, your doctor or midwife will check on your recovery and see how you're doing. But if you have questions or concerns before then, you can always call your doctor or midwife.  Follow-up care is a key part of your treatment and safety. Be sure to make and go to all appointments, and call your doctor if you are having problems. It's also a good idea to know your test results and keep a list of the medicines you take.  How can you care for yourself at home?  Taking care of your body  Use pads instead of tampons for bleeding. After birth, you will have bloody vaginal discharge. You may also pass some blood clots that shouldn't be bigger than an egg. Over the next 6 weeks or so, your bleeding should decrease a little every day and slowly change to a pinkish and then whitish discharge.  For cramps or mild pain, try an over-the-counter pain medicine, such as acetaminophen (Tylenol) or ibuprofen (Advil, Motrin). Read and follow all instructions on the label.  To ease pain around the vagina or from hemorrhoids:  Put ice or a cold pack on the area for 10 to 20 minutes at a time. Put a thin cloth between the ice and your skin.  Try sitting in a few inches of warm water (sitz bath) when you can or after bowel movements.  Clean yourself with a gentle squeeze of warm water from a bottle instead of wiping with toilet paper.  Use witch hazel or hemorrhoid pads (such as Tucks).  Try using a cold compress for sore and swollen breasts. And wear a supportive bra that fits.  Ease constipation by drinking plenty of fluids and eating high-fiber foods. Ask your doctor or midwife about over-the-counter stool softeners.  Activity  Rest when you can.  Ask for help from family or friends when you need it.  If you can, have another adult in your home for at least 2 or 3 days after birth.  When you feel ready, try to get some exercise every day. For many people, walking  is a good choice. Don't do any heavy exercise until your doctor or midwife says it's okay.  Ask your doctor or midwife when it is okay to have vaginal sex.  If you don't want to get pregnant, talk to your doctor or midwife about birth control options. You can get pregnant even before your period returns. Also, you can get pregnant while you are breastfeeding.  Talk to your doctor or midwife if you want to get pregnant again. They can talk to you about when it is safe.  Emotional health  It's normal to have some sadness, anxiety, and mood swings after delivery. It may help to talk with a trusted friend or family member. You can also call the Maternal Mental Health Hotline at 6-373-[facility] (1-574.385.1376) for support. If these mood changes last more than a couple of weeks, talk to your doctor or midwife.  When should you call for help?  Share this information with your partner, family, or a friend. They can help you watch for warning signs.  Call 911  anytime you think you may need emergency care. For example, call if:    You feel you cannot stop from hurting yourself, your baby, or someone else.     You passed out (lost consciousness).     You have chest pain, are short of breath, or cough up blood.     You have a seizure.   Where to get help 24 hours a day, 7 days a week   If you or someone you know talks about suicide, self-harm, a mental health crisis, a substance use crisis, or any other kind of emotional distress, get help right away. You can:    Call the Suicide and Crisis Lifeline at 278.     Call 5-992-449-TALK (1-311.277.7294).     Text HOME to 109974 to access the Crisis Text Line.   Consider saving these numbers in your phone.  Go to [employer] for more information or to chat online.  Call your doctor or midwife now or seek immediate medical care if:    You have signs of hemorrhage (too much bleeding), such as:  Heavy vaginal bleeding. This means that you are soaking through one or more pads in an  "hour. Or you pass blood clots bigger than an egg.  Feeling dizzy or lightheaded, or you feel like you may faint.  Feeling so tired or weak that you cannot do your usual activities.  A fast or irregular heartbeat.  New or worse belly pain.     You have signs of infection, such as:  A fever.  Increased pain, swelling, warmth, or redness from an incision or wound.  Frequent or painful urination or blood in your urine.  Vaginal discharge that smells bad.  New or worse belly pain.     You have symptoms of a blood clot in your leg (called a deep vein thrombosis), such as:  Pain in the calf, back of the knee, thigh, or groin.  Swelling in the leg or groin.  A color change on the leg or groin. The skin may be reddish or purplish, depending on your usual skin color.     You have signs of preeclampsia, such as:  Sudden swelling of your face, hands, or feet.  New vision problems (such as dimness, blurring, or seeing spots).  A severe headache.     You have signs of heart failure, such as:  New or increased shortness of breath.  New or worse swelling in your legs, ankles, or feet.  Sudden weight gain, such as more than 2 to 3 pounds in a day or 5 pounds in a week.  Feeling so tired or weak that you cannot do your usual activities.     You had spinal or epidural pain relief and have:  New or worse back pain.  Increased pain, swelling, warmth, or redness at the injection site.  Tingling, weakness, or numbness in your legs or groin.   Watch closely for changes in your health, and be sure to contact your doctor or midwife if:    Your vaginal bleeding isn't decreasing.     You feel sad, anxious, or hopeless for more than a few days.     You are having problems with your breasts or breastfeeding.   Where can you learn more?  Go to https://www.healthwise.net/patiented  Enter Z768 in the search box to learn more about \"Postpartum Care at Home With Your Baby: Care Instructions.\"  Current as of: April 30, 2024  Content Version: 14.4    " [id number] [employer].   Care instructions adapted under license by your healthcare professional. If you have questions about a medical condition or this instruction, always ask your healthcare professional. [employer] disclaims any warranty or liability for your use of this information.

## 2025-04-14 NOTE — DISCHARGE SUMMARY
[facility] Discharge Summary    Faith Crow MRN# [id number]   Age: 39 year old YOB: 1985     Date of Admission:  2025  Date of Discharge::  2025  Admitting Physician:  No admitting provider for patient encounter.  Discharge Physician:  LUCIA Ballesteros CNM     Home clinic: [facility] [place]          Admission Diagnoses:   Supervision of high risk pregnancy in third trimester [O09.93]  Primigravida of advanced maternal age in third trimester [O09.513]          Discharge Diagnosis:     Normal spontaneous vaginal delivery  Lactating mother   Gestational hypertension, blood pressure controlled without medications  Rubella non-immune, declines MMR vaccination while inpatient          Procedures:     Procedure(s): No additional procedures performed              Medications Prior to Admission:     Medications Prior to Admission   Medication Sig Dispense Refill Last Dose/Taking    levothyroxine (SYNTHROID/LEVOTHROID) 175 MCG tablet Take 175 mcg by mouth daily.   2025    Prenatal-FeFum-FA-DHA w/o A (PRENATAL + DHA PO) Take 1 tablet by mouth daily. [facility] Prenatal   2025    [DISCONTINUED] aspirin (ASA) 81 MG chewable tablet Take 81 mg by mouth daily.   4/10/2025    [DISCONTINUED] vitamin B-12 (CYANOCOBALAMIN) 2500 MCG sublingual tablet Take 1 tablet (2,500 mcg) by mouth daily.   2025             Discharge Medications:     Current Discharge Medication List        START taking these medications    Details   acetaminophen (TYLENOL) 325 MG tablet Take 2 tablets (650 mg) by mouth every 4 hours as needed for fever or other (second line or per patient preference for mild to moderate pain management).    Associated Diagnoses:  (normal spontaneous vaginal delivery)      docusate sodium (COLACE) 100 MG capsule Take 1 capsule (100 mg) by mouth 2 times daily.  Qty: 15 capsule, Refills: 0    Associated Diagnoses:  (normal spontaneous vaginal  delivery)      ibuprofen (ADVIL/MOTRIN) 800 MG tablet Take 1 tablet (800 mg) by mouth every 6 hours as needed for other (first line or per patient preference for mild to moderate pain management).    Associated Diagnoses:  (normal spontaneous vaginal delivery)           CONTINUE these medications which have NOT CHANGED    Details   levothyroxine (SYNTHROID/LEVOTHROID) 175 MCG tablet Take 175 mcg by mouth daily.      Prenatal-FeFum-FA-DHA w/o A (PRENATAL + DHA PO) Take 1 tablet by mouth daily. [facility]ren[facility]           STOP taking these medications       aspirin (ASA) 81 MG chewable tablet Comments:   Reason for Stopping:         vitamin B-12 (CYANOCOBALAMIN) 2500 MCG sublingual tablet Comments:   Reason for Stopping:                     Consultations:   No consultations were requested during this admission          Brief History of Labor:   Faith presented to [facility] for IOL secondary to AMA. She was ruptured on admission and was induced with PO misoprostol and pitocin augmentation. She labored with epidural anesthesia and delivered a viable male infant on 2025 at 22:25.            Hospital Course:   The patient's hospital course was unremarkable.  On discharge, her pain was well controlled. Vaginal bleeding is similar to peak menstrual flow.  Voiding without difficulty.  Ambulating well and tolerating a normal diet.  No fever.  Breastfeeding well with nipple shield.  Infant is stable.  No bowel movement yet.  She was discharged on post-partum day #2.    Post-partum hemoglobin:   Hemoglobin   Date Value Ref Range Status   2025 11.8 11.7 - 15.7 g/dL Final   2018 12.3 11.7 - 15.7 g/dL Final             Discharge Instructions and Follow-Up:     Discharge diet: Regular   Discharge activity: Activity as tolerated   Discharge follow-up: Follow up with clinic RN in 1 week and primary care provider in 2 and 6 weeks   Wound care: Drink plenty of fluids  Keep wound clean and dry           Discharge  Disposition:     Discharged to home      Attestation:  I have reviewed today's vital signs, notes, medications, labs and imaging.    LUCIA Ballesteros CNM

## 2025-04-14 NOTE — PROVIDER NOTIFICATION
04/13/25 0950   Provider Notification   Provider Name/Title ROSA MARIA Sanchez CNM   Method of Notification At Bedside   Notification Reason Status Update       Discussed patient's 1100 CBC order, provider requests order to be discontinued for 4/13 at 1100 and reorder for 4/14 at 0600.

## 2025-04-21 ENCOUNTER — ALLIED HEALTH/NURSE VISIT (OUTPATIENT)
Dept: OBGYN | Facility: CLINIC | Age: 40
End: 2025-04-21
Payer: COMMERCIAL

## 2025-04-21 VITALS — DIASTOLIC BLOOD PRESSURE: 79 MMHG | HEART RATE: 87 BPM | SYSTOLIC BLOOD PRESSURE: 118 MMHG

## 2025-04-21 DIAGNOSIS — O13.9 GESTATIONAL HYPERTENSION, ANTEPARTUM: Primary | ICD-10-CM

## 2025-04-21 PROCEDURE — 3074F SYST BP LT 130 MM HG: CPT

## 2025-04-21 PROCEDURE — 3078F DIAST BP <80 MM HG: CPT

## 2025-04-21 PROCEDURE — 99207 PR NO CHARGE NURSE ONLY: CPT

## 2025-04-21 NOTE — PROGRESS NOTES
BLOOD PRESSURE CHECK    Subjective:    Faith is being seen in clinic for a blood pressure check due to Gestational hypertension . Patient is postpartum (delivery date: 4/12/2025).    Patient reports taking the following antihypertensive medications: nifedipine  30 mg daily    Patient denies no hypertension symptoms.  Stared Nifedipine Friday  Home BPs  4/18 139/86  4/19 127/88  4/20 116/53  4/21 119/83    Objective:    Patient Vitals for the past 24 hrs:   BP Pulse   04/21/25 1010 118/79 87        Plan:    Blood pressure results are in the NORMAL (-139 and DBP 60-89) range of the ACOG Hypertensive Disorders of Pregnancy thresholds for pregnant and recently pregnant patients.    Reviewed with patient to call triage number if symptoms (chest pain, shortness of breath, visual changes, severe headache, lower extremity edema or right upper quadrant pain) occur. Visit encounter routed to provider.  Faith prefers a MyChart message with recommendations.     Teetee Madrigal RN on 4/21/2025 at 10:13 AM

## 2025-04-23 ENCOUNTER — MYC MEDICAL ADVICE (OUTPATIENT)
Dept: MIDWIFE SERVICES | Facility: CLINIC | Age: 40
End: 2025-04-23

## 2025-04-23 NOTE — TELEPHONE ENCOUNTER
Gestational hypertension . Patient is postpartum (delivery date: 4/12/2025).     Patient reports taking the following antihypertensive medications: nifedipine  30 mg daily    BPs included in mychart    Routing pt mychart message to provider to advise.  Joe Forman RN on 4/23/2025 at 11:53 AM   [facility]

## 2025-04-23 NOTE — TELEPHONE ENCOUNTER
BPs reviewed, recommend patient take 30 mg nifedepine every other day for 1 week and then stop. If BPs increase above 140/90 or higher recommend she call us to restart medication    LUCIA Hampton, COLLEENM

## 2025-04-27 ASSESSMENT — EDINBURGH POSTNATAL DEPRESSION SCALE (EPDS)
I HAVE FELT SAD OR MISERABLE: NOT VERY OFTEN
I HAVE LOOKED FORWARD WITH ENJOYMENT TO THINGS: AS MUCH AS I EVER DID
I HAVE BEEN ANXIOUS OR WORRIED FOR NO GOOD REASON: HARDLY EVER
I HAVE BEEN SO UNHAPPY THAT I HAVE BEEN CRYING: ONLY OCCASIONALLY
I HAVE BEEN SO UNHAPPY THAT I HAVE HAD DIFFICULTY SLEEPING: NOT VERY OFTEN
I HAVE BEEN ABLE TO LAUGH AND SEE THE FUNNY SIDE OF THINGS: AS MUCH AS I ALWAYS COULD
THE THOUGHT OF HARMING MYSELF HAS OCCURRED TO ME: NEVER
THINGS HAVE BEEN GETTING ON TOP OF ME: NO, MOST OF THE TIME I HAVE COPED QUITE WELL
I HAVE BLAMED MYSELF UNNECESSARILY WHEN THINGS WENT WRONG: NOT VERY OFTEN
TOTAL SCORE: 7
I HAVE FELT SCARED OR PANICKY FOR NO GOOD REASON: NO, NOT MUCH

## 2025-04-28 ENCOUNTER — VIRTUAL VISIT (OUTPATIENT)
Dept: MIDWIFE SERVICES | Facility: CLINIC | Age: 40
End: 2025-04-28
Payer: COMMERCIAL

## 2025-04-28 PROCEDURE — 99207 PR NO BILLABLE SERVICE THIS VISIT: CPT | Performed by: ADVANCED PRACTICE MIDWIFE

## 2025-04-28 PROCEDURE — 0503F POSTPARTUM CARE VISIT: CPT | Performed by: ADVANCED PRACTICE MIDWIFE

## 2025-04-28 ASSESSMENT — ANXIETY QUESTIONNAIRES
6. BECOMING EASILY ANNOYED OR IRRITABLE: SEVERAL DAYS
2. NOT BEING ABLE TO STOP OR CONTROL WORRYING: SEVERAL DAYS
GAD7 TOTAL SCORE: 6
7. FEELING AFRAID AS IF SOMETHING AWFUL MIGHT HAPPEN: SEVERAL DAYS
5. BEING SO RESTLESS THAT IT IS HARD TO SIT STILL: NOT AT ALL
1. FEELING NERVOUS, ANXIOUS, OR ON EDGE: SEVERAL DAYS
3. WORRYING TOO MUCH ABOUT DIFFERENT THINGS: SEVERAL DAYS
GAD7 TOTAL SCORE: 6

## 2025-04-28 ASSESSMENT — PATIENT HEALTH QUESTIONNAIRE - PHQ9: 5. POOR APPETITE OR OVEREATING: SEVERAL DAYS

## 2025-04-28 NOTE — PATIENT INSTRUCTIONS
"\"I hope you had a positive experience and that you can definitely recommend [facility] to your family and friends. You ll be receiving a survey soon and I would look forward to hearing your feedback\".  "

## 2025-04-28 NOTE — PROGRESS NOTES
Midwife Postpartum 2 Week Visit    Faith KAREN Crow is a 39 year old      This visit took place over the phone [phone]    Delivery date was 25. She had a Vaginal, Spontaneous delivery of a viable boy, 39w3d named Joanna Crow , weight 7 pounds 13.6 oz., with complications of   .    Information for the patient's :  Joanna Crow [[id number]]   7 lbs 13.57 oz   APGARs 8 , 9     Since delivery, she has been breastfeeding, pumping and used formula once. Having to triple feed baby. Having extra peds visit for Joanna. Joanna has not been gaining weight as he should. She has also been working with [facility].  She has not had any signs of infection, she describes her lochia after 2 weeks as spotting.  She has not had other complications.     Had been taking 30 mg Nifedipine once a day. Last few times she has taken her BP at home she has gotten 100-110/70s. Skipped her Nifedipine dose when that occurred. Would like to stop taking her BP medication.    She is voiding and having bowel movements without difficulty.  Average      Contraception was discussed and patient desires condoms.   She has not had intercourse since delivery. She complains of no perineal discomfort.     Mood is Stable  Little overwhelming and crazy at the same time, but getting better  Sleep: Baby's sleep is getting a little bit better   is very helpful  [place] Depression Scale  Thoughts of Harming Self: Never  Total Score: 4       2024     5:14 PM 2025     1:16 PM   SERGIO-7 SCORE   Total Score 2 (minimal anxiety)    Total Score 2 6       ROS:  12 point review of systems negative other than symptoms noted below or in the HPI.       Current Outpatient Medications:     acetaminophen (TYLENOL) 325 MG tablet, Take 2 tablets (650 mg) by mouth every 4 hours as needed for fever or other (second line or per patient preference for mild to moderate pain management)., Disp: , Rfl:     docusate sodium (COLACE) 100 MG capsule, Take 1  capsule (100 mg) by mouth 2 times daily., Disp: 15 capsule, Rfl: 0    ibuprofen (ADVIL/MOTRIN) 800 MG tablet, Take 1 tablet (800 mg) by mouth every 6 hours as needed for other (first line or per patient preference for mild to moderate pain management)., Disp: , Rfl:     levothyroxine (SYNTHROID/LEVOTHROID) 175 MCG tablet, Take 175 mcg by mouth daily., Disp: , Rfl:     NIFEdipine ER OSMOTIC (PROCARDIA XL) 30 MG 24 hr tablet, Take 1 tablet (30 mg) by mouth daily., Disp: 45 tablet, Rfl: 0    Prenatal-FeFum-FA-DHA w/o A (PRENATAL + DHA PO), Take 1 tablet by mouth daily. [facility] Prenatal, Disp: , Rfl: .   OB History    Para Term  AB Living   1 1 1 0 0 1   SAB IAB Ectopic Multiple Live Births   0 0 0 0 1      # Outcome Date GA Lbr Alejo/2nd Weight Sex Type Anes PTL Lv   1 Term 25 39w3d 04:20 / 02:35 3.56 kg (7 lb 13.6 oz) M Vag-Spont EPI N MIKAYLA      Complications: Prolonged rupture of membranes      Name: Joanna Crow      Apgar1: 8  Apgar5: 9     Last pap:    Lab Results   Component Value Date    PAP NIL 01/15/2021     Hgb in hospital was 11.9    EXAM:  LMP 07/10/2024   BMI: There is no height or weight on file to calculate BMI.    ASSESSMENT:   Normal postpartum exam after  Vaginal, Spontaneous.    ICD-10-CM    1. Routine postpartum follow-up  Z39.2       2. Lactating mother  Z39.1           PLAN:  No results found for any visits on 25.      Discontinue BP medications. Continue checking BP 1-2 times a day. Advised to call if BP creeps back up to 130-140s/80-90s. May need to restart medication. Also advised to call with  pre-e symptoms (headaches, vision changes, RUQ pain)  Family Planning:condoms  Continue a multivitamin/prenatal supplement, especially if breastfeeding.  Postpartum Hgb was not done today.  Pap due at 6 wks PP    Return at 6 weeks postpartum for physical and pelvic exam.    Stephenie MYERS CNM

## 2025-05-10 ENCOUNTER — NURSE TRIAGE (OUTPATIENT)
Dept: NURSING | Facility: CLINIC | Age: 40
End: 2025-05-10
Payer: COMMERCIAL

## 2025-05-10 DIAGNOSIS — N93.9 ABNORMAL UTERINE BLEEDING (AUB): Primary | ICD-10-CM

## 2025-05-10 NOTE — TELEPHONE ENCOUNTER
Nurse Triage SBAR    Is this a 2nd Level Triage? YES, LICENSED PRACTITIONER REVIEW IS REQUIRED    Situation:   Post partum vaginal bleeding    Background:   Pt is 1mo post partum and started bleeding about 2-3 days ago  This morning, she woke up and soaked a panty liner.  Bright red blood with pea size clots.    Assessment:   She has some mild lower abdominal cramping.  On and off dizziness/feeling faint the last week but is able to stand up and walk on her own.  States that feeling has improved.    Protocol Recommended Disposition:   See PCP Within 24 Hours    Recommendation:   Paging on call midwife     Paged to provider    Does the patient meet one of the following criteria for ADS visit consideration? 16+ years old, with an [facility] PCP     TIP  Providers, please consider if this condition is appropriate for management at one of our Acute and Diagnostic Services sites.     If patient is a good candidate, please use dotphrase <dot>triageresponse and select Refer to ADS to document.    Non-Primary Care Provider (Specialties/Contract Clinics)    Provider consult indicated.     Reason for page: post partum vaginal bleeding    Page sent to Sonya Gotti CNM by RN at 0722.     Non-Primary Care Provider (Specialties/Contract Clinics)      Provider, Timothy Gotti CNM, returning page to Nurse Advisors at 0723    Provider recommended plan of care:   Okay to monitor for now and schedule an appt for US on Monday.  Monitor for vomiting, excessive pain, heavier bleeding (a pad an hour), fever, and increase in size of clots.    Provider Recommendation Follow Up:   Reached patient/caregiver. Informed of provider's recommendations. Patient verbalized understanding and agrees with the plan.           Yelena Griffin RN, BSN Nurse Triage Advisor 5/10/2025 7:38 AM           Reason for Disposition   [1] Passing blood clots AND [2] persists > 4 days postpartum    Additional Information   Negative: Shock suspected (e.g.,  cold/pale/clammy skin, too weak to stand, low BP, rapid pulse)   Negative: Difficult to awaken or acting confused (e.g., disoriented, slurred speech)   Negative: Passed out (i.e., lost consciousness, collapsed and was not responding)   Negative: Sounds like a life-threatening emergency to the triager   Negative: SEVERE dizziness (e.g., unable to stand, requires support to walk, feels like passing out now)   Negative: [1] SEVERE abdominal pain AND [2] present > 1 hour   Negative: Fever 100.4 F (38.0 C) or higher   Negative: SEVERE vaginal bleeding (e.g., soaking 2 pads or tampons per hour and present 2 or more hours; 1 menstrual cup every 2 hours)   Negative: Patient sounds very sick or weak to the triager   Negative: MODERATE vaginal bleeding (e.g., soaking 1 pad or tampon per hour and present > 6 hours; 1 menstrual cup every 6 hours)   Negative: [1] Constant abdominal pain AND [2] present > 2 hours   Negative: Pale skin (pallor) of new-onset or worsening   Negative: Foul smelling vaginal discharge (i.e., lochia)   Negative: Passed tissue (e.g., gray-white, reddish-purple)   Negative: Bleeding with > 6 soaked pads per day    Protocols used: Postpartum - Vaginal Bleeding and Lochia-A-AH

## 2025-05-13 ENCOUNTER — RESULTS FOLLOW-UP (OUTPATIENT)
Dept: MIDWIFE SERVICES | Facility: CLINIC | Age: 40
End: 2025-05-13

## 2025-05-13 ENCOUNTER — ANCILLARY PROCEDURE (OUTPATIENT)
Dept: ULTRASOUND IMAGING | Facility: CLINIC | Age: 40
End: 2025-05-13
Attending: ADVANCED PRACTICE MIDWIFE
Payer: COMMERCIAL

## 2025-05-13 DIAGNOSIS — N93.9 ABNORMAL UTERINE BLEEDING (AUB): ICD-10-CM

## 2025-05-13 PROCEDURE — 76830 TRANSVAGINAL US NON-OB: CPT | Performed by: OBSTETRICS & GYNECOLOGY

## 2025-05-13 NOTE — PROGRESS NOTES
SUBJECTIVE:                                                   Faith Crow is a 39 year old who presents to clinic today for the following health issue(s):  No chief complaint on file.      HPI:  ***    Patient's last menstrual period was 07/10/2024.  Menstrual History: {MENSES CHARACTERISTS:494893}  Patient {IS NOT/IS:364680} sexually active  .  Using {PLC CONTRACEPTION:099963} for contraception.   Health maintenance updated:  {yes no:858003}  STI infx testing offered:  {ACCEPTED/DECLINED:164405}    Last PHQ-9 score on record =       2024     5:17 PM   PHQ-9 SCORE   PHQ-9 Total Score MyChart 1 (Minimal depression)   PHQ-9 Total Score 1     Last GAD7 score on record =       2025     1:16 PM   SERGIO-7 SCORE   Total Score 6     Alcohol Score = ***    Problem list and histories reviewed & adjusted, as indicated.  Additional history: as documented.    Patient Active Problem List   Diagnosis    Hypothyroidism, unspecified type    Supervision of high-risk pregnancy    Primigravida of advanced maternal age in third trimester    BMI 30.0-30.9,adult    Rubella non-immune status, antepartum    Supervision of high risk pregnancy in second trimester    Low-lying placenta in second trimester    Primigravida of advanced maternal age in second trimester    Supervision of high risk pregnancy in third trimester    Encounter for induction of labor    Prolonged rupture of membranes     (normal spontaneous vaginal delivery)    Lactating mother    Gestational hypertension, antepartum    Gestational hypertension     Past Surgical History:   Procedure Laterality Date    NO HISTORY OF SURGERY        Social History     Tobacco Use    Smoking status: Never    Smokeless tobacco: Never   Substance Use Topics    Alcohol use: Not Currently      Problem (# of Occurrences) Relation (Name,Age of Onset)    Cancer (1) Maternal Grandmother: lung cancer    Cardiovascular (1) Father (46):  CAD age 46 with MI    Hypertension (2)  "Father, Mother    Cerebrovascular Disease (1) Paternal Grandmother    Thyroid Disease (1) Mother              Current Outpatient Medications   Medication Sig Dispense Refill    acetaminophen (TYLENOL) 325 MG tablet Take 2 tablets (650 mg) by mouth every 4 hours as needed for fever or other (second line or per patient preference for mild to moderate pain management).      docusate sodium (COLACE) 100 MG capsule Take 1 capsule (100 mg) by mouth 2 times daily. 15 capsule 0    ibuprofen (ADVIL/MOTRIN) 800 MG tablet Take 1 tablet (800 mg) by mouth every 6 hours as needed for other (first line or per patient preference for mild to moderate pain management).      levothyroxine (SYNTHROID/LEVOTHROID) 175 MCG tablet Take 175 mcg by mouth daily.      NIFEdipine ER OSMOTIC (PROCARDIA XL) 30 MG 24 hr tablet Take 1 tablet (30 mg) by mouth daily. 45 tablet 0    Prenatal-FeFum-FA-DHA w/o A (PRENATAL + DHA PO) Take 1 tablet by mouth daily. [facility] Prenatal       No current facility-administered medications for this visit.     No Known Allergies    ROS:  {ROSGYN:796724::\"12 point review of systems negative other than symptoms noted below.\"}    OBJECTIVE:     LMP 07/10/2024   There is no height or weight on file to calculate BMI.    PHYSICAL EXAM:  {[facility] EXAM CHOICES:093827}     In-Clinic Test Results:  No results found for this or any previous visit (from the past 24 hours).    ASSESSMENT/PLAN:                                                      No diagnosis found.    COUNSELING:      {2021 E&M time (Optional):644113}    ***    "

## 2025-05-14 ENCOUNTER — VIRTUAL VISIT (OUTPATIENT)
Dept: MIDWIFE SERVICES | Facility: CLINIC | Age: 40
End: 2025-05-14
Attending: ADVANCED PRACTICE MIDWIFE
Payer: COMMERCIAL

## 2025-05-14 DIAGNOSIS — R42 DIZZINESS: Primary | ICD-10-CM

## 2025-05-14 PROCEDURE — 99207 PR NO BILLABLE SERVICE THIS VISIT: CPT | Performed by: ADVANCED PRACTICE MIDWIFE

## 2025-05-14 NOTE — RESULT ENCOUNTER NOTE
Results discussed directly with patient while patient was present. Any further details documented in the note.   LUCIA Fitzgerald CNM

## 2025-05-14 NOTE — PROGRESS NOTES
Faith Crow is a 39 year old female who is being evaluated via a patient initiated billable telephone visit.     What phone number would you like to be contacted at? [phone]   How would you like to obtain your AVS? Daniellahart      SUBJECTIVE:                                                   Faith Crow is a 39 year old female who presents for virtual visit today for the following health issue(s):  Patient presents with:  Follow Up: US follow up       Virtual Visit Details    Type of service:  Telephone Visit   Phone call duration: 14 minutes   Originating Location (pt. Location): Home    Distant Location (provider location):  On-site  Telephone visit completed due to the patient did not consent to a video visit.    HPI:  Jacqui is doing much better the last 24 hours, she feels better and bleeding has really slowed down. Wondering if related to going on two different 2 mile long walks last week.   Relieved to hear US looks normal. Would be concerned if she wasn't feeling better or bleeding had not improved. Biggest struggle now feels like feeding.     Patient's last menstrual period was 07/10/2024..     Health maintenance updated:  no    Today's PHQ-2 Score:       2/18/2025    11:51 AM   PHQ-2 ( 1999 [employer])   Q1: Little interest or pleasure in doing things 0   Q2: Feeling down, depressed or hopeless 0   PHQ-2 Score 0    Q1: Little interest or pleasure in doing things Not at all   Q2: Feeling down, depressed or hopeless Not at all   PHQ-2 Score 0       Patient-reported     Today's PHQ-9 Score:       9/11/2024     5:17 PM   PHQ-9 SCORE   PHQ-9 Total Score Shelli 1 (Minimal depression)   PHQ-9 Total Score 1     Today's SERGIO-7 Score:       4/28/2025     1:16 PM   SERGIO-7 SCORE   Total Score 6       Problem list and histories reviewed & adjusted, as indicated.  Additional history: as documented.    Patient Active Problem List   Diagnosis    Hypothyroidism, unspecified type    Supervision of high-risk pregnancy     Primigravida of advanced maternal age in third trimester    BMI 30.0-30.9,adult    Rubella non-immune status, antepartum    Supervision of high risk pregnancy in second trimester    Low-lying placenta in second trimester    Primigravida of advanced maternal age in second trimester    Supervision of high risk pregnancy in third trimester    Encounter for induction of labor    Prolonged rupture of membranes     (normal spontaneous vaginal delivery)    Lactating mother    Gestational hypertension, antepartum    Gestational hypertension     Past Surgical History:   Procedure Laterality Date    NO HISTORY OF SURGERY        Social History     Tobacco Use    Smoking status: Never    Smokeless tobacco: Never   Substance Use Topics    Alcohol use: Not Currently      Problem (# of Occurrences) Relation (Name,Age of Onset)    Cancer (1) Maternal Grandmother: lung cancer    Cardiovascular (1) Father (46):  CAD age 46 with MI    Hypertension (2) Father, Mother    Cerebrovascular Disease (1) Paternal Grandmother    Thyroid Disease (1) Mother              Current Outpatient Medications   Medication Sig Dispense Refill    ibuprofen (ADVIL/MOTRIN) 800 MG tablet Take 1 tablet (800 mg) by mouth every 6 hours as needed for other (first line or per patient preference for mild to moderate pain management).      levothyroxine (SYNTHROID/LEVOTHROID) 175 MCG tablet Take 175 mcg by mouth daily.      Prenatal-FeFum-FA-DHA w/o A (PRENATAL + DHA PO) Take 1 tablet by mouth daily. [employer] Prenatal       No current facility-administered medications for this visit.     No Known Allergies      OBJECTIVE:     No vitals were obtained today due to virtual telephone visit.        ASSESSMENT/PLAN:                                                      Phone call duration: 14 minutes      ICD-10-CM    1. Dizziness  R42       2. Abnormal uterine bleeding, postpartum  O72.1             Discussed postpartum bleeding can last up to 6 + weeks, true PP  period is 12 weeks  Bleeding picking back up can be a sign of too much activity, recommend listening to body, no formal exercise until 6 weeks  Reviewed US results and release to [facility], no evidence of RPOC, can consider repeating if persistent bleeding or return of other symptoms  Discussed follicles present, small possibility this is consistent with first cycle  Recommend continued monitoring for symptoms and to call if she needs to be seen sooner than 6 week visit   Abstain from intercourse until 6 weeks  Discussed fed is best motto  Return to clinic for routine postpartum visit  on 5/29/25, can considered repeat imaging and/or labs as needed    Total visit time: 20 minutes       LUCIA Fitzgerald CNM  [facility] [place]

## 2025-05-27 NOTE — PROGRESS NOTES
Midwife Postpartum 6 Week Visit    Faith Crow is a 39 year old here for a postpartum checkup.     Delivery date was 25. She had a  of a viable boy, named Joanna, weight 7 pounds 13.6 oz., with prolonged rupture of membranes.      Since delivery, she has been breast feeding.  She has not had any signs of infection, her lochia stopped after 2 weeks. Bleeding did start again at about 4wks PP and lasted for 5 days. She did admit to an increase in walking/activity. She did have an TVUS  that was normal.    She is voiding and having bowel movements without difficulty.      Contraception was discussed and patient desires condoms.   She  has had intercourse since delivery.  She complains of no  perineal discomfort. She is excited to be cleared to workout. Requested a referral for pelvic floor PT.    Mood is Stable  Patient screened for postpartum depression.   Depression Rating was:     [place] Depression Scale  Thoughts of Harming Self: (Patient-Rptd) Never  Total Score: (Patient-Rptd) 4     Last PHQ-9 score on record =       2025    10:36 AM   PHQ-9 SCORE   PHQ-9 Total Score 2     Last GAD7 score on record =       2025    10:36 AM   SERGIO-7 SCORE   Total Score 1     Alcohol Score = 2    ROS:  12 point review of systems negative other than symptoms noted below or in the HPI.       Current Outpatient Medications:     ibuprofen (ADVIL/MOTRIN) 800 MG tablet, Take 1 tablet (800 mg) by mouth every 6 hours as needed for other (first line or per patient preference for mild to moderate pain management)., Disp: , Rfl:     levothyroxine (SYNTHROID/LEVOTHROID) 175 MCG tablet, Take 175 mcg by mouth daily. (Patient taking differently: Take 150 mcg by mouth daily.), Disp: , Rfl:     Prenatal-FeFum-FA-DHA w/o A (PRENATAL + DHA PO), Take 1 tablet by mouth daily. [employer] Prenatal, Disp: , Rfl:     vitamin B-12 (CYANOCOBALAMIN) 2500 MCG sublingual tablet, Take 2,500 mcg by mouth daily., Disp: , Rfl: .   OB  "History    Para Term  AB Living   1 1 1 0 0 1   SAB IAB Ectopic Multiple Live Births   0 0 0 0 1      # Outcome Date GA Lbr Alejo/2nd Weight Sex Type Anes PTL Lv   1 Term 25 39w3d 04:20 / 02:35 3.56 kg (7 lb 13.6 oz) M Vag-Spont EPI N MIKAYLA      Complications: Prolonged rupture of membranes      Name: Joanna Crow      Apgar1: 8  Apgar5: 9     Last pap:   Lab Results   Component Value Date    PAP NIL 01/15/2021    PAP NIL 10/21/2016    PAP NIL 2013   )  Hgb in hospital was N/A    EXAM:  BP 92/62   Ht 1.575 m (5' 2\")   Wt 71.2 kg (157 lb)   LMP 07/10/2024   Breastfeeding Yes   BMI 28.72 kg/m    BMI: Body mass index is 28.72 kg/m .  Constitutional: healthy, alert and no distress  Neck: symmetrical, thyroid normal size, no masses present, no lymphadenopathy present.   Breast:normal without masses, tenderness or nipple discharge and no palpable axillary masses or adenopathy, patient lactating.  Abdomen: soft, non-tender, diastasis 2 FB's    PELVIC EXAM:  Vulva: No lesions, intact, no tenderness  Vagina: Moist, pink, discharge normal  well rugated, no lesions  Cervix:smooth, pink, no visible lesions  Uterus: Involuted to normal size, non-tender, no masses palpated  Ovaries: No masses palpated  Pelvic tone: poor, unable to connect with muscles.   Rectal exam: deferred      ASSESSMENT:   Normal postpartum exam after .    ICD-10-CM    1. Postpartum exam  Z39.2       2. Lactating mother  Z39.1       3. Diastasis recti  M62.08 Physical Therapy  Referral      4. Screening for cervical cancer  Z12.4 HPV and Gynecologic Cytology Panel - Recommended Age 30-65 Years      5. Pelvic floor weakness in female  N81.89 Physical Therapy  Referral      6. Rubella non-immune status, antepartum  O09.899     Z28.39             PLAN:  No results found for any visits on 25.    Return as needed or at time of next expected pap, pelvic, or breast exam.  Teaching: exercise, birth control, " mental health, and weight/diet  Family Planning: condoms  Encourage Kegels and abdominal exercise.   Pelvic floor PT referral placed due to poor pelvic tone and abdominal separation.  Endocrine follows TSH, has an appointment in 2 wks.  Continue a multivitamin/prenatal supplement, especially if breastfeeding.  Encouraged hydration and electrolytes while breastfeeding for low BPs. If she becomes symptomatic encouraged to reach out.   Pap smear was obtained today.  Postpartum Hgb was not done today.  Planning to use [facility] as primary care.  Cleared for exercise and intercourse.  Pelvic floor physical therapy referral placed today  Pap per AS[facility]P guidelines    Return to clinic:  In 1 year for annual or sooner as needed.    Masha Epps JOSE ANTONIO student    I was present with the student who participated in the service and in the documentation of the note. I have verified the history and personally performed the physical exam and medical decision-making. I agree with the assessment and plan of care as documented in the note.     Flower MYERS, ALANA, JOSE ANTONIO-BC  [phone]

## 2025-05-28 ASSESSMENT — EDINBURGH POSTNATAL DEPRESSION SCALE (EPDS)
I HAVE BEEN SO UNHAPPY THAT I HAVE HAD DIFFICULTY SLEEPING: NOT AT ALL
I HAVE LOOKED FORWARD WITH ENJOYMENT TO THINGS: AS MUCH AS I EVER DID
I HAVE BLAMED MYSELF UNNECESSARILY WHEN THINGS WENT WRONG: NOT VERY OFTEN
TOTAL SCORE: 4
I HAVE BEEN ABLE TO LAUGH AND SEE THE FUNNY SIDE OF THINGS: AS MUCH AS I ALWAYS COULD
I HAVE FELT SCARED OR PANICKY FOR NO GOOD REASON: NO, NOT MUCH
I HAVE BEEN ANXIOUS OR WORRIED FOR NO GOOD REASON: HARDLY EVER
I HAVE BEEN SO UNHAPPY THAT I HAVE BEEN CRYING: NO, NEVER
I HAVE FELT SAD OR MISERABLE: NO, NOT AT ALL
THE THOUGHT OF HARMING MYSELF HAS OCCURRED TO ME: NEVER
THINGS HAVE BEEN GETTING ON TOP OF ME: NO, MOST OF THE TIME I HAVE COPED QUITE WELL

## 2025-05-29 ENCOUNTER — PRENATAL OFFICE VISIT (OUTPATIENT)
Dept: MIDWIFE SERVICES | Facility: CLINIC | Age: 40
End: 2025-05-29
Payer: COMMERCIAL

## 2025-05-29 VITALS
BODY MASS INDEX: 28.89 KG/M2 | DIASTOLIC BLOOD PRESSURE: 62 MMHG | SYSTOLIC BLOOD PRESSURE: 92 MMHG | HEIGHT: 62 IN | WEIGHT: 157 LBS

## 2025-05-29 DIAGNOSIS — Z28.39 RUBELLA NON-IMMUNE STATUS, ANTEPARTUM: ICD-10-CM

## 2025-05-29 DIAGNOSIS — O09.899 RUBELLA NON-IMMUNE STATUS, ANTEPARTUM: ICD-10-CM

## 2025-05-29 DIAGNOSIS — N81.89 PELVIC FLOOR WEAKNESS IN FEMALE: ICD-10-CM

## 2025-05-29 DIAGNOSIS — Z12.4 SCREENING FOR CERVICAL CANCER: ICD-10-CM

## 2025-05-29 DIAGNOSIS — M62.08 DIASTASIS RECTI: ICD-10-CM

## 2025-05-29 PROBLEM — O09.513 PRIMIGRAVIDA OF ADVANCED MATERNAL AGE IN THIRD TRIMESTER: Status: RESOLVED | Noted: 2024-09-12 | Resolved: 2025-05-29

## 2025-05-29 PROBLEM — O42.90 PROLONGED RUPTURE OF MEMBRANES: Status: RESOLVED | Noted: 2025-04-12 | Resolved: 2025-05-29

## 2025-05-29 PROBLEM — Z34.90 ENCOUNTER FOR INDUCTION OF LABOR: Status: RESOLVED | Noted: 2025-04-11 | Resolved: 2025-05-29

## 2025-05-29 PROBLEM — O44.42 LOW-LYING PLACENTA IN SECOND TRIMESTER: Status: RESOLVED | Noted: 2025-04-05 | Resolved: 2025-05-29

## 2025-05-29 PROBLEM — O09.93 SUPERVISION OF HIGH RISK PREGNANCY IN THIRD TRIMESTER: Status: RESOLVED | Noted: 2025-04-11 | Resolved: 2025-05-29

## 2025-05-29 PROBLEM — O13.9 GESTATIONAL HYPERTENSION, ANTEPARTUM: Status: RESOLVED | Noted: 2025-04-14 | Resolved: 2025-05-29

## 2025-05-29 PROBLEM — O09.512 PRIMIGRAVIDA OF ADVANCED MATERNAL AGE IN SECOND TRIMESTER: Status: RESOLVED | Noted: 2025-04-05 | Resolved: 2025-05-29

## 2025-05-29 PROBLEM — O09.90 SUPERVISION OF HIGH-RISK PREGNANCY: Status: RESOLVED | Noted: 2024-09-12 | Resolved: 2025-05-29

## 2025-05-29 PROBLEM — O13.9 GESTATIONAL HYPERTENSION: Status: RESOLVED | Noted: 2025-04-14 | Resolved: 2025-05-29

## 2025-05-29 PROBLEM — O09.92 SUPERVISION OF HIGH RISK PREGNANCY IN SECOND TRIMESTER: Status: RESOLVED | Noted: 2025-04-05 | Resolved: 2025-05-29

## 2025-05-29 RX ORDER — CYANOCOBALAMIN (VITAMIN B-12) 2500 MCG
2500 TABLET, SUBLINGUAL SUBLINGUAL DAILY
COMMUNITY

## 2025-05-29 ASSESSMENT — ANXIETY QUESTIONNAIRES
1. FEELING NERVOUS, ANXIOUS, OR ON EDGE: SEVERAL DAYS
5. BEING SO RESTLESS THAT IT IS HARD TO SIT STILL: NOT AT ALL
2. NOT BEING ABLE TO STOP OR CONTROL WORRYING: NOT AT ALL
GAD7 TOTAL SCORE: 1
IF YOU CHECKED OFF ANY PROBLEMS ON THIS QUESTIONNAIRE, HOW DIFFICULT HAVE THESE PROBLEMS MADE IT FOR YOU TO DO YOUR WORK, TAKE CARE OF THINGS AT HOME, OR GET ALONG WITH OTHER PEOPLE: NOT DIFFICULT AT ALL
6. BECOMING EASILY ANNOYED OR IRRITABLE: NOT AT ALL
3. WORRYING TOO MUCH ABOUT DIFFERENT THINGS: NOT AT ALL
7. FEELING AFRAID AS IF SOMETHING AWFUL MIGHT HAPPEN: NOT AT ALL
GAD7 TOTAL SCORE: 1

## 2025-05-29 ASSESSMENT — PATIENT HEALTH QUESTIONNAIRE - PHQ9
SUM OF ALL RESPONSES TO PHQ QUESTIONS 1-9: 2
5. POOR APPETITE OR OVEREATING: NOT AT ALL

## 2025-06-02 ENCOUNTER — RESULTS FOLLOW-UP (OUTPATIENT)
Dept: OBGYN | Facility: CLINIC | Age: 40
End: 2025-06-02

## 2025-06-04 LAB
BKR AP ASSOCIATED HPV REPORT: NORMAL
BKR LAB AP GYN ADEQUACY: NORMAL
BKR LAB AP GYN INTERPRETATION: NORMAL
BKR LAB AP PREVIOUS ABNORMAL: NORMAL
PATH REPORT.COMMENTS IMP SPEC: NORMAL
PATH REPORT.COMMENTS IMP SPEC: NORMAL
PATH REPORT.RELEVANT HX SPEC: NORMAL

## 2025-06-25 ENCOUNTER — MYC MEDICAL ADVICE (OUTPATIENT)
Dept: MIDWIFE SERVICES | Facility: CLINIC | Age: 40
End: 2025-06-25
Payer: COMMERCIAL

## 2025-08-21 ENCOUNTER — PATIENT OUTREACH (OUTPATIENT)
Dept: CARE COORDINATION | Facility: CLINIC | Age: 40
End: 2025-08-21
Payer: COMMERCIAL